# Patient Record
Sex: FEMALE | Race: WHITE | HISPANIC OR LATINO | Employment: FULL TIME | URBAN - METROPOLITAN AREA
[De-identification: names, ages, dates, MRNs, and addresses within clinical notes are randomized per-mention and may not be internally consistent; named-entity substitution may affect disease eponyms.]

---
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
## 2018-01-10 ENCOUNTER — OFFICE VISIT (OUTPATIENT)
Dept: FAMILY MEDICINE | Facility: CLINIC | Age: 18
End: 2018-01-10
Payer: COMMERCIAL

## 2018-01-10 VITALS
BODY MASS INDEX: 43.4 KG/M2 | DIASTOLIC BLOOD PRESSURE: 78 MMHG | HEIGHT: 69 IN | WEIGHT: 293 LBS | SYSTOLIC BLOOD PRESSURE: 128 MMHG | OXYGEN SATURATION: 96 % | HEART RATE: 99 BPM | TEMPERATURE: 98.2 F

## 2018-01-10 DIAGNOSIS — J06.9 UPPER RESPIRATORY TRACT INFECTION, UNSPECIFIED TYPE: Primary | ICD-10-CM

## 2018-01-10 LAB
DEPRECATED S PYO AG THROAT QL EIA: NORMAL
FLUAV+FLUBV AG SPEC QL: NEGATIVE
FLUAV+FLUBV AG SPEC QL: NEGATIVE
SPECIMEN SOURCE: NORMAL
SPECIMEN SOURCE: NORMAL

## 2018-01-10 PROCEDURE — 87880 STREP A ASSAY W/OPTIC: CPT | Performed by: NURSE PRACTITIONER

## 2018-01-10 PROCEDURE — 87804 INFLUENZA ASSAY W/OPTIC: CPT | Performed by: NURSE PRACTITIONER

## 2018-01-10 PROCEDURE — 87081 CULTURE SCREEN ONLY: CPT | Performed by: NURSE PRACTITIONER

## 2018-01-10 PROCEDURE — 99213 OFFICE O/P EST LOW 20 MIN: CPT | Performed by: NURSE PRACTITIONER

## 2018-01-10 NOTE — LETTER
January 10, 2018      Bev Araujo  [address]  [facility] [address]        To Whom It May Concern,        Patient was seen today in our clinic due to illness.  She has been diagnosed with a likely viral upper respiratory illness, negative for influenza or strep pharyngitis.   She is cleared to return to work when she is afebrile and symptom-free for 24 hours.           Sincerely,        JASEN Rao CNP

## 2018-01-10 NOTE — MR AVS SNAPSHOT
"              After Visit Summary   1/10/2018    Bev Araujo    MRN: [id number]           Patient Information     Date Of Birth          2000        Visit Information        Provider Department      1/10/2018 9:00 AM Erica Babb APRN CNP [facility]        Today's Diagnoses     Upper respiratory tract infection, unspecified type    -  1       Follow-ups after your visit        Who to contact     If you have questions or need follow up information about today's clinic visit or your schedule please contact [facility] directly at [phone].  Normal or non-critical lab and imaging results will be communicated to you by Game9zhart, letter or phone within 4 business days after the clinic has received the results. If you do not hear from us within 7 days, please contact the clinic through Game9zhart or phone. If you have a critical or abnormal lab result, we will notify you by phone as soon as possible.  Submit refill requests through [employer] or call your pharmacy and they will forward the refill request to us. Please allow 3 business days for your refill to be completed.          Additional Information About Your Visit        MyChart Information     [employer] lets you send messages to your doctor, view your test results, renew your prescriptions, schedule appointments and more. To sign up, go to [email or web address] . Click on \"Log in\" on the left side of the screen, which will take you to the Welcome page. Then click on \"Sign up Now\" on the right side of the page.     You will be asked to enter the access code listed below, as well as some personal information. Please follow the directions to create your username and password.     Your access code is: [email or web address]  Expires: 2018 10:21 AM     Your access code will  in 90 days. If you need help or a new code, please call your [facility] or [phone].        Care EveryWhere ID     This " "is your Care EveryWhere ID. This could be used by other organizations to access your [facility] medical records  [id number]        Your Vitals Were     Pulse Temperature Height Pulse Oximetry BMI (Body Mass Index)       99 98.2  F (36.8  C) (Oral) 5' 8.5\" (1.74 m) 96% 47.64 kg/m2        Blood Pressure from Last 3 Encounters:   01/10/18 128/78   11/06/17 (!) 158/93   11/06/17 129/85    Weight from Last 3 Encounters:   01/10/18 (!) 318 lb (144.2 kg) (>99 %)*   11/06/17 (!) 323 lb 3.2 oz (146.6 kg) (>99 %)*   11/06/17 (!) 323 lb 9.6 oz (146.8 kg) (>99 %)*     * Growth percentiles are based on [facility] 2-20 Years data.              We Performed the Following     Beta strep group A culture     Influenza A/B antigen     Rapid strep screen        Primary Care Provider Office Phone # Fax #    Linnea Kirby Madisyn, [phone]       [address]  [facility] [address]        Equal Access to Services     [facility] Hospital[facility] : Hadii aad ku hadasho Sojuanitaali, waaxda luqadaha, qaybta kaalmada adeegyada, costa pascal . So [facility] [phone].    ATENCIÓN: Si habla español, tiene a mcfarland disposición servicios gratuitos de asistencia lingüística. [facility] [phone].    We comply with applicable federal civil rights laws and [place] laws. We do not discriminate on the basis of race, color, national origin, age, disability, sex, sexual orientation, or gender identity.            Thank you!     Thank you for choosing [facility]  for your care. Our goal is always to provide you with excellent care. Hearing back from our patients is one way we can continue to improve our services. Please take a few minutes to complete the written survey that you may receive in the mail after your visit with us. Thank you!             Your Updated Medication List - Protect others around you: Learn how to safely use, store and throw away your medicines at www.disposemymeds.org.          This list is " accurate as of: 1/10/18  9:58 AM.  Always use your most recent med list.                   Brand Name Dispense Instructions for use Diagnosis    levonorgestrel-ethinyl estradiol 0.1-20 MG-MCG per tablet    AVIANE,ALESSE,LESSINA    84 tablet    Take 1 tablet by mouth daily    Irregular menstrual cycle       norethindrone 0.35 MG per tablet    MICRONOR    84 tablet    Take 1 tablet (0.35 mg) by mouth daily    General counseling for prescription of oral contraceptives

## 2018-01-10 NOTE — PROGRESS NOTES
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  Pt states she has had nausea and fatigue.    Unable to return to work unless cleared - requests strep and flu testing.      ROS  Negative for constitutional, eye, ear, nose, throat, skin, respiratory, cardiac, and gastrointestinal other than those outlined in the HPI.    PROBLEM LIST  Patient Active Problem List    Diagnosis Date Noted     Morbid obesity (H) 05/14/2016     Priority: Medium     Attention deficit disorder 06/30/2006     Priority: Medium     Overview:   Inattention dominates Concerta 54 mg strength  in 2008   (weekends -  varies as to if meds  given  or not )        MEDICATIONS  Current Outpatient Prescriptions   Medication Sig Dispense Refill     norethindrone (MICRONOR) 0.35 MG per tablet Take 1 tablet (0.35 mg) by mouth daily 84 tablet 3     levonorgestrel-ethinyl estradiol (AVIANE,ALESSE,LESSINA) 0.1-20 MG-MCG per tablet Take 1 tablet by mouth daily (Patient not taking: Reported on 1/10/2018) 84 tablet 3      ALLERGIES  No Known Allergies    Reviewed and updated as needed this visit by clinical staff  Tobacco  Allergies  Meds  Med Hx  Surg Hx  Fam Hx  Soc Hx        Reviewed and updated as needed this visit by Provider       OBJECTIVE:     /78  Pulse 99  Temp 98.2  F (36.8  C) (Oral)  Ht 5' 8.5\" (1.74 m)  Wt (!) 318 lb (144.2 kg)  SpO2 96%  BMI 47.64 kg/m2  95 %ile based on CDC 2-20 Years stature-for-age data using " vitals from 1/10/2018.  >99 %ile based on CDC 2-20 Years weight-for-age data using vitals from 1/10/2018.  >99 %ile based on CDC 2-20 Years BMI-for-age data using vitals from 1/10/2018.  Blood pressure percentiles are 93.5 % systolic and 88.2 % diastolic based on NHBPEP's 4th Report.   (This patient's height is above the 95th percentile. The blood pressure percentiles above assume this patient to be in the 95th percentile.)    GENERAL: Active, alert, in no acute distress.  SKIN: Clear. No significant rash, abnormal pigmentation or lesions  HEAD: Normocephalic.  EYES:  No discharge or erythema. Normal pupils and EOM.  EARS: Normal canals. Tympanic membranes are normal; gray and translucent.  NOSE: Normal without discharge.  MOUTH/THROAT: Clear. No oral lesions.+cobblestoning.  Mild post-nasal discharge.   NECK: Supple, no masses.  LYMPH NODES: No adenopathy  LUNGS: Clear. No rales, rhonchi, wheezing or retractions  HEART: Regular rhythm. Normal S1/S2. No murmurs.    DIAGNOSTICS: Rapid strep Ag:  negative  Influenza Ag:  A negative; B negative    ASSESSMENT/PLAN:   1. Upper respiratory tract infection, unspecified type  Increased fluid hydration  Acetaminophen/ibuprofen as needed for throat and headache pain  Nasal saline as needed for nasal congestion  Humidifier/vaporizer/moist steam suggested.        Recommend flonase and cetirizine once daily PRN for congestion.   Letter provided for work.     - Rapid strep screen  - Influenza A/B antigen  - Beta strep group A culture    FOLLOW UP: Return to clinic as needed for persistent/worsening symptoms, reviewed.       Erica Babb, APRN CNP

## 2018-01-11 LAB
BACTERIA SPEC CULT: NORMAL
SPECIMEN SOURCE: NORMAL

## 2018-02-23 ENCOUNTER — OFFICE VISIT (OUTPATIENT)
Dept: OPTOMETRY | Facility: CLINIC | Age: 18
End: 2018-02-23
Payer: COMMERCIAL

## 2018-02-23 ENCOUNTER — TELEPHONE (OUTPATIENT)
Dept: OPTOMETRY | Facility: CLINIC | Age: 18
End: 2018-02-23

## 2018-02-23 DIAGNOSIS — H52.223 REGULAR ASTIGMATISM OF BOTH EYES: ICD-10-CM

## 2018-02-23 DIAGNOSIS — H52.13 MYOPIA OF BOTH EYES: Primary | ICD-10-CM

## 2018-02-23 PROCEDURE — 92015 DETERMINE REFRACTIVE STATE: CPT | Performed by: OPTOMETRIST

## 2018-02-23 PROCEDURE — 92310 CONTACT LENS FITTING OU: CPT | Mod: GA | Performed by: OPTOMETRIST

## 2018-02-23 PROCEDURE — 92014 COMPRE OPH EXAM EST PT 1/>: CPT | Performed by: OPTOMETRIST

## 2018-02-23 ASSESSMENT — SLIT LAMP EXAM - LIDS
COMMENTS: NORMAL
COMMENTS: NORMAL

## 2018-02-23 ASSESSMENT — VISUAL ACUITY
METHOD: SNELLEN - LINEAR
OS_CC: 2/020
OD_CC: 20/20
CORRECTION_TYPE: CONTACTS
OS_CC+: -1
OD_CC: 20/20
OD_CC+: -1
OS_CC: 20/20

## 2018-02-23 ASSESSMENT — TONOMETRY
IOP_METHOD: TONOPEN
OS_IOP_MMHG: 19
OD_IOP_MMHG: 21

## 2018-02-23 ASSESSMENT — EXTERNAL EXAM - RIGHT EYE: OD_EXAM: NORMAL

## 2018-02-23 ASSESSMENT — CUP TO DISC RATIO
OS_RATIO: 0.3
OD_RATIO: 0.4

## 2018-02-23 ASSESSMENT — REFRACTION_CURRENTRX
OS_SPHERE: -3.75
OS_BASECURVE: 8.60
OD_SPHERE: -3.00
OS_DIAMETER: 14.2
OD_AXIS: 180
OD_BASECURVE: 8.70
OD_CYLINDER: -0.75
OD_BRAND: AIR OPTIX AQUA FOR ASTIGMATISM
OS_BRAND: AIR OPTIX AQUA
OD_DIAMETER: 14.5

## 2018-02-23 ASSESSMENT — EXTERNAL EXAM - LEFT EYE: OS_EXAM: NORMAL

## 2018-02-23 ASSESSMENT — REFRACTION_MANIFEST
OS_AXIS: 087
OS_CYLINDER: +0.75
OS_SPHERE: -4.50
OD_SPHERE: -4.25
OD_AXIS: 092
OD_CYLINDER: +1.25

## 2018-02-23 ASSESSMENT — REFRACTION_WEARINGRX
OD_SPHERE: -4.25
OS_SPHERE: -4.50
OS_AXIS: 089
OD_AXIS: 092
OS_CYLINDER: +0.75
OD_CYLINDER: +1.25

## 2018-02-23 ASSESSMENT — CONF VISUAL FIELD
OD_NORMAL: 1
OS_NORMAL: 1

## 2018-02-23 NOTE — TELEPHONE ENCOUNTER
Brand Base Curve Diameter Sphere Cylinder Axis   Right Air Optix Aqua for Astigmatism 8.70 14.5 -3.00 -1.25 180   Left Air Optix Aqua 8.60 14.2 -3.75 -0.75 180     Order trials of prescription 2 for  at [facility].  Trial contact lenses will be ordered for .  Ok to order preferred lens.   Kelin Levin ORDERED CONTACTS 2/23/2018.

## 2018-02-23 NOTE — MR AVS SNAPSHOT
After Visit Summary   2/23/2018    Bev Araujo    MRN: [id number]           Patient Information     Date Of Birth          2000        Visit Information        Provider Department      2/23/2018 8:40 AM Willie Chang OD [facility]        Today's Diagnoses     Myopia of both eyes    -  1    Regular astigmatism of both eyes          Care Instructions    Eyeglass prescription given.    Trial contact lenses will be ordered for .  Ok to order preferred lens.    Return in 1 year for a complete eye exam or sooner if needed.    Willie Chang OD      The affects of the dilating drops last for 4- 6 hours.  You will be more sensitive to light and vision will be blurry up close.  Mydriatic sunglasses were given if needed.      Optometry Providers       Clinic Locations                                 Telephone Number   Dr. Estephania Whiting   [facility] [phone]     [facility] Hours:                [facility] Hours:       [facility] Hours:   [address]   [address]     [address] [place] [address]   [place] [address]    [place] [address]  Phone: [phone]                    Phone: [phone]     Phone: [phone]                      Monday 8:00-7:00                          Monday 8:00-7:00                          Monday 8:00-7:00              Tuesday 8:00-6:00                          Tuesday 8:00-7:00                          Tuesday 8:00-7:00              Wednesday 8:00-6:00                  Wednesday 8:00-7:00                   Wednesday 8:00-7:00      Thursday 8:00-6:00                        Thursday 8:00-7:00                         Thursday 8:00-7:00            Friday 8:00-5:00                              Friday 8:00-5:00                              Friday  8:00-5:00    [facility] Hours:   [address] [place] [address]  [phone]    Monday 8:00-7:00  Tuesday 8:00-7:00  Wednesday 8:00-7:00  Thursday 8:00-7:00  Friday 8:00-5:00  Please log on to [email or web address] to order your contact lenses.  The link is found on the Eye Care and Vision Services page.  As always, Thank you for trusting us with your health care needs!              Follow-ups after your visit        Follow-up notes from your care team     Return in about 1 year (around 2019) for Annual Visit.      Who to contact     If you have questions or need follow up information about today's clinic visit or your schedule please contact [facility] directly at [phone].  Normal or non-critical lab and imaging results will be communicated to you by MyChart, letter or phone within 4 business days after the clinic has received the results. If you do not hear from us within 7 days, please contact the clinic through MYTRNDhart or phone. If you have a critical or abnormal lab result, we will notify you by phone as soon as possible.  Submit refill requests through [employer] or call your pharmacy and they will forward the refill request to us. Please allow 3 business days for your refill to be completed.          Additional Information About Your Visit        MyChart Information     Played is an electronic gateway that provides easy, online access to your medical records. With Played, you can request a clinic appointment, read your test results, renew a prescription or communicate with your care team.     To sign up for Played visit the website at [email or web address]   You will be asked to enter the access code listed below, as well as some personal information. Please follow the directions to create your username and password.     Your access code is: [email or web address]  Expires: 2018  9:03 AM     Your access code will  in 90 days. If you need help or a new code, please  contact your [facility] or call [phone] for assistance.        Care EveryWhere ID     This is your Care EveryWhere ID. This could be used by other organizations to access your [place] medical records  [id number]         Blood Pressure from Last 3 Encounters:   01/10/18 128/78   11/06/17 (!) 158/93   11/06/17 129/85    Weight from Last 3 Encounters:   01/10/18 (!) 144.2 kg (318 lb) (>99 %)*   11/06/17 (!) 146.6 kg (323 lb 3.2 oz) (>99 %)*   11/06/17 (!) 146.8 kg (323 lb 9.6 oz) (>99 %)*     * Growth percentiles are based on [facility] 2-20 Years data.              We Performed the Following     CONTACT LENS FITTING,BILAT     EYE EXAM (SIMPLE-NONBILLABLE)     REFRACTION          Today's Medication Changes          These changes are accurate as of 2/23/18  9:03 AM.  If you have any questions, ask your nurse or doctor.               Stop taking these medicines if you haven't already. Please contact your care team if you have questions.     levonorgestrel-ethinyl estradiol 0.1-20 MG-MCG per tablet   Commonly known as:  RASHMI FERRER LESSINA   Stopped by:  Willie Chang, STEPHANIA                    Primary Care Provider Office Phone # Fax #    Linnea Mars [phone]       [address]  [facility] [address]        Equal Access to Services     NAHOMY LÓPEZ : Hadii elidia ku hadasho Soomaali, waaxda luqadaha, qaybta kaalmada costa hood . So [facility] [place] [phone].    ATENCIÓN: Si habla español, tiene a mcfarland disposición servicios gratuitos de asistencia lingüística. Llame al [phone].    We comply with applicable federal civil rights laws and [place] laws. We do not discriminate on the basis of race, color, national origin, age, disability, sex, sexual orientation, or gender identity.            Thank you!     Thank you for choosing [facility]  for your care. Our goal is always to provide you with excellent care.  Hearing back from our patients is one way we can continue to improve our services. Please take a few minutes to complete the written survey that you may receive in the mail after your visit with us. Thank you!             Your Updated Medication List - Protect others around you: Learn how to safely use, store and throw away your medicines at www.disposemymeds.org.          This list is accurate as of 2/23/18  9:03 AM.  Always use your most recent med list.                   Brand Name Dispense Instructions for use Diagnosis    norethindrone 0.35 MG per tablet    MICRONOR    84 tablet    Take 1 tablet (0.35 mg) by mouth daily    General counseling for prescription of oral contraceptives

## 2018-02-23 NOTE — LETTER
2/23/2018         RE: Bev Araujo  [address]  [facility] [address]        Dear Colleague,    Thank you for referring your patient, Bev Araujo, to the [facility]. Please see a copy of my visit note below.    Chief Complaint   Patient presents with     COMPREHENSIVE EYE EXAM     more contacts fit fee ok        Previous contact lens wearer? Yes: air oprtix  Comfort of contact lenses :good  Satisfied with current lenses: Yes        Last Eye Exam: 2-  Dilated Previously: Yes    What are you currently using to see?  contacts    Distance Vision Acuity: Noticed gradual change in both eyes    Near Vision Acuity: Satisfied with vision while reading  contacts    Eye Comfort: good  Do you use eye drops? : Yes: blink for contacts  Occupation or Hobbies: nursing asst      Kelin Levin Optometric Assistant, A.B.O.C.     Medical, surgical and family histories reviewed and updated 2/23/2018.       OBJECTIVE: See Ophthalmology exam    ASSESSMENT:    ICD-10-CM    1. Myopia of both eyes H52.13 EYE EXAM (SIMPLE-NONBILLABLE)     CONTACT LENS FITTING,BILAT     REFRACTION   2. Regular astigmatism of both eyes H52.223 EYE EXAM (SIMPLE-NONBILLABLE)     CONTACT LENS FITTING,BILAT     REFRACTION      PLAN:     Patient Instructions   Eyeglass prescription given.    Trial contact lenses will be ordered for .  Ok to order preferred lens.    Return in 1 year for a complete eye exam or sooner if needed.    Willie Chang, STEPHANIA                           Again, thank you for allowing me to participate in the care of your patient.        Sincerely,        Willie Chang, OD

## 2018-02-23 NOTE — PROGRESS NOTES
Chief Complaint   Patient presents with     COMPREHENSIVE EYE EXAM     more contacts fit fee ok        Previous contact lens wearer? Yes: air oprtix  Comfort of contact lenses :good  Satisfied with current lenses: Yes        Last Eye Exam: 2-  Dilated Previously: Yes    What are you currently using to see?  contacts    Distance Vision Acuity: Noticed gradual change in both eyes    Near Vision Acuity: Satisfied with vision while reading  contacts    Eye Comfort: good  Do you use eye drops? : Yes: blink for contacts  Occupation or Hobbies: nursing asst      Kelin Levin Optometric Assistant, A.B.O.C.     Medical, surgical and family histories reviewed and updated 2/23/2018.       OBJECTIVE: See Ophthalmology exam    ASSESSMENT:    ICD-10-CM    1. Myopia of both eyes H52.13 EYE EXAM (SIMPLE-NONBILLABLE)     CONTACT LENS FITTING,BILAT     REFRACTION   2. Regular astigmatism of both eyes H52.223 EYE EXAM (SIMPLE-NONBILLABLE)     CONTACT LENS FITTING,BILAT     REFRACTION      PLAN:     Patient Instructions   Eyeglass prescription given.    Trial contact lenses will be ordered for .  Ok to order preferred lens.    Return in 1 year for a complete eye exam or sooner if needed.    Willie Chang, OD

## 2018-02-23 NOTE — PATIENT INSTRUCTIONS
Eyeglass prescription given.    Trial contact lenses will be ordered for .  Ok to order preferred lens.    Return in 1 year for a complete eye exam or sooner if needed.    Willie Chang, STEPHANIA      The affects of the dilating drops last for 4- 6 hours.  You will be more sensitive to light and vision will be blurry up close.  Mydriatic sunglasses were given if needed.      Optometry Providers       Clinic Locations                                 Telephone Number   Dr. Estephania Fuentes [facility]   [facility] [phone]     [facility] Hours:                [place] [facility] Hours:       [facility] Hours:   [address]   [address]      [address] [place] [address]   [place] [address]    [place] [address]  Phone: [phone]                    Phone: [phone]     Phone: [phone]                      Monday 8:00-7:00                          Monday 8:00-7:00                          Monday 8:00-7:00              Tuesday 8:00-6:00                          Tuesday 8:00-7:00                          Tuesday 8:00-7:00              Wednesday 8:00-6:00                  Wednesday 8:00-7:00                   Wednesday 8:00-7:00      Thursday 8:00-6:00                        Thursday 8:00-7:00                         Thursday 8:00-7:00            Friday 8:00-5:00                              Friday 8:00-5:00                              Friday 8:00-5:00    [facility] Hours:   [address] [place] [address]  [phone]    Monday 8:00-7:00  Tuesday 8:00-7:00  Wednesday 8:00-7:00  Thursday 8:00-7:00  Friday 8:00-5:00  Please log on to [email or web address] to order your contact lenses.  The link is found on the Eye Care and Vision Services page.  As always, Thank you for trusting us with your health care needs!

## 2018-04-23 ENCOUNTER — THERAPY VISIT (OUTPATIENT)
Dept: PHYSICAL THERAPY | Facility: CLINIC | Age: 18
End: 2018-04-23
Payer: OTHER MISCELLANEOUS

## 2018-04-23 ENCOUNTER — TELEPHONE (OUTPATIENT)
Dept: PEDIATRICS | Facility: CLINIC | Age: 18
End: 2018-04-23

## 2018-04-23 ENCOUNTER — OFFICE VISIT (OUTPATIENT)
Dept: PEDIATRICS | Facility: CLINIC | Age: 18
End: 2018-04-23
Payer: OTHER MISCELLANEOUS

## 2018-04-23 ENCOUNTER — RADIANT APPOINTMENT (OUTPATIENT)
Dept: GENERAL RADIOLOGY | Facility: CLINIC | Age: 18
End: 2018-04-23
Attending: FAMILY MEDICINE
Payer: OTHER MISCELLANEOUS

## 2018-04-23 VITALS
OXYGEN SATURATION: 99 % | BODY MASS INDEX: 48.45 KG/M2 | SYSTOLIC BLOOD PRESSURE: 120 MMHG | WEIGHT: 293 LBS | DIASTOLIC BLOOD PRESSURE: 78 MMHG | HEART RATE: 82 BPM | TEMPERATURE: 98.7 F

## 2018-04-23 DIAGNOSIS — M79.605 PAIN OF BACK AND LEFT LOWER EXTREMITY: ICD-10-CM

## 2018-04-23 DIAGNOSIS — M54.9 PAIN OF BACK AND LEFT LOWER EXTREMITY: ICD-10-CM

## 2018-04-23 DIAGNOSIS — M54.42 LEFT-SIDED LOW BACK PAIN WITH LEFT-SIDED SCIATICA, UNSPECIFIED CHRONICITY: Primary | ICD-10-CM

## 2018-04-23 DIAGNOSIS — M79.605 PAIN OF BACK AND LEFT LOWER EXTREMITY: Primary | ICD-10-CM

## 2018-04-23 DIAGNOSIS — M54.9 PAIN OF BACK AND LEFT LOWER EXTREMITY: Primary | ICD-10-CM

## 2018-04-23 LAB — RADIOLOGIST FLAGS: NORMAL

## 2018-04-23 PROCEDURE — 97530 THERAPEUTIC ACTIVITIES: CPT | Mod: GP | Performed by: PHYSICAL THERAPIST

## 2018-04-23 PROCEDURE — 99214 OFFICE O/P EST MOD 30 MIN: CPT | Performed by: FAMILY MEDICINE

## 2018-04-23 PROCEDURE — 97110 THERAPEUTIC EXERCISES: CPT | Mod: GP | Performed by: PHYSICAL THERAPIST

## 2018-04-23 PROCEDURE — 97161 PT EVAL LOW COMPLEX 20 MIN: CPT | Mod: GP | Performed by: PHYSICAL THERAPIST

## 2018-04-23 PROCEDURE — 72100 X-RAY EXAM L-S SPINE 2/3 VWS: CPT | Performed by: RADIOLOGY

## 2018-04-23 RX ORDER — CYCLOBENZAPRINE HCL 10 MG
10 TABLET ORAL
Qty: 14 TABLET | Refills: 1 | Status: SHIPPED | OUTPATIENT
Start: 2018-04-23 | End: 2018-10-03

## 2018-04-23 ASSESSMENT — PAIN SCALES - GENERAL: PAINLEVEL: MODERATE PAIN (4)

## 2018-04-23 NOTE — PROGRESS NOTES
"[facility] Initial Evaluation -- Lumbar    Date: April 23, 2018  Bev Araujo is a 18 year old female with a (L) LB with radiculopathy condition.   Referral: Dr. Rigoberto MD  Work mechanical stresses:  Standing, bending, lifting   Employment status:  Nursing assistance, student   Leisure mechanical stresses: hanging out with friends   Functional disability score (TRENT/STarT Back):  20%, LOW  VAS score (0-10): 5/10; 7/10 at worst   Patient goals/expectations:  \"dec low back pain and leg symptoms\"    HISTORY:    Present symptoms: L low back/glute pain that radiates down L LE  Pain quality (sharp/shooting/stabbing/aching/burning/cramping):  Cramping/stabbing  Paresthesia (yes/no):  Yes     Present since (onset date): 3/23/18 - fall on ice   Symptoms (improving/unchanging/worsening):  Worsening     Symptoms commenced as a result of: fall on ice   Condition occurred in the following environment:  Outside     Symptoms at onset (back/thigh/leg): back  Constant symptoms (back/thigh/leg): back  Intermittent symptoms (back/thigh/leg): knee,thigh,calf     Symptoms are made worse with the following: Sometimes Sitting when WB on left, Always Prolonged Standing, Time of day - Always as the day progresses and Always PM and Other - twisting motions    Symptoms are made better with the following: Sometimes Lying and having knees closer to chest     Disturbed sleep (yes/no):  no Sleeping postures (prone/sup/side R/L): R side     Previous episodes (0/1-5/6-10/11+): 0 Year of first episode: 2018    Previous history: none  Previous treatments: none      Specific Questions:  Cough/Sneeze/Strain (pos/neg): neg  Bowel/Bladder (normal/abnormal): neg  Gait (normal/abnormal): normal  Medications (nil/NSAIDS/analg/steroids/anticoag/other):  NSAIDS - not helpful  Medical allergies:  N/A  General health (excellent/good/fair/poor):  fair  Pertinent medical history:  Overweight  Imaging (None/Xray/MRI/Other):  " Xray just taken today  Recent or major surgery (yes/no):  no  Night pain (yes/no): no  Accidents (yes/no): no  Unexplained weight loss (yes/no): no  Barriers at home: none  Other red flags: none     EXAMINATION    Posture:   Sitting (good/fair/poor): fair, prefers sitting on L glut  Standing (good/fair/poor):fair, avoids WB on L LE   Lordosis (red/acc/normal): normal  Correction of posture (better/worse/no effect): worse for the back, no change L LE    Lateral Shift (right/left/nil): nil  Relevant (yes/no):  no  Other Observations: see above     Neurological:    Motor deficit:  4/5 L hip flexor, 4+/5 DF and great toe extension otherwise Strong and painfree MMT  Reflexes:  Brisk and symmetrical  Sensory deficit:  NT  Dural signs:  (-) seated SLR B    Movement Loss:   Corby Mod Min Nil Pain   Flexion    x Finger tips to toes NE   Extension  x x  Dev to the R with inc LBP   Side Gliding R  x   Mod loss with inc L LBP   Side Gliding L  x   Nil loss NE on symptoms     Test Movements:   During: produces, abolishes, increases, decreases, no effect, centralizing, peripheralizing   After: better, worse, no better, no worse, no effect, centralized, peripheralized      Pretest symptoms lyin/10 L LB, glute    Symptoms During Symptoms After ROM increased ROM decreased No Effect   JILLIAN        Rep JILLIAN        EIL No Effect    No Effect         Rep EIL No Effect    abolished    X - produced ERP w EIS         Static Tests:  Sitting slouched:  NT  Sitting erect:  Inc low back pain  Standing slouched NT  Standing erect:  NT  Lying prone in extension:  3' no effect - inc PDM with EIS, no deviation.  Full SGIS improved strength for hip flexion 4+/5 and DF 5/5.      Long sitting:  NT    Provisional Classification:  Derangement - Asymmetrical, unilateral, symptoms below knee    Principle of Management:  Education:  Centralization; lifting mechanics, neutral spine, assessing EIS symptoms  Equipment provided:  Lumbar roll; next time    Mechanical therapy (Y/N):  Yes     Extension principle:  Extension progression: RANDI x3 min as needed, Press ups, supported EIS (at work) 6-8x/day 10 reps / Lock blow Sag inc symptoms down L LE, instructed to not do at this time      ASSESSMENT/PLAN:    Patient is a 18 year old female with lumbar complaints.    Patient has the following significant findings with corresponding treatment plan.                Diagnosis 1:  L LB pain with radiculopathy    Pain -  manual therapy, self management, education, directional preference exercise and home program  Decreased ROM/flexibility - manual therapy, therapeutic exercise, therapeutic activity and home program  Decreased joint mobility - manual therapy, therapeutic exercise and therapeutic activity  Decreased strength - therapeutic exercise, therapeutic activities and home program  Decreased function - therapeutic activities and home program  Impaired posture - neuro re-education, therapeutic activities and home program    Therapy Evaluation Codes:   1) History comprised of:   Personal factors that impact the plan of care:      None.    Comorbidity factors that impact the plan of care are:      Overweight.     Medications impacting care: None.  2) Examination of Body Systems comprised of:   Body structures and functions that impact the plan of care:      Lumbar spine.   Activity limitations that impact the plan of care are:      Sitting, Standing and Working.  3) Clinical presentation characteristics are:   Stable/Uncomplicated.  4) Decision-Making    Low complexity using standardized patient assessment instrument and/or measureable assessment of functional outcome.  Cumulative Therapy Evaluation is: Low complexity.    Previous and current functional limitations:  (See Goal Flow Sheet for this information)    Short term and Long term goals: (See Goal Flow Sheet for this information)     Communication ability:  Patient appears to be able to clearly communicate and  understand verbal and written communication and follow directions correctly.  Treatment Explanation - The following has been discussed with the patient:   RX ordered/plan of care  Anticipated outcomes  Possible risks and side effects  This patient would benefit from PT intervention to resume normal activities.   Rehab potential is good.    Frequency:  1 X week, once daily  Duration:  for 8 weeks  Discharge Plan:  Achieve all LTG.  Independent in home treatment program.  Reach maximal therapeutic benefit.    Please refer to the daily flowsheet for treatment today, total treatment time and time spent performing 1:1 timed codes.

## 2018-04-23 NOTE — MR AVS SNAPSHOT
After Visit Summary   4/23/2018    Bev Araujo    MRN: [id number]           Patient Information     Date Of Birth          2000        Visit Information        Provider Department      4/23/2018 9:30 AM Jose Franklin MD [facility]        Today's Diagnoses     Pain of back and left lower extremity    -  1      Care Instructions      Back Sprain or Strain    Injury to the muscles (strain) or ligaments (sprain) around the spine can be troubling. Injury may occur after a sudden forceful twisting or bending force such as in a car accident, after a simple awkward movement, or after lifting something heavy with poor body positioning. In any case, muscle spasm is often present and adds to the pain.  Thankfully, most people feel better in 1 to 2 weeks, and most of the rest in 1 to 2 months. Most people can remain active. Unless you had a forceful or traumatic physical injury such as a car accident or fall, X-rays may not be ordered for the first evaluation of a back sprain or strain. If pain continues and does not respond to medical treatment, your healthcare provider may then order X-rays and other tests.  Home care  The following guidelines will help you care for your injury at home:    When in bed, try to find a comfortable position. A firm mattress is best. Try lying flat on your back with pillows under your knees. You can also try lying on your side with your knees bent up toward your chest and a pillow between your knees.    Don't sit for long periods. Try not to take long car rides or take other trips that have you sitting for a long time. This puts more stress on the lower back than standing or walking.    During the first 24 to 72 hours after an injury or flare-up, apply an ice pack to the painful area for 20 minutes. Then remove it for 20 minutes. Do this for 60 to 90 minutes, or several times a day. This will reduce swelling and pain. Be sure to wrap  the ice pack in a thin towel or plastic to protect your skin.    You can start with ice, then switch to heat. Heat from a hot shower, hot bath, or heating pad reduces pain and works well for muscle spasms. Put heat on the painful area for 20 minutes, then remove for 20 minutes. Do this for 60 to 90 minutes, or several times a day. Do not use a heating pad while sleeping. It can burn the skin.    You can alternate the ice and heat. Talk with your healthcare provider to find out the best treatment or therapy for your back pain.    Therapeutic massage will help relax the back muscles without stretching them.    Be aware of safe lifting methods. Do not lift anything over 15 pounds until all of the pain is gone.  Medicines  Talk to your healthcare provider before using medicines, especially if you have other health problems or are taking other medicines.    You may use acetaminophen or ibuprofen to control pain, unless another pain medicine was prescribed. If you have chronic conditions like diabetes, liver or kidney disease, stomach ulcers, or gastrointestinal bleeding, or are taking blood-thinner medicines, talk with your doctor before taking any medicines.    Be careful if you are given prescription medicines, narcotics, or medicine for muscle spasm. They can cause drowsiness, and affect your coordination, reflexes, and judgment. Do not drive or operate heavy machinery when taking these types of medicines. Only take pain medicine as prescribed by your healthcare provider.  Follow-up care  Follow up with your healthcare provider, or as advised. You may need physical therapy or more tests if your symptoms get worse.  If you had X-rays your healthcare provider may be checking for any broken bones, breaks, or fractures. Bruises and sprains can sometimes hurt as much as a fracture. These injuries can take time to heal completely. If your symptoms don t improve or they get worse, talk with your healthcare provider. You may  "need a repeat X-ray or other tests.  Call 911  Call 911 if any of the following occur:    Trouble breathing    Confused    Very drowsy or trouble awakening    Fainting or loss of consciousness    Rapid or very slow heart rate    Loss of bowel or bladder control  When to seek medical advice  Call your healthcare provider right away if any of the following occur:    Pain gets worse or spreads to your arms or legs    Weakness or numbness in one or both arms or legs    Numbness in the groin or genital area  Date Last Reviewed: 6/1/2016 2000-2017 [employer]. [address] [place] [address]. All rights reserved. This information is not intended as a substitute for professional medical care. Always follow your healthcare professional's instructions.                Follow-ups after your visit        Additional Services     PHYSICAL THERAPY REFERRAL       *This therapy referral will be filtered to a centralized scheduling office at [facility] and the patient will receive a call to schedule an appointment at a [facility] location most convenient for them. *     [facility] provides Physical Therapy evaluation and treatment and many specialty services across the [facility] system.  If requesting a specialty program, please choose from the list below.    If you have not heard from the scheduling office within 2 business days, please call [phone] for all locations, with the exception of [place], please call [phone] and [facility], please call [phone]  Treatment: Evaluation & Treatment    Please be aware that coverage of these services is subject to the terms and limitations of your health insurance plan.  Call member services at your health plan with any benefit or coverage questions.      **Note to Provider:  If you are referring outside of [facility] for the therapy appointment, please list the name of the location in the \"special instructions\" " above, print the referral and give to the patient to schedule the appointment.                  Future tests that were ordered for you today     Open Future Orders        Priority Expected Expires Ordered    XR Lumbar Spine 2/3 Views Routine 2018            Who to contact     If you have questions or need follow up information about today's clinic visit or your schedule please contact [facility] directly at [phone].  Normal or non-critical lab and imaging results will be communicated to you by [employer], letter or phone within 4 business days after the clinic has received the results. If you do not hear from us within 7 days, please contact the clinic through [employer] or phone. If you have a critical or abnormal lab result, we will notify you by phone as soon as possible.  Submit refill requests through [employer] or call your pharmacy and they will forward the refill request to us. Please allow 3 business days for your refill to be completed.          Additional Information About Your Visit        [employer]br Information     [employer] is an electronic gateway that provides easy, online access to your medical records. With [employer]br, you can request a clinic appointment, read your test results, renew a prescription or communicate with your care team.     To sign up for [employer] visit the website at [email or web address]   You will be asked to enter the access code listed below, as well as some personal information. Please follow the directions to create your username and password.     Your access code is: [email or web address]  Expires: 2018 10:03 AM     Your access code will  in 90 days. If you need help or a new code, please contact your [facility] or call [phone] for assistance.        Care EveryWhere ID     This is your Care EveryWhere ID. This could be used by other organizations to access your [facility] medical records  [id number]         Your Vitals Were     Pulse Temperature Pulse Oximetry BMI (Body Mass Index)          82 98.7  F (37.1  C) (Temporal) 99% 48.45 kg/m2         Blood Pressure from Last 3 Encounters:   04/23/18 120/78   01/10/18 128/78   11/06/17 (!) 158/93    Weight from Last 3 Encounters:   04/23/18 323 lb 6.4 oz (146.7 kg)   01/10/18 (!) 318 lb (144.2 kg) (>99 %)*   11/06/17 (!) 323 lb 3.2 oz (146.6 kg) (>99 %)*     * Growth percentiles are based on [facility] 2-20 Years data.              We Performed the Following     PHYSICAL THERAPY REFERRAL          Today's Medication Changes          These changes are accurate as of 4/23/18 10:18 AM.  If you have any questions, ask your nurse or doctor.               Start taking these medicines.        Dose/Directions    cyclobenzaprine 10 MG tablet   Commonly known as:  FLEXERIL   Used for:  Pain of back and left lower extremity   Started by:  Jose Franklin MD        Dose:  10 mg   Take 1 tablet (10 mg) by mouth nightly as needed for muscle spasms   Quantity:  14 tablet   Refills:  1            Where to get your medicines      These medications were sent to [facility] - [place], [place] - [address], [address]  [address], [address], [facility] [address]     Phone:  [phone]     cyclobenzaprine 10 MG tablet                Primary Care Provider Office Phone # Fax #    Linnea Kirby MadisynDO [phone]       [address]  [facility] [address]        Equal Access to Services     [facility]: Hadbebe calderon Sotung, waaxda luqadaha, qaybta kaalmacosta chawla. So [facility] [phone].    ATENCIÓN: Si habla español, tiene a mcfarland disposición servicios gratuitos de asistencia lingüística. Hawk al [phone].    We comply with applicable federal civil rights laws and [place] laws. We do not discriminate on the basis of race, color, national origin, age, disability, sex, sexual  orientation, or gender identity.            Thank you!     Thank you for choosing [facility]  for your care. Our goal is always to provide you with excellent care. Hearing back from our patients is one way we can continue to improve our services. Please take a few minutes to complete the written survey that you may receive in the mail after your visit with us. Thank you!             Your Updated Medication List - Protect others around you: Learn how to safely use, store and throw away your medicines at www.disposemymeds.org.          This list is accurate as of 4/23/18 10:18 AM.  Always use your most recent med list.                   Brand Name Dispense Instructions for use Diagnosis    cyclobenzaprine 10 MG tablet    FLEXERIL    14 tablet    Take 1 tablet (10 mg) by mouth nightly as needed for muscle spasms    Pain of back and left lower extremity       norethindrone 0.35 MG per tablet    MICRONOR    84 tablet    Take 1 tablet (0.35 mg) by mouth daily    General counseling for prescription of oral contraceptives

## 2018-04-23 NOTE — PROGRESS NOTES
SUBJECTIVE:   Bev Araujo is a 18 year old female who presents to clinic today for the following health issues:      Joint Pain    Onset: One month ago with onset when returning up from squatting, reinjury about 1 week laer with similar mechanism. ad then a fall in her back yard.    Description:   Location: left hip, radiating to the left leg  Character: Cramping, and sore    Intensity: moderate with episodes of severe pain intrermittently    Progression of Symptoms: worse    Accompanying Signs & Symptoms:  Other symptoms: radiation of pain from left hip to the left leg into her upper mid calf    History:   Previous similar pain: no       Precipitating factors:   Trauma or overuse: as noted above , but pt states doing a lot of walking at her job    Alleviating factors:  Improved by: ALEVE, ICY HOT, AND NOT STRETCHING IT TOO MUCH     Therapies Tried and outcome: ALEVE, ICY HOT, AND NOT STRETCHING IT TOO MUCH- minimal benefit            Problem list and histories reviewed & adjusted, as indicated.  Additional history: as documented    Patient Active Problem List   Diagnosis     Morbid obesity (H)     Attention deficit disorder     No past surgical history on file.    Social History   Substance Use Topics     Smoking status: Passive Smoke Exposure - Never Smoker     Smokeless tobacco: Never Used      Comment: Dad smokes outside     Alcohol use No     Family History   Problem Relation Age of Onset     Hypertension Mother      DIABETES Maternal Grandfather      DIABETES Paternal Grandmother      Hypertension Paternal Grandmother      DIABETES Brother      Other Cancer Maternal Grandmother      ovarian     CANCER Maternal Grandmother          Current Outpatient Prescriptions   Medication Sig Dispense Refill     norethindrone (MICRONOR) 0.35 MG per tablet Take 1 tablet (0.35 mg) by mouth daily 84 tablet 3     No Known Allergies  BP Readings from Last 3 Encounters:   04/23/18 120/78   01/10/18 128/78    11/06/17 (!) 158/93    Wt Readings from Last 3 Encounters:   04/23/18 323 lb 6.4 oz (146.7 kg)   01/10/18 (!) 318 lb (144.2 kg) (>99 %)*   11/06/17 (!) 323 lb 3.2 oz (146.6 kg) (>99 %)*     * Growth percentiles are based on [facility] 2-20 Years data.                    Reviewed and updated as needed this visit by clinical staff       Reviewed and updated as needed this visit by Provider         ROS:  Except for her presenting complalints and her problem list her ROS is neg for 10 systems     OBJECTIVE:     /78 (BP Location: Right arm, Patient Position: Sitting, Cuff Size: Adult Large)  Pulse 82  Temp 98.7  F (37.1  C) (Temporal)  Wt 323 lb 6.4 oz (146.7 kg)  SpO2 99%  BMI 48.45 kg/m2  Body mass index is 48.45 kg/(m^2).  Patient appears to be in mild to moderate pain, antalgic gait not noted. Lumbosacral spine area reveals  local tenderness over the Lt   SI joint and in her lower Paraspinous area on the leftor mass.  Painful and reduced LS ROM noted. Straight leg raise is negative at 90 degrees on bilaterally with pt seated DTR's, motor strength and sensation normalt.  Peripheral pulses are palpable. X-Ray: ordered, but results not yet available.    ASSESSMENT:   lumbar strain    PLAN:  For acute pain, rest, intermittent application of heat (do not sleep on heating pad), analgesics and muscle relaxants are recommended. Discussed longer term treatment plan of prn NSAID's and discussed a home back care exercise program with flexion exercise routine. Proper lifting with avoidance of heavy lifting discussed. Consider Physical Therapy and XRay studies if not improving. Call or return to clinic prn if these symptoms worsen or fail to improve as anticipated.    Diagnostic Test Results:  Xray - pending    ASSESSMENT/PLAN:         ICD-10-CM    1. Pain of back and left lower extremity M54.9 XR Lumbar Spine 2/3 Views    M79.605 PHYSICAL THERAPY REFERRAL     cyclobenzaprine (FLEXERIL) 10 MG tablet       See Patient  Instructions, letter to recommend work limitations, AVS given to the patient.     Jose Franklin MD  [facility]

## 2018-04-23 NOTE — PATIENT INSTRUCTIONS
Back Sprain or Strain    Injury to the muscles (strain) or ligaments (sprain) around the spine can be troubling. Injury may occur after a sudden forceful twisting or bending force such as in a car accident, after a simple awkward movement, or after lifting something heavy with poor body positioning. In any case, muscle spasm is often present and adds to the pain.  Thankfully, most people feel better in 1 to 2 weeks, and most of the rest in 1 to 2 months. Most people can remain active. Unless you had a forceful or traumatic physical injury such as a car accident or fall, X-rays may not be ordered for the first evaluation of a back sprain or strain. If pain continues and does not respond to medical treatment, your healthcare provider may then order X-rays and other tests.  Home care  The following guidelines will help you care for your injury at home:    When in bed, try to find a comfortable position. A firm mattress is best. Try lying flat on your back with pillows under your knees. You can also try lying on your side with your knees bent up toward your chest and a pillow between your knees.    Don't sit for long periods. Try not to take long car rides or take other trips that have you sitting for a long time. This puts more stress on the lower back than standing or walking.    During the first 24 to 72 hours after an injury or flare-up, apply an ice pack to the painful area for 20 minutes. Then remove it for 20 minutes. Do this for 60 to 90 minutes, or several times a day. This will reduce swelling and pain. Be sure to wrap the ice pack in a thin towel or plastic to protect your skin.    You can start with ice, then switch to heat. Heat from a hot shower, hot bath, or heating pad reduces pain and works well for muscle spasms. Put heat on the painful area for 20 minutes, then remove for 20 minutes. Do this for 60 to 90 minutes, or several times a day. Do not use a heating pad while sleeping. It can burn the  skin.    You can alternate the ice and heat. Talk with your healthcare provider to find out the best treatment or therapy for your back pain.    Therapeutic massage will help relax the back muscles without stretching them.    Be aware of safe lifting methods. Do not lift anything over 15 pounds until all of the pain is gone.  Medicines  Talk to your healthcare provider before using medicines, especially if you have other health problems or are taking other medicines.    You may use acetaminophen or ibuprofen to control pain, unless another pain medicine was prescribed. If you have chronic conditions like diabetes, liver or kidney disease, stomach ulcers, or gastrointestinal bleeding, or are taking blood-thinner medicines, talk with your doctor before taking any medicines.    Be careful if you are given prescription medicines, narcotics, or medicine for muscle spasm. They can cause drowsiness, and affect your coordination, reflexes, and judgment. Do not drive or operate heavy machinery when taking these types of medicines. Only take pain medicine as prescribed by your healthcare provider.  Follow-up care  Follow up with your healthcare provider, or as advised. You may need physical therapy or more tests if your symptoms get worse.  If you had X-rays your healthcare provider may be checking for any broken bones, breaks, or fractures. Bruises and sprains can sometimes hurt as much as a fracture. These injuries can take time to heal completely. If your symptoms don t improve or they get worse, talk with your healthcare provider. You may need a repeat X-ray or other tests.  Call 911  Call 911 if any of the following occur:    Trouble breathing    Confused    Very drowsy or trouble awakening    Fainting or loss of consciousness    Rapid or very slow heart rate    Loss of bowel or bladder control  When to seek medical advice  Call your healthcare provider right away if any of the following occur:    Pain gets worse or  spreads to your arms or legs    Weakness or numbness in one or both arms or legs    Numbness in the groin or genital area  Date Last Reviewed: 6/1/2016 2000-2017 [employer]. [address] [place] [address], [place] [address]. All rights reserved. This information is not intended as a substitute for professional medical care. Always follow your healthcare professional's instructions.

## 2018-04-23 NOTE — NURSING NOTE
"Chief Complaint   Patient presents with     Musculoskeletal Problem     Pt is here to discuss some back pain, hip left pain and left leg       Initial /78 (BP Location: Right arm, Patient Position: Sitting, Cuff Size: Adult Large)  Pulse 82  Temp 98.7  F (37.1  C) (Temporal)  Wt 323 lb 6.4 oz (146.7 kg)  SpO2 99%  BMI 48.45 kg/m2 Estimated body mass index is 48.45 kg/(m^2) as calculated from the following:    Height as of 1/10/18: 5' 8.5\" (1.74 m).    Weight as of this encounter: 323 lb 6.4 oz (146.7 kg).  Medication Reconciliation: complete   Brando Mcguire MA  "

## 2018-04-23 NOTE — TELEPHONE ENCOUNTER
Patient informed of results.    This is basically another term for spondylolisthesis. Anterolisthesis is a spine condition in which the upper vertebral body, the drum-shaped area in front of each vertebrae, slips forward onto the vertebra below. The amount of slippage is graded on a scale from 1 to 4. Grade 1 is mild (less than 25% slippage), while grade 4 is severe (greater than 75% slippage).The symptoms of anterolisthesis can vary greatly depending if and how much the slippage pinches the nerve roots and what area is affected    There are different degrees of slippage, may need further imaging follow up.  If pain continues despite treatment (rest, OTC pain med, muscle relaxer flexeril, physical therapy) may need to see sports medicine for further evaluation.      Patient verbalized understanding.  Nimisha Landeros RN,   [facility]

## 2018-04-23 NOTE — TELEPHONE ENCOUNTER
Notes Recorded by Jose Franklin MD on 4/23/2018 at 11:50 AM  Report to be sent to the patient and follow up as she responds to treatment prescribed.   IMPRESSION: Mild to moderate anterolisthesis of L5 in relation to S1  with moderate disc space narrowing, and a pars defect at this level.    [Consider Follow Up: Anterolisthesis of L5 in relation to S1, with  pars defect at this level]       Findings to be Reviewed           Finding Comment Entered On Entered By       Unexpected   04/23/2018 10:59 AM Lillian Good MD   [phone]           Details        Reading Physician Reading Date Result Priority       Lillian Good MD 4/23/2018            Narrative             Exam: 3 views of the lumbar spine dated 4/23/2018.    COMPARISON: None.    CLINICAL HISTORY: Back pain.    FINDINGS: AP and lateral views of the lumbar spine were obtained.  There are 5 lumbar type vertebral bodies for the purposes of this  dictation. Mild to moderate anterolisthesis of L5 in relation to S1  with disc space narrowing, and a pars defect at this level. Remaining  disc space are well preserved.             Impression             IMPRESSION: Mild to moderate anterolisthesis of L5 in relation to S1  with moderate disc space narrowing, and a pars defect at this level.    [Consider Follow Up: Anterolisthesis of L5 in relation to S1, with  pars defect at this level]    This report will be copied to the [facility] to ensure a  provider acknowledges the finding.     LILLIAN GOOD MD            10:51 AM       Radiologist flags Anterolisthesis of L5 in relation to S1, with     Resulting Agency Rad Rslts           Last Resulted: 04/23/18 10:59 AM                 PLAN:  For acute pain, rest, intermittent application of heat (do not sleep on heating pad), analgesics and muscle relaxants are recommended. Discussed longer term treatment plan of prn NSAID's and discussed a home back care exercise program with flexion  exercise routine. Proper lifting with avoidance of heavy lifting discussed. Consider Physical Therapy and XRay studies if not improving. Call or return to clinic prn if these symptoms worsen or fail to improve as anticipated.

## 2018-04-23 NOTE — LETTER
April 23, 2018      RE: Bev Araujo  [address]  [facility] [address]       To whom it may concern:    Bev Araujo was seen in our clinic today. She  may return to work with the following: Light duty-unable to repetitively lift more than 30 pounds, no single person pat lifts, limited squatting and bending as her pain dictates for the next 7-10 days or as physical therapy recommends.    Sincerely,      Jose Franklin MD

## 2018-04-26 ENCOUNTER — THERAPY VISIT (OUTPATIENT)
Dept: PHYSICAL THERAPY | Facility: CLINIC | Age: 18
End: 2018-04-26
Payer: OTHER MISCELLANEOUS

## 2018-04-26 DIAGNOSIS — M54.42 LEFT-SIDED LOW BACK PAIN WITH LEFT-SIDED SCIATICA, UNSPECIFIED CHRONICITY: ICD-10-CM

## 2018-04-26 PROCEDURE — 97110 THERAPEUTIC EXERCISES: CPT | Mod: GP | Performed by: PHYSICAL THERAPIST

## 2018-05-07 ENCOUNTER — THERAPY VISIT (OUTPATIENT)
Dept: PHYSICAL THERAPY | Facility: CLINIC | Age: 18
End: 2018-05-07
Payer: OTHER MISCELLANEOUS

## 2018-05-07 DIAGNOSIS — M54.42 LEFT-SIDED LOW BACK PAIN WITH LEFT-SIDED SCIATICA, UNSPECIFIED CHRONICITY: ICD-10-CM

## 2018-05-07 PROCEDURE — 97110 THERAPEUTIC EXERCISES: CPT | Mod: GP | Performed by: PHYSICAL THERAPIST

## 2018-05-14 ENCOUNTER — THERAPY VISIT (OUTPATIENT)
Dept: PHYSICAL THERAPY | Facility: CLINIC | Age: 18
End: 2018-05-14
Payer: OTHER MISCELLANEOUS

## 2018-05-14 DIAGNOSIS — M54.42 LEFT-SIDED LOW BACK PAIN WITH LEFT-SIDED SCIATICA, UNSPECIFIED CHRONICITY: ICD-10-CM

## 2018-05-14 PROCEDURE — 97110 THERAPEUTIC EXERCISES: CPT | Mod: GP | Performed by: PHYSICAL THERAPIST

## 2018-05-22 ENCOUNTER — THERAPY VISIT (OUTPATIENT)
Dept: PHYSICAL THERAPY | Facility: CLINIC | Age: 18
End: 2018-05-22
Payer: OTHER MISCELLANEOUS

## 2018-05-22 DIAGNOSIS — M54.42 LEFT-SIDED LOW BACK PAIN WITH LEFT-SIDED SCIATICA, UNSPECIFIED CHRONICITY: ICD-10-CM

## 2018-05-22 PROCEDURE — 97110 THERAPEUTIC EXERCISES: CPT | Mod: GP | Performed by: PHYSICAL THERAPY ASSISTANT

## 2018-06-01 ENCOUNTER — THERAPY VISIT (OUTPATIENT)
Dept: PHYSICAL THERAPY | Facility: CLINIC | Age: 18
End: 2018-06-01
Payer: OTHER MISCELLANEOUS

## 2018-06-01 DIAGNOSIS — M54.42 LEFT-SIDED LOW BACK PAIN WITH LEFT-SIDED SCIATICA, UNSPECIFIED CHRONICITY: ICD-10-CM

## 2018-06-01 PROCEDURE — 97110 THERAPEUTIC EXERCISES: CPT | Mod: GP | Performed by: PHYSICAL THERAPIST

## 2018-06-08 ENCOUNTER — THERAPY VISIT (OUTPATIENT)
Dept: PHYSICAL THERAPY | Facility: CLINIC | Age: 18
End: 2018-06-08
Payer: OTHER MISCELLANEOUS

## 2018-06-08 DIAGNOSIS — M54.42 LEFT-SIDED LOW BACK PAIN WITH LEFT-SIDED SCIATICA, UNSPECIFIED CHRONICITY: ICD-10-CM

## 2018-06-08 PROCEDURE — 97110 THERAPEUTIC EXERCISES: CPT | Mod: GP | Performed by: PHYSICAL THERAPIST

## 2018-06-08 NOTE — PROGRESS NOTES
"Subjective:  HPI  Oswestry Score: 20 %                 Objective:  System    Physical Exam    General     ROS    Assessment/Plan:    PROGRESS  REPORT    Progress reporting period is from 4/23/2018 to 6/8/2018.       SUBJECTIVE  Patient relates that her L knee has been more bothersome this past week as compared to the L LE and lower back.  Tightness around the L knee is her main complaint this morning.  Has had increased back pain when standing (longest time standing over the past week was about an hour).  Was a little better when walked around after the standing.  Today when walking into PT this morning she could feel the symptoms for the L leg (sore and uncomfortable for the entire LE).  Notes that if she feels a pop in the SI area, she may gain relief in the symptoms; does not try to pop her back but sometimes when she moves she will produce a pop.  Symptoms are seeming to be very inconsistent/variable and patient is questioning if she will ever get better.      Did note that she was better prior to catching a resident from falling on May 13th.  Since then, her symptoms have tended to be a little worse.  Sometimes her symptoms would improve with flexion rotation postioning, but this was not consistent.  (at times the positioning would make the L LE worse).   Currently on leave from work and does feel she may be a little better with the rest, but not much.  Current Pain level: 4/10.     Initial Pain level: 8/10.   Changes in function:  Yes (See Goal flowsheet attached for changes in current functional level)  Adverse reaction to treatment or activity: activity - a quick bend with weight caused her symptoms to return on May 13, 2018.  Response to PT has been variable.    OBJECTIVE  Changes noted in objective findings:  Yes, improved ROM, no change in TRENT score, decline in STarT Back score.  Objective:   LROM FIS FT 3\" above the floor, EIS nil loss with inc L knee pain, B SGIS nil loss without symptoms.    Inc LROM " without knee pain after prone lying in sustained lumbar extension with the L LE on the table, R off.  At evaluation patient had better flexion ROM than today, but EIS and B SGIS are better today (they were moderate losses)   TRENT score is at 20%; Erika STarT Back score was low at evaluation, High today as patient is feeling like she does not know whether her pain will ever get better and stay better.          ASSESSMENT/PLAN  Updated problem list and treatment plan: Diagnosis 1:  Low back pain with L LE symptoms    Pain -  manual therapy, self management, education, directional preference exercise and home program  Decreased ROM/flexibility - manual therapy, therapeutic exercise and home program  Decreased joint mobility - manual therapy, therapeutic exercise and home program  Decreased function - therapeutic activities and home program  Impaired posture - neuro re-education, therapeutic activities and home program  STG/LTGs have been met or progress has been made towards goals:  Yes (See Goal flow sheet completed today.)  Assessment of Progress: The patient's condition has potential to improve.  Overall her progress has been slower than what would be expected.  Questioning SI jt vs lumbar derangement.  Did well with unilateral extension today.  Self Management Plans:  Patient has been instructed in a home treatment program.  Patient  has been instructed in self management of symptoms.  I have re-evaluated this patient and find that the nature, scope, duration and intensity of the therapy is appropriate for the medical condition of the patient.  Bev continues to require the following intervention to meet STG and LTG's:  PT    Recommendations:  This patient would benefit from further evaluation.  Progress has been slow.      Please refer to the daily flowsheet for treatment today, total treatment time and time spent performing 1:1 timed codes.

## 2018-06-15 ENCOUNTER — THERAPY VISIT (OUTPATIENT)
Dept: PHYSICAL THERAPY | Facility: CLINIC | Age: 18
End: 2018-06-15
Payer: OTHER MISCELLANEOUS

## 2018-06-15 DIAGNOSIS — M54.42 LEFT-SIDED LOW BACK PAIN WITH LEFT-SIDED SCIATICA, UNSPECIFIED CHRONICITY: ICD-10-CM

## 2018-06-15 PROCEDURE — 97110 THERAPEUTIC EXERCISES: CPT | Mod: GP | Performed by: PHYSICAL THERAPIST

## 2018-06-15 PROCEDURE — 97530 THERAPEUTIC ACTIVITIES: CPT | Mod: GP | Performed by: PHYSICAL THERAPIST

## 2018-06-22 ENCOUNTER — THERAPY VISIT (OUTPATIENT)
Dept: PHYSICAL THERAPY | Facility: CLINIC | Age: 18
End: 2018-06-22
Payer: OTHER MISCELLANEOUS

## 2018-06-22 DIAGNOSIS — M54.42 LEFT-SIDED LOW BACK PAIN WITH LEFT-SIDED SCIATICA, UNSPECIFIED CHRONICITY: ICD-10-CM

## 2018-06-22 PROCEDURE — 97112 NEUROMUSCULAR REEDUCATION: CPT | Mod: GP | Performed by: PHYSICAL THERAPY ASSISTANT

## 2018-06-22 PROCEDURE — 97110 THERAPEUTIC EXERCISES: CPT | Mod: GP | Performed by: PHYSICAL THERAPY ASSISTANT

## 2018-06-22 NOTE — LETTER
"[facility]  [address]  [facility] [address]  [phone]    2018    Re: Bev Araujo   :   2000  MRN:  [id number]   REFERRING PHYSICIAN:   Jose Franklin    [facility]  Date of Initial Evaluation: 18  Visits:  Rxs Used: 9  Reason for Referral:  Left-sided low back pain with left-sided sciatica, unspecified chronicity    Oswestry Score: 26 %                 PROGRESS  REPORT  Progress reporting period is from 18 to 18.       SUBJECTIVE    Subjective changes noted by patient:  Pt reports having more of an ache vs sharp pain today and uncomfortable finding a position to sit in while eating breakfast this morning. Since returning to work last week stil lhaving pain but feels a little better then the last time seeing MD-does not feel it made her pain worse. Continuing to have pain down L leg starting at hip and going down to foot. Foot hasn't been as intense of a tingle and is now intermittent but still present. L knee feel like it needs to \"crack\" but doesn't and sharp pain at L and into thigh. HEP is helpful for about an hour and needs to repeat to get further results. After an hour of sitting will feel the ache in leg and worsens as time goes on.  Does work today and this weekend so will have a better idea of how back is doing in regards to work and work restrictions and will discuss with MD on 18.      Current Pain level:  (4-5/10 PL).      Initial Pain level: 8/10.   Changes in function:  Yes (See Goal flowsheet attached for changes in current functional level)  Adverse reaction to treatment or activity: None    OBJECTIVE    Changes noted in objective findings:  No change in ROM over past 3 weeks (continues to be nil/min loss). Able to decrease or abolish leg symptoms with prone lying L leg on plinth for L hip/thigh pain and sustained rotation/flex in L sidelying for L " shin/lower leg symptoms. Able to begin core and back strengthening today without increase in symptoms or loss of ROM.   Oswestry has decreased from 20% to 26%      Today'sObjective:   LROM FIS: min loss with pull at calves; EIS min loss mod pain down L leg; L SG min loss; R SG min los with mod pain down leg;     ASSESSMENT/PLAN  Updated problem list and treatment plan:     Diagnosis 1:  Low back pain with L LE symptoms    Pain -  self management, education, directional preference exercise and home program  Decreased ROM/flexibility - therapeutic exercise and home program  Decreased strength - therapeutic exercise and therapeutic activities  Decreased function - therapeutic activities and home program  Impaired posture - neuro re-education and home program    STG/LTGs have been met or progress has been made towards goals:  Yes (See Goal flow sheet completed today.)    Assessment of Progress: The patient's condition is slowly improving.    Patient is slowly meeting short term goals and is progressing towards long term goals.  Self Management Plans:  Patient has been instructed in a home treatment program.  Patient  has been instructed in self management of symptoms.    I have discussed this patient with primary PT and find that the nature, scope, duration and intensity of the therapy is appropriate for the medical condition of the patient.    Bev continues to require the following intervention to meet STG and LTG's:  PT    Recommendations:  This patient would benefit from further evaluation.    The progress note/discharge summary was written in collaboration with and reviewed by the physical therapist.    Thank you for your referral.    INQUIRIES  Therapist: Sowmya Galan PTA for Archana Valera, PT, Dip. MDT, FAAOMPT   [facility] PHYSICAL THERAP[facility]  [address]  [facility] [address]  Phone: [phone]  Fax: [phone]

## 2018-06-22 NOTE — PROGRESS NOTES
"Subjective:  HPI  Oswestry Score: 26 %                 Objective:  System    Physical Exam    General     ROS    Assessment/Plan:    PROGRESS  REPORT    Progress reporting period is from 6-8-18 to 6-22-18.       SUBJECTIVE  Subjective changes noted by patient:  Pt reports having more of an ache vs sharp pain today and uncomfortable finding a position to sit in while eating breakfast this morning. Since returning to work last week stil lhaving pain but feels a little better then the last time seeing MD-does not feel it made her pain worse. Continuing to have pain down L leg starting at hip and going down to foot. Foot hasn't been as intense of a tingle and is now intermittent but still present. L knee feel like it needs to \"crack\" but doesn't and sharp pain at L and into thigh. HEP is helpful for about an hour and needs to repeat to get further results. After an hour of sitting will feel the ache in leg and worsens as time goes on.  Does work today and this weekend so will have a better idea of how back is doing in regards to work and work restrictions and will discuss with MD on 6/26/18.    Current Pain level:  (4-5/10 PL).      Initial Pain level: 8/10.   Changes in function:  Yes (See Goal flowsheet attached for changes in current functional level)  Adverse reaction to treatment or activity: None    OBJECTIVE  Changes noted in objective findings:  No change in ROM over past 3 weeks (continues to be nil/min loss). Able to decrease or abolish leg symptoms with prone lying L leg on plinth for L hip/thigh pain and sustained rotation/flex in L sidelying for L shin/lower leg symptoms. Able to begin core and back strengthening today without increase in symptoms or loss of ROM.   Oswestry has decreased from 20% to 26%      Today'sObjective:   LROM FIS: min loss with pull at calves; EIS min loss mod pain down L leg; L SG min loss; R SG min los with mod pain down leg;       ASSESSMENT/PLAN  Updated problem list and " treatment plan: Diagnosis 1:  Low back pain with L LE symptoms    Pain -  self management, education, directional preference exercise and home program  Decreased ROM/flexibility - therapeutic exercise and home program  Decreased strength - therapeutic exercise and therapeutic activities  Decreased function - therapeutic activities and home program  Impaired posture - neuro re-education and home program  STG/LTGs have been met or progress has been made towards goals:  Yes (See Goal flow sheet completed today.)  Assessment of Progress: The patient's condition is slowly improving.  Patient is slowly meeting short term goals and is progressing towards long term goals.  Self Management Plans:  Patient has been instructed in a home treatment program.  Patient  has been instructed in self management of symptoms.  I have discussed this patient with primary PT and find that the nature, scope, duration and intensity of the therapy is appropriate for the medical condition of the patient.  Bev continues to require the following intervention to meet STG and LTG's:  PT    Recommendations:  This patient would benefit from further evaluation.  The progress note/discharge summary was written in collaboration with and reviewed by the physical therapist.    Please refer to the daily flowsheet for treatment today, total treatment time and time spent performing 1:1 timed codes.

## 2018-06-22 NOTE — MR AVS SNAPSHOT
After Visit Summary   6/22/2018    Bev Araujo    MRN: [id number]           Patient Information     Date Of Birth          2000        Visit Information        Provider Department      6/22/2018 9:00 AM Sowmya Galan PTA [facility] Physical [facility]        Today's Diagnoses     Left-sided low back pain with left-sided sciatica, unspecified chronicity           Follow-ups after your visit        Your next 10 appointments already scheduled     Jun 29, 2018  9:00 AM CDT   [facility] with Archana Valera, PT   [facility] ([facility]  )    [address]  [facility] [address] [phone]              Who to contact     If you have questions or need follow up information about today's clinic visit or your schedule please contact [facility] PHYSICAL THERAP[facility] directly at [phone].  Normal or non-critical lab and imaging results will be communicated to you by MyChart, letter or phone within 4 business days after the clinic has received the results. If you do not hear from us within 7 days, please contact the clinic through [employer] or phone. If you have a critical or abnormal lab result, we will notify you by phone as soon as possible.  Submit refill requests through [employer] or call your pharmacy and they will forward the refill request to us. Please allow 3 business days for your refill to be completed.          Additional Information About Your Visit        MyChart Information     [employer] gives you secure access to your electronic health record. If you see a primary care provider, you can also send messages to your care team and make appointments. If you have questions, please call your primary care clinic.  If you do not have a primary care provider, please call [phone] and they will assist you.        Care EveryWhere ID     This is your Care EveryWhere ID. This could be used by other  organizations to access your [place] medical records  [id number]         Blood Pressure from Last 3 Encounters:   04/23/18 120/78   01/10/18 128/78   11/06/17 (!) 158/93    Weight from Last 3 Encounters:   04/23/18 146.7 kg (323 lb 6.4 oz)   01/10/18 (!) 144.2 kg (318 lb) (>99 %)*   11/06/17 (!) 146.6 kg (323 lb 3.2 oz) (>99 %)*     * Growth percentiles are based on [facility] 2-20 Years data.              We Performed the Following     [facility] PROGRESS NOTES REPORT     NEUROMUSCULAR RE-EDUCATION     THERAPEUTIC EXERCISES        Primary Care Provider Office Phone # Fax #    Linnea Mars,  [phone] [address]  [facility] [address]        Equal Access to Services     ZONIA LÓPEZ : Hadii elidia robles hadasho Soomaali, waaxda luqadaha, qaybta kaalmada adeegyada, [place] hayamanda pascal . So [facility] [phone].    ATENCIÓN: Si habla español, tiene a mcfarland disposición servicios gratuitos de asistencia lingüística. Llame al [phone].    We comply with applicable federal civil rights laws and [place] laws. We do not discriminate on the basis of race, color, national origin, age, disability, sex, sexual orientation, or gender identity.            Thank you!     Thank you for choosing [facility] PHYSICAL THERAP[facility]  for your care. Our goal is always to provide you with excellent care. Hearing back from our patients is one way we can continue to improve our services. Please take a few minutes to complete the written survey that you may receive in the mail after your visit with us. Thank you!             Your Updated Medication List - Protect others around you: Learn how to safely use, store and throw away your medicines at www.disposemymeds.org.          This list is accurate as of 6/22/18 10:58 AM.  Always use your most recent med list.                   Brand Name Dispense Instructions for use Diagnosis    cyclobenzaprine 10 MG tablet    FLEXERIL    14 tablet     Take 1 tablet (10 mg) by mouth nightly as needed for muscle spasms    Pain of back and left lower extremity       norethindrone 0.35 MG per tablet    MICRONOR    84 tablet    Take 1 tablet (0.35 mg) by mouth daily    General counseling for prescription of oral contraceptives

## 2018-06-29 ENCOUNTER — THERAPY VISIT (OUTPATIENT)
Dept: PHYSICAL THERAPY | Facility: CLINIC | Age: 18
End: 2018-06-29
Payer: OTHER MISCELLANEOUS

## 2018-06-29 DIAGNOSIS — M54.42 LEFT-SIDED LOW BACK PAIN WITH LEFT-SIDED SCIATICA, UNSPECIFIED CHRONICITY: ICD-10-CM

## 2018-06-29 PROCEDURE — 97110 THERAPEUTIC EXERCISES: CPT | Mod: GP | Performed by: PHYSICAL THERAPIST

## 2018-06-29 PROCEDURE — 97112 NEUROMUSCULAR REEDUCATION: CPT | Mod: GP | Performed by: PHYSICAL THERAPIST

## 2018-07-02 ENCOUNTER — ALLIED HEALTH/NURSE VISIT (OUTPATIENT)
Dept: NURSING | Facility: CLINIC | Age: 18
End: 2018-07-02
Payer: COMMERCIAL

## 2018-07-02 DIAGNOSIS — Z23 NEED FOR HPV VACCINATION: Primary | ICD-10-CM

## 2018-07-02 PROCEDURE — 90471 IMMUNIZATION ADMIN: CPT

## 2018-07-02 PROCEDURE — 90651 9VHPV VACCINE 2/3 DOSE IM: CPT

## 2018-07-02 PROCEDURE — 99207 ZZC NO CHARGE NURSE ONLY: CPT

## 2018-07-02 NOTE — NURSING NOTE
Screening Questionnaire for Adult Immunization    Are you sick today?   No   Do you have allergies to medications, food, a vaccine component or latex?   No   Have you ever had a serious reaction after receiving a vaccination?   No   Do you have a long-term health problem with heart disease, lung disease, asthma, kidney disease, metabolic disease (e.g. diabetes), anemia, or other blood disorder?   No   Do you have cancer, leukemia, HIV/AIDS, or any other immune system problem?   No   In the past 3 months, have you taken medications that affect  your immune system, such as prednisone, other steroids, or anticancer drugs; drugs for the treatment of rheumatoid arthritis, Crohn s disease, or psoriasis; or have you had radiation treatments?   No   Have you had a seizure, or a brain or other nervous system problem?   No   During the past year, have you received a transfusion of blood or blood     products, or been given immune (gamma) globulin or antiviral drug?   No   For women: Are you pregnant or is there a chance you could become        pregnant during the next month?   No   Have you received any vaccinations in the past 4 weeks?   No     Immunization questionnaire answers were all negative.        Valencia Barrera, Medical Assistant   Patient instructed to remain in clinic for 15 minutes afterwards, and to report any adverse reaction to me immediately.       Screening performed by Valencia Barrera on 7/2/2018 at 9:15 AM.

## 2018-07-02 NOTE — MR AVS SNAPSHOT
After Visit Summary   7/2/2018    Bev Araujo    MRN: [id number]           Patient Information     Date Of Birth          2000        Visit Information        Provider Department      7/2/2018 9:00 AM [facility]        Today's Diagnoses     Need for HPV vaccination    -  1       Follow-ups after your visit        Your next 10 appointments already scheduled     Jul 16, 2018  9:00 AM CDT   [facility] with Archana Valera, PT   [facility] ([facility]  )    [address]  [facility] [address]   [phone]            Jul 23, 2018  9:00 AM CDT   [facility] with Archana Valera, PT   [facility] ([facility]  )    [address]  [facility] [address]   [phone]            Aug 06, 2018  9:00 AM CDT   [facility] with Archana Valera, PT   [facility] ([facility]  )    [address]  [facility] [address]   [phone]              Who to contact     If you have questions or need follow up information about today's clinic visit or your schedule please contact [facility] directly at [phone].  Normal or non-critical lab and imaging results will be communicated to you by Tueehart, letter or phone within 4 business days after the clinic has received the results. If you do not hear from us within 7 days, please contact the clinic through MyChart or phone. If you have a critical or abnormal lab result, we will notify you by phone as soon as possible.  Submit refill requests through QuickGifts or call your pharmacy and they will forward the refill request to us. Please allow 3 business days for your refill to be completed.          Additional Information About Your Visit        Tueehart Information     QuickGifts gives you secure access to your electronic health record. If you see a primary  care provider, you can also send messages to your care team and make appointments. If you have questions, please call your primary care clinic.  If you do not have a primary care provider, please call [phone] and they will assist you.        Care EveryWhere ID     This is your Care EveryWhere ID. This could be used by other organizations to access your [facility] medical records  [id number]         Blood Pressure from Last 3 Encounters:   04/23/18 120/78   01/10/18 128/78   11/06/17 (!) 158/93    Weight from Last 3 Encounters:   04/23/18 146.7 kg (323 lb 6.4 oz)   01/10/18 (!) 144.2 kg (318 lb) (>99 %)*   11/06/17 (!) 146.6 kg (323 lb 3.2 oz) (>99 %)*     * Growth percentiles are based on [facility] 2-20 Years data.              We Performed the Following     HC HPV VAC 9V 3 DOSE IM     VACCINE ADMINISTRATION, INITIAL        Primary Care Provider Office Phone # Fax #    Linnea Kirby MadisynDO [phone]       [address]  [facility] [address]        Equal Access to Services     [facility] : Hadii aad ku hadasho Soomaali, waaxda luqadaha, qaybta kaalmada adeegyada, [place] hayvladimirn kiya pascal . So [facility] [phone].    ATENCIÓN: Si habla español, tiene a mcfarland disposición servicios gratuitos de asistencia lingüística. Lorenzoame al [phone].    We comply with applicable federal civil rights laws and [place] laws. We do not discriminate on the basis of race, color, national origin, age, disability, sex, sexual orientation, or gender identity.            Thank you!     Thank you for choosing [facility]  for your care. Our goal is always to provide you with excellent care. Hearing back from our patients is one way we can continue to improve our services. Please take a few minutes to complete the written survey that you may receive in the mail after your visit with us. Thank you!             Your Updated Medication List - Protect others around you: Learn how to safely use,  store and throw away your medicines at www.disposemymeds.org.          This list is accurate as of 7/2/18  9:20 AM.  Always use your most recent med list.                   Brand Name Dispense Instructions for use Diagnosis    cyclobenzaprine 10 MG tablet    FLEXERIL    14 tablet    Take 1 tablet (10 mg) by mouth nightly as needed for muscle spasms    Pain of back and left lower extremity       norethindrone 0.35 MG per tablet    MICRONOR    84 tablet    Take 1 tablet (0.35 mg) by mouth daily    General counseling for prescription of oral contraceptives

## 2018-07-16 ENCOUNTER — THERAPY VISIT (OUTPATIENT)
Dept: PHYSICAL THERAPY | Facility: CLINIC | Age: 18
End: 2018-07-16
Payer: OTHER MISCELLANEOUS

## 2018-07-16 DIAGNOSIS — M54.42 LEFT-SIDED LOW BACK PAIN WITH LEFT-SIDED SCIATICA, UNSPECIFIED CHRONICITY: ICD-10-CM

## 2018-07-16 PROCEDURE — 97110 THERAPEUTIC EXERCISES: CPT | Mod: GP | Performed by: PHYSICAL THERAPIST

## 2018-07-23 ENCOUNTER — THERAPY VISIT (OUTPATIENT)
Dept: PHYSICAL THERAPY | Facility: CLINIC | Age: 18
End: 2018-07-23
Payer: COMMERCIAL

## 2018-07-23 DIAGNOSIS — M54.42 LEFT-SIDED LOW BACK PAIN WITH LEFT-SIDED SCIATICA, UNSPECIFIED CHRONICITY: ICD-10-CM

## 2018-07-23 PROCEDURE — 97112 NEUROMUSCULAR REEDUCATION: CPT | Mod: GP | Performed by: PHYSICAL THERAPIST

## 2018-07-23 PROCEDURE — 97110 THERAPEUTIC EXERCISES: CPT | Mod: GP | Performed by: PHYSICAL THERAPIST

## 2018-08-03 ENCOUNTER — THERAPY VISIT (OUTPATIENT)
Dept: PHYSICAL THERAPY | Facility: CLINIC | Age: 18
End: 2018-08-03
Payer: OTHER MISCELLANEOUS

## 2018-08-03 DIAGNOSIS — M54.42 LEFT-SIDED LOW BACK PAIN WITH LEFT-SIDED SCIATICA, UNSPECIFIED CHRONICITY: ICD-10-CM

## 2018-08-03 PROCEDURE — 97110 THERAPEUTIC EXERCISES: CPT | Mod: GP | Performed by: PHYSICAL THERAPY ASSISTANT

## 2018-08-03 PROCEDURE — 97112 NEUROMUSCULAR REEDUCATION: CPT | Mod: GP | Performed by: PHYSICAL THERAPY ASSISTANT

## 2018-08-06 ENCOUNTER — THERAPY VISIT (OUTPATIENT)
Dept: PHYSICAL THERAPY | Facility: CLINIC | Age: 18
End: 2018-08-06
Payer: OTHER MISCELLANEOUS

## 2018-08-06 DIAGNOSIS — M54.42 LEFT-SIDED LOW BACK PAIN WITH LEFT-SIDED SCIATICA, UNSPECIFIED CHRONICITY: ICD-10-CM

## 2018-08-06 PROCEDURE — 97112 NEUROMUSCULAR REEDUCATION: CPT | Mod: GP | Performed by: PHYSICAL THERAPIST

## 2018-08-06 PROCEDURE — 97110 THERAPEUTIC EXERCISES: CPT | Mod: GP | Performed by: PHYSICAL THERAPIST

## 2018-08-06 NOTE — PROGRESS NOTES
"Subjective:  HPI  Oswestry Score: 22 %                 Objective:  System    Physical Exam    General     ROS    Assessment/Plan:    PROGRESS  REPORT    Progress reporting period is from 6/8/2018 to 8/6/2018.       SUBJECTIVE  Overall, may be 25% improved with regards to her L lower back and L LE symptoms.   Progress has been slow and does continue to be on light duty at work.  Patient relates that she had a bad day yesterday.  She was doing equal amounts of standing and sitting while at work.  Does well sitting in a recliner with the L LE in part extension and did feel good in that position.  Painful with transitional movements - getting down onto the floor is more painful than getting up from the floor.  Patient relates that she will see PT next week for a MedX evaluation.  She will feel most of her pain in the L knee with sitting.  Strengthening is going ok - does need to work on the order of her exercises to be able to do them without pain (bridging is the most painful)  Patient does find that she is sleeping better since the onset of PT.  Tingling of the toes for the L foot has been better.  The tingling tends to be produced if she sits or lays in an awkward position.      Current Pain level:  4-5/10     Previous pain level was  4/10   Initial Pain level: 8/10.   Changes in function:  Yes, sleeping has improved, sitting continues to be comfortable, walking/standing tolerance has remained about 1.5-2 hours for the past few weeks.  Adverse reaction to treatment or activity: does not tend to feel worse after PT but there does tend to be an impact on her LE strength and nerve root tension with her positioning/strengthening.     OBJECTIVE  Changes noted in objective findings:  None - no sig change single last PN  Objective:   LROM  FIS finger tips about 2-3\" above floor, EIS min loss with inc symptoms down the L LE, R SGIS nil loss, NE, L SGIS nil/min loss with inc L LE symptoms.   MMT - Decreased strength left " Great toe extension and calf raises (L5-S1) otherwise strong for B LE.  Stronger pull on the L with seat SLR.    Reflexes were symmetrical but difficult to elicit.     Increased weakness of the great toe extension but dec tension with seated SLR and inc strength with calf raise after sustained rotation in flexion when laying on her R side; this position tends to decrease her symptoms but without objective improvement.    (great toe ext 3+/5  No worsening of objective measures after bridges  Did feel better and had improved strength, decreased tension after supine abdominal stabilization exercise     Erika STarT score remains high at 6.  (was low at evaluation).  TRENT - no significant change; 22% today, 20% at last PN.    ASSESSMENT/PLAN  Updated problem list and treatment plan: Diagnosis 1:  L LBP W L LE symptoms (spondylolisthesis) Pain -  self management and home program  Decreased ROM/flexibility - therapeutic exercise and home program  Decreased strength - therapeutic exercise, therapeutic activities and home program  Impaired muscle performance - neuro re-education and home program  Decreased function - therapeutic activities and home program  STG/LTGs have been met or progress has been made towards goals:  Yes, no change over the past week but generally her walking/standing tolerance has improved since the outset of PT.  Assessment of Progress: The patient's progress has slowed.  Self Management Plans:  Patient has been instructed in a home treatment program.  Patient  has been instructed in self management of symptoms.  I have re-evaluated this patient and find that the nature, scope, duration and intensity of the therapy is appropriate for the medical condition of the patient.  Bev continues to require the following intervention to meet STG and LTG's:  PT    Recommendations:  Patient will begin a MedX program on 8/13/2018, outside our system.  Will put PT on hold through [facility].  Patient will contact this  office with update on progress.  Will DC if recommended by MD, begin/continue a MedX/strengthening program.     Please refer to the daily flowsheet for treatment today, total treatment time and time spent performing 1:1 timed codes.

## 2018-10-03 ENCOUNTER — OFFICE VISIT (OUTPATIENT)
Dept: PEDIATRICS | Facility: CLINIC | Age: 18
End: 2018-10-03
Payer: COMMERCIAL

## 2018-10-03 ENCOUNTER — OFFICE VISIT (OUTPATIENT)
Dept: OBGYN | Facility: CLINIC | Age: 18
End: 2018-10-03
Payer: COMMERCIAL

## 2018-10-03 VITALS
BODY MASS INDEX: 44.41 KG/M2 | HEIGHT: 68 IN | SYSTOLIC BLOOD PRESSURE: 138 MMHG | TEMPERATURE: 98.8 F | DIASTOLIC BLOOD PRESSURE: 85 MMHG | HEART RATE: 82 BPM | WEIGHT: 293 LBS | OXYGEN SATURATION: 96 %

## 2018-10-03 VITALS
HEIGHT: 68 IN | WEIGHT: 293 LBS | HEART RATE: 77 BPM | SYSTOLIC BLOOD PRESSURE: 149 MMHG | OXYGEN SATURATION: 97 % | DIASTOLIC BLOOD PRESSURE: 103 MMHG | BODY MASS INDEX: 44.41 KG/M2

## 2018-10-03 DIAGNOSIS — Z01.419 WOMEN'S ANNUAL ROUTINE GYNECOLOGICAL EXAMINATION: Primary | ICD-10-CM

## 2018-10-03 DIAGNOSIS — R73.01 ELEVATED FASTING BLOOD SUGAR: ICD-10-CM

## 2018-10-03 DIAGNOSIS — R03.0 ELEVATED BP WITHOUT DIAGNOSIS OF HYPERTENSION: Primary | ICD-10-CM

## 2018-10-03 DIAGNOSIS — E66.01 MORBID OBESITY WITH BMI OF 45.0-49.9, ADULT (H): ICD-10-CM

## 2018-10-03 DIAGNOSIS — Z11.8 SPECIAL SCREENING EXAMINATION FOR CHLAMYDIAL DISEASE: ICD-10-CM

## 2018-10-03 DIAGNOSIS — Z30.09 GENERAL COUNSELING FOR PRESCRIPTION OF ORAL CONTRACEPTIVES: ICD-10-CM

## 2018-10-03 PROBLEM — E11.9 DIABETES MELLITUS, TYPE 2 (H): Status: RESOLVED | Noted: 2018-10-03 | Resolved: 2018-10-03

## 2018-10-03 LAB
ALBUMIN SERPL-MCNC: 3.3 G/DL (ref 3.4–5)
ALP SERPL-CCNC: 67 U/L (ref 40–150)
ALT SERPL W P-5'-P-CCNC: 80 U/L (ref 0–50)
ANION GAP SERPL CALCULATED.3IONS-SCNC: 7 MMOL/L (ref 3–14)
AST SERPL W P-5'-P-CCNC: 39 U/L (ref 0–45)
BASOPHILS # BLD AUTO: 0.1 10E9/L (ref 0–0.2)
BASOPHILS NFR BLD AUTO: 0.8 %
BILIRUB SERPL-MCNC: 0.2 MG/DL (ref 0.2–1.3)
BUN SERPL-MCNC: 13 MG/DL (ref 7–30)
CALCIUM SERPL-MCNC: 8.8 MG/DL (ref 8.5–10.1)
CHLORIDE SERPL-SCNC: 110 MMOL/L (ref 94–109)
CO2 SERPL-SCNC: 25 MMOL/L (ref 20–32)
CREAT SERPL-MCNC: 0.55 MG/DL (ref 0.52–1.04)
CREAT UR-MCNC: 59 MG/DL
DIFFERENTIAL METHOD BLD: NORMAL
EOSINOPHIL # BLD AUTO: 0.1 10E9/L (ref 0–0.7)
EOSINOPHIL NFR BLD AUTO: 1 %
ERYTHROCYTE [DISTWIDTH] IN BLOOD BY AUTOMATED COUNT: 12.4 % (ref 10–15)
GFR SERPL CREATININE-BSD FRML MDRD: >90 ML/MIN/1.7M2
GLUCOSE SERPL-MCNC: 132 MG/DL (ref 70–99)
HBA1C MFR BLD: 6.5 % (ref 0–5.6)
HCT VFR BLD AUTO: 39.9 % (ref 35–47)
HGB BLD-MCNC: 13.3 G/DL (ref 11.7–15.7)
IMM GRANULOCYTES # BLD: 0.1 10E9/L (ref 0–0.4)
IMM GRANULOCYTES NFR BLD: 0.7 %
LYMPHOCYTES # BLD AUTO: 4.1 10E9/L (ref 0.8–5.3)
LYMPHOCYTES NFR BLD AUTO: 37.8 %
MCH RBC QN AUTO: 29.4 PG (ref 26.5–33)
MCHC RBC AUTO-ENTMCNC: 33.3 G/DL (ref 31.5–36.5)
MCV RBC AUTO: 88 FL (ref 78–100)
MICROALBUMIN UR-MCNC: 32 MG/L
MICROALBUMIN/CREAT UR: 53.7 MG/G CR (ref 0–25)
MONOCYTES # BLD AUTO: 0.6 10E9/L (ref 0–1.3)
MONOCYTES NFR BLD AUTO: 5.9 %
NEUTROPHILS # BLD AUTO: 5.8 10E9/L (ref 1.6–8.3)
NEUTROPHILS NFR BLD AUTO: 53.8 %
PLATELET # BLD AUTO: 359 10E9/L (ref 150–450)
POTASSIUM SERPL-SCNC: 4.1 MMOL/L (ref 3.4–5.3)
PROT SERPL-MCNC: 7 G/DL (ref 6.8–8.8)
RBC # BLD AUTO: 4.52 10E12/L (ref 3.8–5.2)
SODIUM SERPL-SCNC: 142 MMOL/L (ref 133–144)
TSH SERPL DL<=0.005 MIU/L-ACNC: 1.32 MU/L (ref 0.4–4)
WBC # BLD AUTO: 10.8 10E9/L (ref 4–11)

## 2018-10-03 PROCEDURE — 85025 COMPLETE CBC W/AUTO DIFF WBC: CPT | Performed by: FAMILY MEDICINE

## 2018-10-03 PROCEDURE — 36415 COLL VENOUS BLD VENIPUNCTURE: CPT | Performed by: FAMILY MEDICINE

## 2018-10-03 PROCEDURE — 84443 ASSAY THYROID STIM HORMONE: CPT | Performed by: FAMILY MEDICINE

## 2018-10-03 PROCEDURE — 99214 OFFICE O/P EST MOD 30 MIN: CPT | Performed by: FAMILY MEDICINE

## 2018-10-03 PROCEDURE — 80053 COMPREHEN METABOLIC PANEL: CPT | Performed by: FAMILY MEDICINE

## 2018-10-03 PROCEDURE — 99395 PREV VISIT EST AGE 18-39: CPT | Performed by: OBSTETRICS & GYNECOLOGY

## 2018-10-03 PROCEDURE — 82043 UR ALBUMIN QUANTITATIVE: CPT | Performed by: FAMILY MEDICINE

## 2018-10-03 PROCEDURE — 83036 HEMOGLOBIN GLYCOSYLATED A1C: CPT | Performed by: FAMILY MEDICINE

## 2018-10-03 RX ORDER — NAPROXEN 500 MG/1
500 TABLET ORAL PRN
COMMUNITY
Start: 2018-07-30 | End: 2020-09-22

## 2018-10-03 RX ORDER — ACETAMINOPHEN AND CODEINE PHOSPHATE 120; 12 MG/5ML; MG/5ML
0.35 SOLUTION ORAL DAILY
Qty: 84 TABLET | Refills: 3 | Status: SHIPPED | OUTPATIENT
Start: 2018-10-03 | End: 2019-01-25

## 2018-10-03 ASSESSMENT — ANXIETY QUESTIONNAIRES
GAD7 TOTAL SCORE: 4
5. BEING SO RESTLESS THAT IT IS HARD TO SIT STILL: NOT AT ALL
7. FEELING AFRAID AS IF SOMETHING AWFUL MIGHT HAPPEN: NOT AT ALL
IF YOU CHECKED OFF ANY PROBLEMS ON THIS QUESTIONNAIRE, HOW DIFFICULT HAVE THESE PROBLEMS MADE IT FOR YOU TO DO YOUR WORK, TAKE CARE OF THINGS AT HOME, OR GET ALONG WITH OTHER PEOPLE: NOT DIFFICULT AT ALL
2. NOT BEING ABLE TO STOP OR CONTROL WORRYING: SEVERAL DAYS
6. BECOMING EASILY ANNOYED OR IRRITABLE: NOT AT ALL
1. FEELING NERVOUS, ANXIOUS, OR ON EDGE: SEVERAL DAYS
3. WORRYING TOO MUCH ABOUT DIFFERENT THINGS: SEVERAL DAYS

## 2018-10-03 ASSESSMENT — PAIN SCALES - GENERAL: PAINLEVEL: MILD PAIN (3)

## 2018-10-03 ASSESSMENT — PATIENT HEALTH QUESTIONNAIRE - PHQ9: 5. POOR APPETITE OR OVEREATING: SEVERAL DAYS

## 2018-10-03 NOTE — MR AVS SNAPSHOT
After Visit Summary   10/3/2018    Bev Araujo    MRN: [id number]           Patient Information     Date Of Birth          2000        Visit Information        Provider Department      10/3/2018 3:10 PM Dixie Fisher MD [facility]        Today's Diagnoses     Elevated BP without diagnosis of hypertension    -  1    Elevated fasting blood sugar          Care Instructions    Get the labs today  Start on daily BP checks at home  Schedule for BP recheck in 2-3 weeks, bring the log at the visit      Low-Salt Choices  Eating salt (sodium) can make your body retain too much water. Excess water makes your heart work harder. Canned, packaged, and frozen foods are easy to prepare. But they are often high in sodium. Here are some ideas for low-salt foods you can easily make yourself.    For breakfast    Fruit or 100% fruit juice    Whole-wheat bread or an English muffin. Look for sodium content on Nutrition Facts labels.    Low-fat milk or yogurt    Unsalted eggs    Shredded wheat    Corn tortillas    Unsalted steamed rice    Regular (not instant) hot cereal, made without salt  Stay away from:    Sausage, campos, and ham    Flour tortillas    Packaged muffins, pancakes, and biscuits    Instant hot cereals    Cottage cheese  For lunch and dinner    Fresh fish, chicken, turkey, or meat--baked, broiled, or roasted without salt    Dry beans, cooked without salt    Tofu, stir-fried without salt    Unsalted fresh fruit and vegetables, or frozen or canned fruit and vegetables with no added salt  Stay away from:    Lunch or deli meat that is cured or smoked    Cheese    Tomato juice and ketchup    Canned vegetables, soups, and fish not labeled as no-salt-added or reduced sodium    Packaged gravies and sauces    Olives, pickles, and relish    Bottled salad dressings  For snacks and desserts    Yogurt    Unsalted, air-popped popcorn    Unsalted nuts or seeds  Stay away  from:    Pies and cakes    Packaged dessert mixes    Pizza    Canned and packaged puddings    Pretzels, chips, crackers, and nuts--unless the label says unsalted  Date Last Reviewed: 6/1/2017 2000-2017 [employer]. [address] [place] [id number]. All rights reserved. This information is not intended as a substitute for professional medical care. Always follow your healthcare professional's instructions.        Low-Salt Diet  This diet removes foods that are high in salt. It also limits the amount of salt you use when cooking. It is most often used for people with high blood pressure, edema (fluid retention), and kidney, liver, or heart disease.  Table salt contains the mineral sodium. Your body needs sodium to work normally. But too much sodium can make your health problems worse. Your healthcare provider is recommending a low-salt (also called low-sodium) diet for you. Your total daily allowance of salt is 1,500 to 2,300 milligrams (mg). It is less than 1 teaspoon of table salt. This means you can have only about 500 to 700 mg of sodium at each meal. People with certain health problems should limit salt intake to the lower end of the recommended range.    When you cook, don t add much salt. If you can cook without using salt, even better. Don t add salt to your food at the table.  When shopping, read food labels. Salt is often called sodium on the label. Choose foods that are salt-free, low salt, or very low salt. Note that foods with reduced salt may not lower your salt intake enough.    Beans, potatoes, and pasta  Ok: Dry beans, split peas, lentils, potatoes, rice, macaroni, pasta, spaghetti without added salt  Avoid: Potato chips, tortilla chips, and similar products  Breads and cereals  Ok: Low-sodium breads, rolls, cereals, and cakes; low-salt crackers, matzo crackers  Avoid: Salted crackers, pretzels, popcorn, Ivorian toast, pancakes, muffins  Dairy  Ok: Milk, chocolate milk, hot  chocolate mix, low-salt cheeses, and yogurt  Avoid: Processed cheese and cheese spreads; Roquefort, Camembert, and cottage cheese; buttermilk, instant breakfast drink  Desserts  Ok: Ice cream, frozen yogurt, juice bars, gelatin, cookies and pies, sugar, honey, jelly, hard candy  Avoid: Most pies, cakes and cookies prepared or processed with salt; instant pudding  Drinks  Ok: Tea, coffee, fizzy (carbonated) drinks, juices  Avoid: Flavored coffees, electrolyte replacement drinks, sports drinks  Meats  Ok: All fresh meat, fish, poultry, low-salt tuna, eggs, egg substitute  Avoid: Smoked, pickled, brine-cured, or salted meats and fish. This includes campos, chipped beef, corned beef, hot dogs, deli meats, ham, kosher meats, salt pork, sausage, canned tuna, salted codfish, smoked salmon, herring, sardines, or anchovies.  Seasonings and spices  Ok: Most seasonings are okay. Good substitutes for salt include: fresh herb blends, hot sauce, lemon, garlic, wood, vinegar, dry mustard, parsley, cilantro, horseradish, tomato paste, regular margarine, mayonnaise, unsalted butter, cream cheese, vegetable oil, cream, low-salt salad dressing and gravy.  Avoid: Regular ketchup, relishes, pickles, soy sauce, teriyaki sauce, Worcestershire sauce, BBQ sauce, tartar sauce, meat tenderizer, chili sauce, regular gravy, regular salad dressing, salted butter  Soups  Ok: Low-salt soups and broths made with allowed foods  Avoid: Bouillon cubes, soups with smoked or salted meats, regular soup and broth  Vegetables  Ok: Most vegetables are okay; also low-salt tomato and vegetable juices  Avoid: Sauerkraut and other brine-soaked vegetables; pickles and other pickled vegetables; tomato juice, olives  Date Last Reviewed: 8/1/2016 2000-2017 [employer]. [address] [place] [address]. All rights reserved. This information is not intended as a substitute for professional medical care. Always follow your healthcare  professional's instructions.        Kidney Disease: Avoiding High-Sodium Foods  Sodium is a mineral that the body needs in small amounts. Sodium is found in table salt. Table salt is sodium chloride. Most people eat far more salt than they need. There are 2 main reasons for this. Salt is present in high amounts in most processed foods (pre-prepared foods like breakfast cereals, cookies, and pickles) and in all restaurant foods. In other words, if you are not cooking from fresh ingredients at home, you are very likely eating more salt than you need. When sodium intake is too high, it can increase thirst and cause the body to retain fluid. This can increase blood pressure and strain the kidneys. If you have chronic kidney disease, try not to eat the foods listed here, unless the label states that they are made without salt. People with chronic kidney disease should restrict their sodium intake to less than 1,500 mg of sodium (3,800 mg of table salt) each day.      Canned and processed foods, such as gravies, instant cereal, packaged noodles and potato mixes, olives, pickles, soups, vegetables    Cheeses, such as American, Blue, Parmesan, Roquefort    Cured meats, such as campos, beef jerky, bologna, corned beef, ham, hot dogs, sandwich meats, sausages    Fast foods, such as burritos, fish sandwiches, milk shakes, salted French fries, tacos    Frozen foods, such as meat pies, TV dinners, waffles    Salted snacks, such as chips, crackers, peanut popcorn, pretzels, and nuts    Other packaged items, such as antacids, baking soda, bouillon, catsup, lite salt, relish, salted butter and margarine, soy and teriyaki sauce, steak sauce, vegetable juices  Date Last Reviewed: 2/1/2017 2000-2017 [employer]. [address] [place] [address], [place] [address]. All rights reserved. This information is not intended as a substitute for professional medical care. Always follow your healthcare professional's  instructions.        Using Herbs and Spices    Herbs and spices add flavor to cooking without adding fat or sodium. That's why they're great for healthy cooking. Try these tips to help create tasty, healthy meals.  How much to use?  If a recipe calls for: 1 tablespoon of fresh herbs.you can use: 1 teaspoon of dried herbs, Or: 1/4 teaspoon of powdered herbs.   Tips for getting the most of herbs and spices    Use kitchen aron or a sharp knife to cut leaves of fresh herbs. Cutting the leaves finely will release the most flavor.    When using whole spices, don't grind them until you need them. Crushing the berries and seeds with a mortar and pestle or grating whole nutmeg or cinnamon sticks just before adding them to your recipe will guarantee the most flavor.    Dried herbs pack more flavor for the same quantity than fresh herbs, and powdered herbs are more potent than dried flakes. If you are using powdered herbs in a recipe that calls for fresh, you'll want to decrease the amount you add.    When adding fresh or dried spices and herbs to cold recipes, such as dips or salad dressings, allow the food to sit in the refrigerator for at least a couple of hours before serving so the flavors can blend.    Add fresh spices and herbs to hot dishes as close to serving time as possible for the most flavorful results. Dried herbs and spices should be added early in the cooking process to prevent a powdery taste.    The longer dried herbs and spices sit in your cupboard without being used, the more flavor they lose. Whole spices last longer than ground or powdered spices. Store dried herbs and spices in a cool, dry, dark place. Keep powdered herbs and spices for no longer than a year.  Date Last Reviewed: 6/1/2017 2000-2017 [employer]. [address] [place] [address], [place] [address]. All rights reserved. This information is not intended as a substitute for professional medical care. Always follow your healthcare  professional's instructions.        Metabolic Syndrome: Lowering Your Blood Pressure    Metabolic syndrome is a set of five health factors that can lead to serious health problems. The factors greatly increase your risk for diabetes, heart attack, or stroke. High blood pressure (hypertension) is one of the factors of metabolic syndrome.  What is high blood pressure?  High blood pressure is when the force of blood against the inside of your blood vessels is higher than it should be. This makes your heart work harder. And it may damage your blood vessels. High blood pressure means a level of 130/80 mm Hg or more.  Your healthcare provider will usually check your blood pressure each time you have an office visit. Having a high reading at one office visit does not mean that you have high blood pressure. High blood pressure means that your blood pressure is high over a period of time. That is why your healthcare provider checks it at each office visit. He or she can then look at the pattern of your blood pressure. Try to arrive early enough to your appointment so you can rest before your blood pressure is taken. Avoid caffeine and smoking before your pressure is measured.   Making lifestyle changes  Lifestyle changes can help lower your blood pressure. These changes can include:    Losing weight. Even a small amount of weight loss can lower your blood pressure. As you slowly lose weight, you may see your blood pressure gradually get lower.    Quitting smoking. The nicotine in tobacco raises blood pressure. Smoking also increases the risk for other heart and blood vessel diseases, many cancers, and most lung conditions. The first step is to set a quit date. Talk with your healthcare provider about ways to quit smoking. There are programs and medicines that can help.    Eating healthy. Eat plenty of fruits and vegetables. Eat fat-free or low-fat dairy foods, and drink less alcohol.    Eating less salt (sodium). Salt can  raise blood pressure. Try salt-free seasoning, or herbs and spices. Choose low-salt or no-salt snacks, deli meats, and canned foods.    Being more physically active. Physical activity can help lower blood pressure. Get at least 30 minutes of activity on most days. If you are not very active, talk with your healthcare provider before you get started. He or she can help you figure out what is best for you.    Managing stress. Stress can raise blood pressure. Talk with your healthcare provider about lowering your stress level. He or she may advise relaxation methods, a counselor, health , or class.  Taking medicine  Your healthcare provider may also prescribe medicine to help lower your blood pressure. The medicine will help lessen the strain on your heart and help to protect your blood vessels. If you lose weight, you may be able to take less medicine. Or you may no longer need to take it.  Date Last Reviewed: 7/1/2016 2000-2017 [employer]. [address] [place] [address]. All rights reserved. This information is not intended as a substitute for professional medical care. Always follow your healthcare professional's instructions.        Tips for Using Less Salt    Most people with heart problems need to eat less salt (sodium). Reducing the amount of salt you eat may help control your blood pressure. The higher your blood pressure, the greater your risk for heart disease, stroke, blindness, and kidney problems.  At the store    Make low-salt choices by reading labels carefully. Look for the total amount of sodium per serving.    Use more fresh food. Buy more fruits and vegetables. Select lean meats, fish, and poultry.    Use fewer frozen, canned, and packaged foods which often contain a lot of sodium.    Use plain frozen vegetables without sauces or toppings. These products are often low-sodium or no-sodium.    Choose reduced-sodium or no-salt-added versions of canned vegetables and  soups.  In the kitchen    Don't add salt to food when you're cooking. Season with flavorings such as onion, garlic, pepper, salt-free herbal blends, and lemon or lime juice.    Use a cookbook containing low-salt recipes. It can give you ideas for tasty meals that are healthy for your heart.    Sprinkle salt-free herbal blends on vegetables and meat.    Drain and rinse canned foods, such as canned beans and vegetables, before cooking or eating.  Eating out    Tell the  you're on a low-salt diet. Ask questions about the menu.    Order fish, chicken, and meat broiled, baked, poached, or grilled without salt, butter, or breading.    Use lemon, pepper, and salt-free herb mixes to add flavor.    Choose plain steamed rice, boiled noodles, and baked or boiled potatoes. Top potatoes with chives and a little sour cream.     Beware! Salt goes by many other names. Limit foods with these words listed as ingredients: salt, sodium, soy sauce, baking soda, baking powder, MSG, monosodium, Na (the chemical symbol for sodium). Some antacids are also high in salt.   Date Last Reviewed: 6/1/2017 2000-2017 [employer]. [address] [place] [address], [place] [address]. All rights reserved. This information is not intended as a substitute for professional medical care. Always follow your healthcare professional's instructions.                Follow-ups after your visit        Who to contact     If you have questions or need follow up information about today's clinic visit or your schedule please contact [facility] directly at [phone].  Normal or non-critical lab and imaging results will be communicated to you by MyChart, letter or phone within 4 business days after the clinic has received the results. If you do not hear from us within 7 days, please contact the clinic through MyChart or phone. If you have a critical or abnormal lab result, we will notify you by phone as soon as possible.  Submit  "refill requests through [facility] or call your pharmacy and they will forward the refill request to us. Please allow 3 business days for your refill to be completed.          Additional Information About Your Visit        [employer] Information     [employer] gives you secure access to your electronic health record. If you see a primary care provider, you can also send messages to your care team and make appointments. If you have questions, please call your primary care clinic.  If you do not have a primary care provider, please call [phone] and they will assist you.      [employer] is an electronic gateway that provides easy, online access to your medical records. With [employer], you can request a clinic appointment, read your test results, renew a prescription or communicate with your care team.     To access your existing account, please contact your [facility] or call [phone] for assistance.        Care EveryWhere ID     This is your Care EveryWhere ID. This could be used by other organizations to access your [place] medical records  [id number]        Your Vitals Were     Pulse Temperature Height Last Period Pulse Oximetry BMI (Body Mass Index)    82 98.8  F (37.1  C) (Oral) 5' 8\" (1.727 m) 09/24/2018 96% 49.42 kg/m2       Blood Pressure from Last 3 Encounters:   10/03/18 138/85   10/03/18 (!) 149/103   04/23/18 120/78    Weight from Last 3 Encounters:   10/03/18 325 lb (147.4 kg)   10/03/18 325 lb (147.4 kg)   04/23/18 323 lb 6.4 oz (146.7 kg)              We Performed the Following     Albumin Random Urine Quantitative with Creat Ratio     CBC with platelets and differential     Comprehensive metabolic panel     Hemoglobin A1c     TSH with free T4 reflex          Today's Medication Changes          These changes are accurate as of 10/3/18  3:24 PM.  If you have any questions, ask your nurse or doctor.               These medicines have changed or have updated prescriptions.     "    Dose/Directions    * norethindrone 0.35 MG per tablet   Commonly known as:  MICRONOR   This may have changed:  Another medication with the same name was added. Make sure you understand how and when to take each.   Used for:  General counseling for prescription of oral contraceptives   Changed by:  Linnea Mars DO        Dose:  1 tablet   Take 1 tablet (0.35 mg) by mouth daily   Quantity:  84 tablet   Refills:  3       * norethindrone 0.35 MG per tablet   Commonly known as:  MICRONOR   This may have changed:  You were already taking a medication with the same name, and this prescription was added. Make sure you understand how and when to take each.   Used for:  General counseling for prescription of oral contraceptives   Changed by:  Linnea Mars DO        Dose:  0.35 mg   Take 1 tablet (0.35 mg) by mouth daily   Quantity:  84 tablet   Refills:  3       * Notice:  This list has 2 medication(s) that are the same as other medications prescribed for you. Read the directions carefully, and ask your doctor or other care provider to review them with you.      Stop taking these medicines if you haven't already. Please contact your care team if you have questions.     cyclobenzaprine 10 MG tablet   Commonly known as:  FLEXERIL   Stopped by:  Linnea Mars DO                Where to get your medicines      These medications were sent to [facility] [place], [place] - [address], [address]  [address], [facility] [facility] [address]     Phone:  [phone]     norethindrone 0.35 MG per tablet                Primary Care Provider Office Phone # Fax #    Linnea Mars [phone]       [address]  [facility] [address]        Equal Access to Services     [facility] MARIBEL : Sanna calderon Sotung, waaxda luqadaha, qaybta kaalmada sana, costa fitzpatrick. So [facility] [phone].    ATENCIÓN: Bruno michelle  español, tiene a mcfarland disposición servicios gratuitos de asistencia lingüística. Hawk arreaga [phone].    We comply with applicable federal civil rights laws and [place] laws. We do not discriminate on the basis of race, color, national origin, age, disability, sex, sexual orientation, or gender identity.            Thank you!     Thank you for choosing [facility]  for your care. Our goal is always to provide you with excellent care. Hearing back from our patients is one way we can continue to improve our services. Please take a few minutes to complete the written survey that you may receive in the mail after your visit with us. Thank you!             Your Updated Medication List - Protect others around you: Learn how to safely use, store and throw away your medicines at www.disposemymeds.org.          This list is accurate as of 10/3/18  3:24 PM.  Always use your most recent med list.                   Brand Name Dispense Instructions for use Diagnosis    naproxen 500 MG Tbec      Take 500 mg by mouth as needed        * norethindrone 0.35 MG per tablet    MICRONOR    84 tablet    Take 1 tablet (0.35 mg) by mouth daily    General counseling for prescription of oral contraceptives       * norethindrone 0.35 MG per tablet    MICRONOR    84 tablet    Take 1 tablet (0.35 mg) by mouth daily    General counseling for prescription of oral contraceptives       tiZANidine 4 MG tablet    ZANAFLEX     Take 4 mg by mouth At Bedtime        * Notice:  This list has 2 medication(s) that are the same as other medications prescribed for you. Read the directions carefully, and ask your doctor or other care provider to review them with you.

## 2018-10-03 NOTE — PATIENT INSTRUCTIONS
Get the labs today  Start on daily BP checks at home  Schedule for BP recheck in 2-3 weeks, bring the log at the visit      Low-Salt Choices  Eating salt (sodium) can make your body retain too much water. Excess water makes your heart work harder. Canned, packaged, and frozen foods are easy to prepare. But they are often high in sodium. Here are some ideas for low-salt foods you can easily make yourself.    For breakfast    Fruit or 100% fruit juice    Whole-wheat bread or an English muffin. Look for sodium content on Nutrition Facts labels.    Low-fat milk or yogurt    Unsalted eggs    Shredded wheat    Corn tortillas    Unsalted steamed rice    Regular (not instant) hot cereal, made without salt  Stay away from:    Sausage, campos, and ham    Flour tortillas    Packaged muffins, pancakes, and biscuits    Instant hot cereals    Cottage cheese  For lunch and dinner    Fresh fish, chicken, turkey, or meat--baked, broiled, or roasted without salt    Dry beans, cooked without salt    Tofu, stir-fried without salt    Unsalted fresh fruit and vegetables, or frozen or canned fruit and vegetables with no added salt  Stay away from:    Lunch or deli meat that is cured or smoked    Cheese    Tomato juice and ketchup    Canned vegetables, soups, and fish not labeled as no-salt-added or reduced sodium    Packaged gravies and sauces    Olives, pickles, and relish    Bottled salad dressings  For snacks and desserts    Yogurt    Unsalted, air-popped popcorn    Unsalted nuts or seeds  Stay away from:    Pies and cakes    Packaged dessert mixes    Pizza    Canned and packaged puddings    Pretzels, chips, crackers, and nuts--unless the label says unsalted  Date Last Reviewed: 6/1/2017 2000-2017 [employer]. [address] [place] [address], [place] [address]. All rights reserved. This information is not intended as a substitute for professional medical care. Always follow your healthcare professional's  instructions.        Low-Salt Diet  This diet removes foods that are high in salt. It also limits the amount of salt you use when cooking. It is most often used for people with high blood pressure, edema (fluid retention), and kidney, liver, or heart disease.  Table salt contains the mineral sodium. Your body needs sodium to work normally. But too much sodium can make your health problems worse. Your healthcare provider is recommending a low-salt (also called low-sodium) diet for you. Your total daily allowance of salt is 1,500 to 2,300 milligrams (mg). It is less than 1 teaspoon of table salt. This means you can have only about 500 to 700 mg of sodium at each meal. People with certain health problems should limit salt intake to the lower end of the recommended range.    When you cook, don t add much salt. If you can cook without using salt, even better. Don t add salt to your food at the table.  When shopping, read food labels. Salt is often called sodium on the label. Choose foods that are salt-free, low salt, or very low salt. Note that foods with reduced salt may not lower your salt intake enough.    Beans, potatoes, and pasta  Ok: Dry beans, split peas, lentils, potatoes, rice, macaroni, pasta, spaghetti without added salt  Avoid: Potato chips, tortilla chips, and similar products  Breads and cereals  Ok: Low-sodium breads, rolls, cereals, and cakes; low-salt crackers, matzo crackers  Avoid: Salted crackers, pretzels, popcorn, Azeri toast, pancakes, muffins  Dairy  Ok: Milk, chocolate milk, hot chocolate mix, low-salt cheeses, and yogurt  Avoid: Processed cheese and cheese spreads; Roquefort, Camembert, and cottage cheese; buttermilk, instant breakfast drink  Desserts  Ok: Ice cream, frozen yogurt, juice bars, gelatin, cookies and pies, sugar, honey, jelly, hard candy  Avoid: Most pies, cakes and cookies prepared or processed with salt; instant pudding  Drinks  Ok: Tea, coffee, fizzy (carbonated) drinks,  juices  Avoid: Flavored coffees, electrolyte replacement drinks, sports drinks  Meats  Ok: All fresh meat, fish, poultry, low-salt tuna, eggs, egg substitute  Avoid: Smoked, pickled, brine-cured, or salted meats and fish. This includes campos, chipped beef, corned beef, hot dogs, deli meats, ham, kosher meats, salt pork, sausage, canned tuna, salted codfish, smoked salmon, herring, sardines, or anchovies.  Seasonings and spices  Ok: Most seasonings are okay. Good substitutes for salt include: fresh herb blends, hot sauce, lemon, garlic, wood, vinegar, dry mustard, parsley, cilantro, horseradish, tomato paste, regular margarine, mayonnaise, unsalted butter, cream cheese, vegetable oil, cream, low-salt salad dressing and gravy.  Avoid: Regular ketchup, relishes, pickles, soy sauce, teriyaki sauce, Worcestershire sauce, BBQ sauce, tartar sauce, meat tenderizer, chili sauce, regular gravy, regular salad dressing, salted butter  Soups  Ok: Low-salt soups and broths made with allowed foods  Avoid: Bouillon cubes, soups with smoked or salted meats, regular soup and broth  Vegetables  Ok: Most vegetables are okay; also low-salt tomato and vegetable juices  Avoid: Sauerkraut and other brine-soaked vegetables; pickles and other pickled vegetables; tomato juice, olives  Date Last Reviewed: 8/1/2016 2000-2017 [employer]. [address] [place] [address]. All rights reserved. This information is not intended as a substitute for professional medical care. Always follow your healthcare professional's instructions.        Kidney Disease: Avoiding High-Sodium Foods  Sodium is a mineral that the body needs in small amounts. Sodium is found in table salt. Table salt is sodium chloride. Most people eat far more salt than they need. There are 2 main reasons for this. Salt is present in high amounts in most processed foods (pre-prepared foods like breakfast cereals, cookies, and pickles) and in all restaurant  foods. In other words, if you are not cooking from fresh ingredients at home, you are very likely eating more salt than you need. When sodium intake is too high, it can increase thirst and cause the body to retain fluid. This can increase blood pressure and strain the kidneys. If you have chronic kidney disease, try not to eat the foods listed here, unless the label states that they are made without salt. People with chronic kidney disease should restrict their sodium intake to less than 1,500 mg of sodium (3,800 mg of table salt) each day.      Canned and processed foods, such as gravies, instant cereal, packaged noodles and potato mixes, olives, pickles, soups, vegetables    Cheeses, such as American, Blue, Parmesan, Roquefort    Cured meats, such as campos, beef jerky, bologna, corned beef, ham, hot dogs, sandwich meats, sausages    Fast foods, such as burritos, fish sandwiches, milk shakes, salted French fries, tacos    Frozen foods, such as meat pies, TV dinners, waffles    Salted snacks, such as chips, crackers, peanut popcorn, pretzels, and nuts    Other packaged items, such as antacids, baking soda, bouillon, catsup, lite salt, relish, salted butter and margarine, soy and teriyaki sauce, steak sauce, vegetable juices  Date Last Reviewed: 2/1/2017 2000-2017 [employer]. [address] [place] [address]. All rights reserved. This information is not intended as a substitute for professional medical care. Always follow your healthcare professional's instructions.        Using Herbs and Spices    Herbs and spices add flavor to cooking without adding fat or sodium. That's why they're great for healthy cooking. Try these tips to help create tasty, healthy meals.  How much to use?  If a recipe calls for: 1 tablespoon of fresh herbs.you can use: 1 teaspoon of dried herbs, Or: 1/4 teaspoon of powdered herbs.   Tips for getting the most of herbs and spices    Use kitchen aron or a sharp knife  to cut leaves of fresh herbs. Cutting the leaves finely will release the most flavor.    When using whole spices, don't grind them until you need them. Crushing the berries and seeds with a mortar and pestle or grating whole nutmeg or cinnamon sticks just before adding them to your recipe will guarantee the most flavor.    Dried herbs pack more flavor for the same quantity than fresh herbs, and powdered herbs are more potent than dried flakes. If you are using powdered herbs in a recipe that calls for fresh, you'll want to decrease the amount you add.    When adding fresh or dried spices and herbs to cold recipes, such as dips or salad dressings, allow the food to sit in the refrigerator for at least a couple of hours before serving so the flavors can blend.    Add fresh spices and herbs to hot dishes as close to serving time as possible for the most flavorful results. Dried herbs and spices should be added early in the cooking process to prevent a powdery taste.    The longer dried herbs and spices sit in your cupboard without being used, the more flavor they lose. Whole spices last longer than ground or powdered spices. Store dried herbs and spices in a cool, dry, dark place. Keep powdered herbs and spices for no longer than a year.  Date Last Reviewed: 6/1/2017 2000-2017 [employer]. [address] [place], [place] [id number]. All rights reserved. This information is not intended as a substitute for professional medical care. Always follow your healthcare professional's instructions.        Metabolic Syndrome: Lowering Your Blood Pressure    Metabolic syndrome is a set of five health factors that can lead to serious health problems. The factors greatly increase your risk for diabetes, heart attack, or stroke. High blood pressure (hypertension) is one of the factors of metabolic syndrome.  What is high blood pressure?  High blood pressure is when the force of blood against the inside of your blood  vessels is higher than it should be. This makes your heart work harder. And it may damage your blood vessels. High blood pressure means a level of 130/80 mm Hg or more.  Your healthcare provider will usually check your blood pressure each time you have an office visit. Having a high reading at one office visit does not mean that you have high blood pressure. High blood pressure means that your blood pressure is high over a period of time. That is why your healthcare provider checks it at each office visit. He or she can then look at the pattern of your blood pressure. Try to arrive early enough to your appointment so you can rest before your blood pressure is taken. Avoid caffeine and smoking before your pressure is measured.   Making lifestyle changes  Lifestyle changes can help lower your blood pressure. These changes can include:    Losing weight. Even a small amount of weight loss can lower your blood pressure. As you slowly lose weight, you may see your blood pressure gradually get lower.    Quitting smoking. The nicotine in tobacco raises blood pressure. Smoking also increases the risk for other heart and blood vessel diseases, many cancers, and most lung conditions. The first step is to set a quit date. Talk with your healthcare provider about ways to quit smoking. There are programs and medicines that can help.    Eating healthy. Eat plenty of fruits and vegetables. Eat fat-free or low-fat dairy foods, and drink less alcohol.    Eating less salt (sodium). Salt can raise blood pressure. Try salt-free seasoning, or herbs and spices. Choose low-salt or no-salt snacks, deli meats, and canned foods.    Being more physically active. Physical activity can help lower blood pressure. Get at least 30 minutes of activity on most days. If you are not very active, talk with your healthcare provider before you get started. He or she can help you figure out what is best for you.    Managing stress. Stress can raise blood  pressure. Talk with your healthcare provider about lowering your stress level. He or she may advise relaxation methods, a counselor, health , or class.  Taking medicine  Your healthcare provider may also prescribe medicine to help lower your blood pressure. The medicine will help lessen the strain on your heart and help to protect your blood vessels. If you lose weight, you may be able to take less medicine. Or you may no longer need to take it.  Date Last Reviewed: 7/1/2016 2000-2017 [employer]. [address] [place] [address]. All rights reserved. This information is not intended as a substitute for professional medical care. Always follow your healthcare professional's instructions.        Tips for Using Less Salt    Most people with heart problems need to eat less salt (sodium). Reducing the amount of salt you eat may help control your blood pressure. The higher your blood pressure, the greater your risk for heart disease, stroke, blindness, and kidney problems.  At the store    Make low-salt choices by reading labels carefully. Look for the total amount of sodium per serving.    Use more fresh food. Buy more fruits and vegetables. Select lean meats, fish, and poultry.    Use fewer frozen, canned, and packaged foods which often contain a lot of sodium.    Use plain frozen vegetables without sauces or toppings. These products are often low-sodium or no-sodium.    Choose reduced-sodium or no-salt-added versions of canned vegetables and soups.  In the kitchen    Don't add salt to food when you're cooking. Season with flavorings such as onion, garlic, pepper, salt-free herbal blends, and lemon or lime juice.    Use a cookbook containing low-salt recipes. It can give you ideas for tasty meals that are healthy for your heart.    Sprinkle salt-free herbal blends on vegetables and meat.    Drain and rinse canned foods, such as canned beans and vegetables, before cooking or  eating.  Eating out    Tell the  you're on a low-salt diet. Ask questions about the menu.    Order fish, chicken, and meat broiled, baked, poached, or grilled without salt, butter, or breading.    Use lemon, pepper, and salt-free herb mixes to add flavor.    Choose plain steamed rice, boiled noodles, and baked or boiled potatoes. Top potatoes with chives and a little sour cream.     Beware! Salt goes by many other names. Limit foods with these words listed as ingredients: salt, sodium, soy sauce, baking soda, baking powder, MSG, monosodium, Na (the chemical symbol for sodium). Some antacids are also high in salt.   Date Last Reviewed: 6/1/2017 2000-2017 [employer]. [address] [place] [address]. All rights reserved. This information is not intended as a substitute for professional medical care. Always follow your healthcare professional's instructions.

## 2018-10-03 NOTE — PROGRESS NOTES
SUBJECTIVE:   Bev Araujo is a 18 year old female who presents to clinic today for the following health issues    Elevated Blood Pressure    Patient was referred by  for follow-up on recent multiple elevated blood pressures.    Patient denies previous history of hypertension states that her blood pressure readings in this clinic are always high, while at other locations and outside, her blood pressure readings are within normal range.   Denies chest pain, SOB, edema legs, visual concerns, focal neurological symptoms, dizziness, syncope, palpitations.  Has family history of hypertension in mother obesity and multiple family members, type 2 diabetes in brother.  Patient has been morbidly obese for as long as she knows  She is not trying to lose weight.  Patient finds it harder at home with multiple family members in terms of cooking and eating healthy  She tries to walk 4-5  times a week  Patient denies history of smoking, does not drink alcohol.  Patient does not have a history of diabetes though she had elevated blood sugars in the past      Low Salt Diet: low salt    Amount of exercise or physical activity: 4-5 days/week for an average of 15-30 minutes    Diet: regular (no restrictions)            Problem list and histories reviewed & adjusted, as indicated.  Additional history: as documented    Patient Active Problem List   Diagnosis     Morbid obesity (H)     Attention deficit disorder     Left-sided low back pain with left-sided sciatica, unspecified chronicity     Elevated BP without diagnosis of hypertension     Elevated fasting blood sugar     Morbid obesity with BMI of 45.0-49.9, adult (H)     History reviewed. No pertinent surgical history.    Social History   Substance Use Topics     Smoking status: Passive Smoke Exposure - Never Smoker     Smokeless tobacco: Never Used      Comment: Dad smokes outside     Alcohol use Yes      Comment: rare     Family History   Problem  Relation Age of Onset     Hypertension Mother      Diabetes Maternal Grandfather      Diabetes Paternal Grandmother      Hypertension Paternal Grandmother      Diabetes Brother      Other Cancer Maternal Grandmother      ovarian     Cancer Maternal Grandmother          Current Outpatient Prescriptions   Medication Sig Dispense Refill     naproxen 500 MG TBEC Take 500 mg by mouth as needed        norethindrone (MICRONOR) 0.35 MG per tablet Take 1 tablet (0.35 mg) by mouth daily 84 tablet 3     tiZANidine (ZANAFLEX) 4 MG tablet Take 4 mg by mouth At Bedtime        norethindrone (MICRONOR) 0.35 MG per tablet Take 1 tablet (0.35 mg) by mouth daily (Patient not taking: Reported on 10/3/2018) 84 tablet 3     No Known Allergies  Recent Labs   Lab Test  10/03/18   1530  11/06/17   1104   A1C  6.5*   --    LDL   --   88   HDL   --   45*   TRIG   --   164*   ALT  80*   --    CR  0.55   --    GFRESTIMATED  >90   --    GFRESTBLACK  >90   --    POTASSIUM  4.1   --    TSH  1.32  1.27      BP Readings from Last 3 Encounters:   10/03/18 138/85   10/03/18 (!) 149/103   04/23/18 120/78    Wt Readings from Last 3 Encounters:   10/03/18 325 lb (147.4 kg)   10/03/18 325 lb (147.4 kg)   04/23/18 323 lb 6.4 oz (146.7 kg)                  Labs reviewed in EPIC    Reviewed and updated as needed this visit by clinical staff       Reviewed and updated as needed this visit by Provider         ROS:  CONSTITUTIONAL:weight gain  INTEGUMENTARY/SKIN: NEGATIVE for worrisome rashes, moles or lesions  EYES: NEGATIVE for vision changes or irritation  RESP: NEGATIVE for significant cough or SOB  CV: NEGATIVE for chest pain, palpitations or peripheral edema  CV: as above  GI: NEGATIVE for nausea, abdominal pain, heartburn, or change in bowel habits  MUSCULOSKELETAL: NEGATIVE for significant arthralgias or myalgia  NEURO: NEGATIVE for weakness, dizziness or paresthesias  ENDOCRINE: NEGATIVE for temperature intolerance, skin/hair  "changes  HEME/ALLERGY/IMMUNE: NEGATIVE for bleeding problems  PSYCHIATRIC: NEGATIVE for changes in mood or affect    OBJECTIVE:     /85 (BP Location: Right arm, Patient Position: Sitting, Cuff Size: Adult Large)  Pulse 82  Temp 98.8  F (37.1  C) (Oral)  Ht 5' 8\" (1.727 m)  Wt 325 lb (147.4 kg)  LMP 09/24/2018  SpO2 96%  BMI 49.42 kg/m2  Body mass index is 49.42 kg/(m^2).  GENERAL: healthy, alert and no distress  NECK: no adenopathy, no asymmetry, masses, or scars and thyroid normal to palpation  RESP: lungs clear to auscultation - no rales, rhonchi or wheezes  CV: regular rate and rhythm, normal S1 S2, no S3 or S4, no murmur, click or rub, no peripheral edema and peripheral pulses strong  ABDOMEN: soft, nontender, no hepatosplenomegaly, no masses and bowel sounds normal  MS: no gross musculoskeletal defects noted, no edema  SKIN: no suspicious lesions or rashes  NEURO: Normal strength and tone, mentation intact and speech normal  PSYCH: mentation appears normal, affect normal/bright    Diagnostic Test Results:  Results for orders placed or performed in visit on 10/03/18 (from the past 24 hour(s))   Hemoglobin A1c   Result Value Ref Range    Hemoglobin A1C 6.5 (H) 0 - 5.6 %   CBC with platelets and differential   Result Value Ref Range    WBC 10.8 4.0 - 11.0 10e9/L    RBC Count 4.52 3.8 - 5.2 10e12/L    Hemoglobin 13.3 11.7 - 15.7 g/dL    Hematocrit 39.9 35.0 - 47.0 %    MCV 88 78 - 100 fl    MCH 29.4 26.5 - 33.0 pg    MCHC 33.3 31.5 - 36.5 g/dL    RDW 12.4 10.0 - 15.0 %    Platelet Count 359 150 - 450 10e9/L    % Neutrophils 53.8 %    % Lymphocytes 37.8 %    % Monocytes 5.9 %    % Eosinophils 1.0 %    % Basophils 0.8 %    % Immature Granulocytes 0.7 %    Absolute Neutrophil 5.8 1.6 - 8.3 10e9/L    Absolute Lymphocytes 4.1 0.8 - 5.3 10e9/L    Absolute Monocytes 0.6 0.0 - 1.3 10e9/L    Absolute Eosinophils 0.1 0.0 - 0.7 10e9/L    Absolute Basophils 0.1 0.0 - 0.2 10e9/L    Abs Immature Granulocytes 0.1 0 - " 0.4 10e9/L    Diff Method Automated Method    Comprehensive metabolic panel   Result Value Ref Range    Sodium 142 133 - 144 mmol/L    Potassium 4.1 3.4 - 5.3 mmol/L    Chloride 110 (H) 94 - 109 mmol/L    Carbon Dioxide 25 20 - 32 mmol/L    Anion Gap 7 3 - 14 mmol/L    Glucose 132 (H) 70 - 99 mg/dL    Urea Nitrogen 13 7 - 30 mg/dL    Creatinine 0.55 0.52 - 1.04 mg/dL    GFR Estimate >90 >60 mL/min/1.7m2    GFR Estimate If Black >90 >60 mL/min/1.7m2    Calcium 8.8 8.5 - 10.1 mg/dL    Bilirubin Total 0.2 0.2 - 1.3 mg/dL    Albumin 3.3 (L) 3.4 - 5.0 g/dL    Protein Total 7.0 6.8 - 8.8 g/dL    Alkaline Phosphatase 67 40 - 150 U/L    ALT 80 (H) 0 - 50 U/L    AST 39 0 - 45 U/L   TSH with free T4 reflex   Result Value Ref Range    TSH 1.32 0.40 - 4.00 mU/L   Albumin Random Urine Quantitative with Creat Ratio   Result Value Ref Range    Creatinine Urine 59 mg/dL    Albumin Urine mg/L 32 mg/L    Albumin Urine mg/g Cr 53.70 (H) 0 - 25 mg/g Cr       ASSESSMENT/PLAN:             1. Elevated BP without diagnosis of hypertension  BP Readings from Last 6 Encounters:   10/03/18 138/85   10/03/18 (!) 149/103   04/23/18 120/78   01/10/18 128/78   11/06/17 (!) 158/93   11/06/17 129/85     Reviewed elevated blood pressures.    Recommended labs for initial evaluation including microalbumin which showed microalbuminuria likely from ongoing hypertension that was untreated  Recommended patient to start taking blood pressure 1-2 times daily, follow-up with provided along with the blood pressure log in 2 weeks  We will consider antihypertensive treatment based on the numbers at that time  If numbers are normal, consider ambulatory 24 hours blood pressure monitoring  Will follow low salt diet, weight loss and regular exercises.  Gave education handouts on low salt diet to patient  Emphasized on lifestyle changes and weight loss for nonmedical management of hypertension given her early age  Patient verbalised understanding and is agreeable to  the plan.    - Hemoglobin A1c  - CBC with platelets and differential  - Comprehensive metabolic panel  - Albumin Random Urine Quantitative with Creat Ratio  - TSH with free T4 reflex    2. Elevated fasting blood sugar  Glucose   Date Value Ref Range Status   10/03/2018 132 (H) 70 - 99 mg/dL Final     Comment:     Non Fasting   11/06/2017 120 (H) 70 - 99 mg/dL Final     Comment:     Fasting specimen     Lab Results   Component Value Date    A1C 6.5 10/03/2018     Consistent with prediabetes, consider dietitian consult, patient is reluctant due to having large previous bills unpaid and not knowing what is covered.  She will check with her insurance before the next visit in 2 weeks    - Hemoglobin A1c  - Comprehensive metabolic panel    3. Morbid obesity with BMI of 45.0-49.9, adult (H)  Wt Readings from Last 5 Encounters:   10/03/18 325 lb (147.4 kg)   10/03/18 325 lb (147.4 kg)   04/23/18 323 lb 6.4 oz (146.7 kg)   01/10/18 (!) 318 lb (144.2 kg) (>99 %)*   11/06/17 (!) 323 lb 3.2 oz (146.6 kg) (>99 %)*     * Growth percentiles are based on CDC 2-20 Years data.     Emphasized on weight loss, portion control, low calorie and low fat diet, healthy eating, regular exercises. Offered dietary consult, declined. Encouraged to enroll in a weight loss program for tracking on meal planning and weight.        Chart documentation done in part with Dragon Voice recognition Software. Although reviewed after completion, some word and grammatical error may remain.    See Patient Instructions    Dixie Fisher MD  [facility]

## 2018-10-03 NOTE — PROGRESS NOTES
"Bev is a 18 year old female, , who is here for her annual exam and refill of the BCP.  Due to her elevated BP readings here, she was switched last year to a progesterone-only BCP (Micronor).  She denies any current headache or visual changes.  She is sexually active with one partner so consents to the chlamydia screen but declines other tests.      ROS: Ten point review of systems was reviewed and negative except the above.    Health Maintenance   Topic Date Due     HIV SCREEN (SYSTEM ASSIGNED)  2016     INFLUENZA VACCINE (1) 2018     PHQ-2 Q1 YR  2018     EYE EXAM Q1 YEAR  2019     TETANUS IMMUNIZATION (SYSTEM ASSIGNED)  2019     PEDS DTAP/TDAP (7 - Td) 2019     HPV IMMUNIZATION  Completed      Last pap: too young  Last Mammogram: not applicable  Last Dexa: not applicable  Last Colonoscopy: not applicable  Lab Results   Component Value Date    CHOL 166 2017     Lab Results   Component Value Date    HDL 45 2017     Lab Results   Component Value Date    LDL 88 2017     Lab Results   Component Value Date    TRIG 164 2017     No results found for: CHOLHDLRATIO      OBHX:      PSH: History reviewed. No pertinent surgical history.      PMH: Her past medical, surgical, and obstetric histories were reviewed and are documented in their appropriate chart areas.    ALL/Meds: Her medication and allergy histories were reviewed and are documented in their appropriate chart areas.    SH/FMH: Her social and family history was reviewed and documented in its appropriate chart area.    PE: BP (!) 149/103  Pulse 77  Ht 5' 8\" (1.727 m)  Wt 325 lb (147.4 kg)  LMP 2018  SpO2 97%  Breastfeeding? No  BMI 49.42 kg/m2  Body mass index is 49.42 kg/(m^2).    General Appearance:  healthy, alert, active, no distress  Cardiovascular:  Regular rate and Rhythm without murmur  Neck: Supple, no adenopathy and thyroid normal  Lungs:  Clear, without wheeze, rale or " rhonchi  Breast: normal breast exam  Abdomen: Benign, Soft, flat, non-tender, No masses, organomegaly, No inguinal nodes and Bowel sounds normoactive.   Pelvic:       - Ext: Vulva and perineum are normal without lesion, mass or discharge        - Urethra: normal without discharge        - Urethral Meatus: normal appearance       - Bladder: no tenderness, no masses       - Vagina: Normal mucosa, no discharge        - Cervix: normal to palpation.  She could not tolerate a speculum exam so I was unable to visualize her cervix.  Therefore, will check a chlamydia test via the urine.       - Uterus:Normal shape, position and consistency        - Adnexa: Normal without masses or tenderness but exam is technically difficult due to body habitus       - Rectal: deferred    A/P:  Well Woman Exam, BCP refill, and elevated BP readings     -  I discussed the new pap recommendations regarding screening.  Explained the rationale for increased intervals between paps.  Questions asked and answered.  She does agree to this regiment.  Pap was obtained and submitted   -  BC: Micronor refilled with instructions discussed.  She is not a candidate for a combined BCP due to her elevated BP readings   -  No orders of the defined types were placed in this encounter.   -  Will submit a chlamydia urine test due to the patient's intolerance of a speculum   -  She was referred to FP to evaluate elevated BP readings.   -  Encouraged self-breast exam   -  Encouraged low fat diet, regular exercise, and adequate calcium intake.    Linnea Mars DO  FACOG, FACS

## 2018-10-03 NOTE — MR AVS SNAPSHOT
After Visit Summary   10/3/2018    Bev Araujo    MRN: [id number]           Patient Information     Date Of Birth          2000        Visit Information        Provider Department      10/3/2018 11:00 AM Linnea Mars DO [facility]        Care Instructions                                                         If you have any questions regarding your visit, Please contact your care team.    [facility] CLINIC HOURS TELEPHONE NUMBER   Linnea Mars DO.    LIBERTAD Heller -    DESTIN Alexander       Monday, Wednesday, [facility] and [facility]  8:30a.m-5:00 p.m   [facility]  [address] [place] [address]  [phone] ask for [facility]    Imaging Gkyuwfjkic-[phone]       Urgent Care locations:    [facility] Saturday and Sunday   9 am - 5 pm    Monday-Friday   12 pm - 8 pm  Saturday and Sunday   9 am - 5 pm   [phone]     [facility] Labor and Delivery:  [phone]    If you need a medication refill, please contact your pharmacy. Please allow 3 business days for your refill to be completed.  As always, Thank you for trusting us with your healthcare needs!                Follow-ups after your visit        Who to contact     If you have questions or need follow up information about today's clinic visit or your schedule please contact [facility] directly at [phone].  Normal or non-critical lab and imaging results will be communicated to you by MyChart, letter or phone within 4 business days after the clinic has received the results. If you do not hear from us within 7 days, please contact the clinic through MyChart or phone. If you have a critical or abnormal lab result, we will notify you by phone as soon as possible.  Submit refill requests through [employer] or call your pharmacy and they will forward the refill  "request to us. Please allow 3 business days for your refill to be completed.          Additional Information About Your Visit        [employer] Information     [employer] gives you secure access to your electronic health record. If you see a primary care provider, you can also send messages to your care team and make appointments. If you have questions, please call your primary care clinic.  If you do not have a primary care provider, please call [phone] and they will assist you.        Care EveryWhere ID     This is your Care EveryWhere ID. This could be used by other organizations to access your [facility] medical records  [id number]        Your Vitals Were     Pulse Height Last Period Pulse Oximetry Breastfeeding? BMI (Body Mass Index)    77 5' 8\" (1.727 m) 09/24/2018 97% No 49.42 kg/m2       Blood Pressure from Last 3 Encounters:   10/03/18 (!) 149/103   04/23/18 120/78   01/10/18 128/78    Weight from Last 3 Encounters:   10/03/18 325 lb (147.4 kg)   04/23/18 323 lb 6.4 oz (146.7 kg)   01/10/18 (!) 318 lb (144.2 kg) (>99 %)*     * Growth percentiles are based on CDC 2-20 Years data.              Today, you had the following     No orders found for display         Today's Medication Changes          These changes are accurate as of 10/3/18 11:22 AM.  If you have any questions, ask your nurse or doctor.               Stop taking these medicines if you haven't already. Please contact your care team if you have questions.     cyclobenzaprine 10 MG tablet   Commonly known as:  FLEXERIL   Stopped by:  Linnea Mars DO                    Primary Care Provider Office Phone # Fax #    Linnea Mars [phone]       [address]  [facility] [address]        Equal Access to Services     [facility] MARIBEL : Sanna rondono Sotung, waaxda luqadaha, qaybta kaalmada kiyayada, costa fitzpatrick. So [facility] [phone].    ATENCIÓN: Si habla español, tiene a mcfarland " disposición servicios gratuitos de asistencia lingüística. Hawk arreaga [phone].    We comply with applicable federal civil rights laws and [place] laws. We do not discriminate on the basis of race, color, national origin, age, disability, sex, sexual orientation, or gender identity.            Thank you!     Thank you for choosing [facility]  for your care. Our goal is always to provide you with excellent care. Hearing back from our patients is one way we can continue to improve our services. Please take a few minutes to complete the written survey that you may receive in the mail after your visit with us. Thank you!             Your Updated Medication List - Protect others around you: Learn how to safely use, store and throw away your medicines at www.disposemymeds.org.          This list is accurate as of 10/3/18 11:22 AM.  Always use your most recent med list.                   Brand Name Dispense Instructions for use Diagnosis    naproxen 500 MG Tbec      Take 500 mg by mouth        norethindrone 0.35 MG per tablet    MICRONOR    84 tablet    Take 1 tablet (0.35 mg) by mouth daily    General counseling for prescription of oral contraceptives       tiZANidine 4 MG tablet    ZANAFLEX     Take 4 mg by mouth

## 2018-10-04 ENCOUNTER — TELEPHONE (OUTPATIENT)
Dept: OBGYN | Facility: CLINIC | Age: 18
End: 2018-10-04

## 2018-10-04 DIAGNOSIS — Z11.3 SCREEN FOR STD (SEXUALLY TRANSMITTED DISEASE): Primary | ICD-10-CM

## 2018-10-04 DIAGNOSIS — Z11.3 SCREEN FOR STD (SEXUALLY TRANSMITTED DISEASE): ICD-10-CM

## 2018-10-04 PROCEDURE — 87491 CHLMYD TRACH DNA AMP PROBE: CPT | Performed by: OBSTETRICS & GYNECOLOGY

## 2018-10-04 ASSESSMENT — PATIENT HEALTH QUESTIONNAIRE - PHQ9: SUM OF ALL RESPONSES TO PHQ QUESTIONS 1-9: 2

## 2018-10-04 ASSESSMENT — ANXIETY QUESTIONNAIRES: GAD7 TOTAL SCORE: 4

## 2018-10-04 NOTE — TELEPHONE ENCOUNTER
Patient had a urine chlamydia during yesterdays office visit.  Lab is calling because the specimen leaked in transit and had to be cancelled.  Please advise.  Pina Asher CMA  October 4, 2018 7:58 AM

## 2018-10-04 NOTE — TELEPHONE ENCOUNTER
Please call patient and advise that she return to lab for a repeat chlamydia test via urine since yesterday's specimen leaked and and tossed per the laboratory. (Routing comment)

## 2018-10-05 LAB
C TRACH DNA SPEC QL NAA+PROBE: NEGATIVE
SPECIMEN SOURCE: NORMAL

## 2018-10-17 ENCOUNTER — OFFICE VISIT (OUTPATIENT)
Dept: PEDIATRICS | Facility: CLINIC | Age: 18
End: 2018-10-17
Payer: COMMERCIAL

## 2018-10-17 VITALS
OXYGEN SATURATION: 98 % | BODY MASS INDEX: 49.57 KG/M2 | HEART RATE: 70 BPM | WEIGHT: 293 LBS | TEMPERATURE: 98.5 F | DIASTOLIC BLOOD PRESSURE: 90 MMHG | SYSTOLIC BLOOD PRESSURE: 138 MMHG

## 2018-10-17 DIAGNOSIS — R73.03 PREDIABETES: ICD-10-CM

## 2018-10-17 DIAGNOSIS — R73.01 ELEVATED FASTING BLOOD SUGAR: ICD-10-CM

## 2018-10-17 DIAGNOSIS — E66.01 MORBID OBESITY WITH BMI OF 45.0-49.9, ADULT (H): ICD-10-CM

## 2018-10-17 DIAGNOSIS — R03.0 ELEVATED BP WITHOUT DIAGNOSIS OF HYPERTENSION: Primary | ICD-10-CM

## 2018-10-17 DIAGNOSIS — R80.9 MICROALBUMINURIA: ICD-10-CM

## 2018-10-17 DIAGNOSIS — R51.9 NONINTRACTABLE EPISODIC HEADACHE, UNSPECIFIED HEADACHE TYPE: ICD-10-CM

## 2018-10-17 PROCEDURE — 99214 OFFICE O/P EST MOD 30 MIN: CPT | Performed by: FAMILY MEDICINE

## 2018-10-17 ASSESSMENT — PAIN SCALES - GENERAL: PAINLEVEL: NO PAIN (0)

## 2018-10-17 NOTE — PROGRESS NOTES
SUBJECTIVE:   Bev Araujo is a 18 year old female who presents to clinic today for the following health issues:    Elevated Blood Pressure Follow-up    Patient who was seen by me 2 weeks ago for elevated blood pressure is here today for recheck.  Patient states she has been checking blood pressures at home which are all within normal range.  She is also here for follow-up on her significantly abnormal lab results from her last visit.  Patient also has a mild global headache today which she thinks is from her stress on her recent abnormal labs and new diagnosis.  Patient states after she saw her results through my chart, she has been crying and worried and feels helpless at home given multiple family members being morbidly obese but on healthy lifestyle including on healthy eating pattern.  Patient currently works and goes to school  She is currently on nonestrogen hormonal contraception   Denies chest pain, SOB, edema legs, visual concerns, focal neurological symptoms, dizziness, syncope, palpitations.   Deneis polyuria, polydipsia, and polyphagia.      Patient reports also having a headache today.      Outpatient blood pressures are being checked at home.  Results are 125/74.    Low Salt Diet: low salt      Amount of exercise or physical activity: 4-5 days/week for an average of 15-30 minutes    Diet: regular (no restrictions)             Problem list and histories reviewed & adjusted, as indicated.  Additional history: as documented    Patient Active Problem List   Diagnosis     Morbid obesity (H)     Attention deficit disorder     Left-sided low back pain with left-sided sciatica, unspecified chronicity     Elevated BP without diagnosis of hypertension     Elevated fasting blood sugar     Morbid obesity with BMI of 45.0-49.9, adult (H)     History reviewed. No pertinent surgical history.    Social History   Substance Use Topics     Smoking status: Passive Smoke Exposure - Never Smoker      Smokeless tobacco: Never Used      Comment: Dad smokes outside     Alcohol use Yes      Comment: rare     Family History   Problem Relation Age of Onset     Hypertension Mother      Diabetes Maternal Grandfather      Diabetes Paternal Grandmother      Hypertension Paternal Grandmother      Diabetes Brother      Other Cancer Maternal Grandmother      ovarian     Cancer Maternal Grandmother          Current Outpatient Prescriptions   Medication Sig Dispense Refill     naproxen 500 MG TBEC Take 500 mg by mouth as needed        norethindrone (MICRONOR) 0.35 MG per tablet Take 1 tablet (0.35 mg) by mouth daily 84 tablet 3     tiZANidine (ZANAFLEX) 4 MG tablet Take 4 mg by mouth At Bedtime        norethindrone (MICRONOR) 0.35 MG per tablet Take 1 tablet (0.35 mg) by mouth daily (Patient not taking: Reported on 10/3/2018) 84 tablet 3     No Known Allergies  Recent Labs   Lab Test  10/03/18   1530  11/06/17   1104   A1C  6.5*   --    LDL   --   88   HDL   --   45*   TRIG   --   164*   ALT  80*   --    CR  0.55   --    GFRESTIMATED  >90   --    GFRESTBLACK  >90   --    POTASSIUM  4.1   --    TSH  1.32  1.27      BP Readings from Last 3 Encounters:   10/17/18 138/90   10/03/18 138/85   10/03/18 (!) 149/103    Wt Readings from Last 3 Encounters:   10/17/18 326 lb (147.9 kg)   10/03/18 325 lb (147.4 kg)   10/03/18 325 lb (147.4 kg)                  Labs reviewed in EPIC    Reviewed and updated as needed this visit by clinical staff  Tobacco  Allergies  Meds  Med Hx  Surg Hx  Fam Hx  Soc Hx      Reviewed and updated as needed this visit by Provider         ROS:  CONSTITUTIONAL: Morbid obesity  EYES: NEGATIVE for vision changes or irritation  RESP: NEGATIVE for significant cough or SOB  CV: NEGATIVE for chest pain, palpitations or peripheral edema  CV: as above  GI: NEGATIVE for nausea, abdominal pain, heartburn, or change in bowel habits  MUSCULOSKELETAL: NEGATIVE for significant arthralgias or  myalgia  Neuro-headache  ENDOCRINE: NEGATIVE for temperature intolerance, skin/hair changes and new diagnosis of early type 2 diabetes  PSYCHIATRIC: NEGATIVE for changes in mood or affect    OBJECTIVE:     /90 (BP Location: Right arm, Patient Position: Sitting, Cuff Size: Adult Large)  Pulse 70  Temp 98.5  F (36.9  C) (Oral)  Wt 326 lb (147.9 kg)  LMP 09/24/2018  SpO2 98%  BMI 49.57 kg/m2  Body mass index is 49.57 kg/(m^2).  GENERAL: healthy, alert and no distress  RESP: lungs clear to auscultation - no rales, rhonchi or wheezes  CV: regular rate and rhythm, normal S1 S2, no S3 or S4, no murmur, click or rub, no peripheral edema and peripheral pulses strong  MS: no gross musculoskeletal defects noted, no edema  NEURO: Normal strength and tone, mentation intact and speech normal  PSYCH: mentation appears normal, tearful and anxious    Diagnostic Test Results:  none     ASSESSMENT/PLAN:         1. Elevated BP without diagnosis of hypertension  BP Readings from Last 6 Encounters:   10/17/18 138/90   10/03/18 138/85   10/03/18 (!) 149/103   04/23/18 120/78   01/10/18 128/78   11/06/17 (!) 158/93     Reviewed elevated blood pressure again, likely causing a headache.  Considered managing hypertension including medications versus lifestyle changes which includes low-salt diet, weight loss, regular exercises.  Patient is reluctant to take medications.   agreed with starting on lifestyle changes, will start consulting dietitian, follow portion control, start on regular exercises   follow-up for recheck in 3 months along with labs including rechecking microalbumin which is slightly elevated at this time due to possible ongoing early type 2 diabetes and hypertension  Patient verbalised understanding and is agreeable to the plan.    - NUTRITION REFERRAL    2. Nonintractable episodic headache, unspecified headache type  Likely from elevated blood pressure versus current stress secondary to her new diagnosis  Patient  will monitor her headache for now, if headache is persistent, we will follow-up in 1 week.  If headache is severe or constant with associated symptoms of dizziness or other focal neurological symptoms, nausea, vomiting, patient understands to go to the ER    3. Microalbuminuria  Lab Results   Component Value Date    MICROL 32 10/03/2018     No results found for: MICROALBUMIN  As above in problem #1    4. Prediabetes  Lab Results   Component Value Date    A1C 6.5 10/03/2018     Glucose   Date Value Ref Range Status   10/03/2018 132 (H) 70 - 99 mg/dL Final     Comment:     Non Fasting   11/06/2017 120 (H) 70 - 99 mg/dL Final     Comment:     Fasting specimen     Reviewed moderately elevated blood sugars and A1c of 6.5 consistent with early type 2 diabetes.  Patient was in tears throughout the visit today knowing her abnormal lab results.  Empathized with patient but also encouraged her to take this moment in her life to start doing major changes for better health including healthy eating, following a regular exercise program, consulting dietitian for dietary advice, portion control and weight loss.  Patient will follow-up with provider in 3 months for recheck along with fasting labs  Patient verbalised understanding and is agreeable to the plan.  We will consider checking for insulin resistance at the next visit.  - NUTRITION REFERRAL    5. Morbid obesity with BMI of 45.0-49.9, adult (H)  Wt Readings from Last 5 Encounters:   10/17/18 326 lb (147.9 kg)   10/03/18 325 lb (147.4 kg)   10/03/18 325 lb (147.4 kg)   04/23/18 323 lb 6.4 oz (146.7 kg)   01/10/18 (!) 318 lb (144.2 kg) (>99 %)*     * Growth percentiles are based on CDC 2-20 Years data.     Emphasized on weight loss, portion control, low calorie and low fat diet, healthy eating, regular exercises. Offered dietary consult,   . Encouraged to enroll in a weight loss program for tracking on meal planning and weight.    - NUTRITION REFERRAL    6. Elevated fasting  blood sugar  As above in problem #4  - NUTRITION REFERRAL    Work on weight loss  Regular exercise  Chart documentation done in part with Dragon Voice recognition Software. Although reviewed after completion, some word and grammatical error may remain.    See Patient Instructions    Dixie Fisher MD  [facility]

## 2018-10-17 NOTE — MR AVS SNAPSHOT
After Visit Summary   10/17/2018    Bev Araujo    MRN: [id number]           Patient Information     Date Of Birth          2000        Visit Information        Provider Department      10/17/2018 2:30 PM Dixie Fisher MD [facility]        Today's Diagnoses     Elevated BP without diagnosis of hypertension    -  1    Prediabetes        Morbid obesity with BMI of 45.0-49.9, adult (H)        Elevated fasting blood sugar          Care Instructions    Schedule for dietician consult  Schedule for fasting labs and recheck in 3 months          Follow-ups after your visit        Additional Services     NUTRITION REFERRAL       Your provider has referred you to: [facility]: [facility] [phone]   [email or web address]    Please be aware that coverage of these services is subject to the terms and limitations of your health insurance plan.  Call member services at your health plan with any benefit or coverage questions.      Please bring the following to your appointment:      >>   This referral request   >>   Any documents given to you for this referral  >>   Any specific questions you have about diet or food choices                  Who to contact     If you have questions or need follow up information about today's clinic visit or your schedule please contact [facility] directly at [phone].  Normal or non-critical lab and imaging results will be communicated to you by MyChart, letter or phone within 4 business days after the clinic has received the results. If you do not hear from us within 7 days, please contact the clinic through MyChart or phone. If you have a critical or abnormal lab result, we will notify you by phone as soon as possible.  Submit refill requests through [employer] or call your pharmacy and they will forward the refill request to us. Please allow 3 business days for  your refill to be completed.          Additional Information About Your Visit        [employer] Information     [employer] gives you secure access to your electronic health record. If you see a primary care provider, you can also send messages to your care team and make appointments. If you have questions, please call your primary care clinic.  If you do not have a primary care provider, please call [phone] and they will assist you.      [employer]n is an electronic gateway that provides easy, online access to your medical records. With [employer], you can request a clinic appointment, read your test results, renew a prescription or communicate with your care team.     To access your existing account, please contact your [facility] or call [phone] for assistance.        Care EveryWhere ID     This is your Care EveryWhere ID. This could be used by other organizations to access your [facility] medical records  [id number]        Your Vitals Were     Pulse Temperature Last Period Pulse Oximetry BMI (Body Mass Index)       70 98.5  F (36.9  C) (Oral) 09/24/2018 98% 49.57 kg/m2        Blood Pressure from Last 3 Encounters:   10/17/18 138/90   10/03/18 138/85   10/03/18 (!) 149/103    Weight from Last 3 Encounters:   10/17/18 326 lb (147.9 kg)   10/03/18 325 lb (147.4 kg)   10/03/18 325 lb (147.4 kg)              We Performed the Following     NUTRITION REFERRAL        Primary Care Provider Office Phone # Fax #    Linnea Mars,  [phone]       [address]  [facility] [address]        Equal Access to Services     [facility] Hospital[facility] : Hadii aad ku hadasho Soomaali, waaxda luqadaha, qaybta kaalmada adeegyada, costa villa hayamanda pascal . So [facility] [phone].    ATENCIÓN: Si habla español, tiene a mcfarland disposición servicios gratuitos de asistencia lingüística. Llame al [phone].    We comply with applicable federal civil rights laws and [place] laws. We  do not discriminate on the basis of race, color, national origin, age, disability, sex, sexual orientation, or gender identity.            Thank you!     Thank you for choosing [facility]  for your care. Our goal is always to provide you with excellent care. Hearing back from our patients is one way we can continue to improve our services. Please take a few minutes to complete the written survey that you may receive in the mail after your visit with us. Thank you!             Your Updated Medication List - Protect others around you: Learn how to safely use, store and throw away your medicines at www.disposemymeds.org.          This list is accurate as of 10/17/18  3:02 PM.  Always use your most recent med list.                   Brand Name Dispense Instructions for use Diagnosis    naproxen 500 MG Tbec      Take 500 mg by mouth as needed        * norethindrone 0.35 MG per tablet    MICRONOR    84 tablet    Take 1 tablet (0.35 mg) by mouth daily    General counseling for prescription of oral contraceptives       * norethindrone 0.35 MG per tablet    MICRONOR    84 tablet    Take 1 tablet (0.35 mg) by mouth daily    General counseling for prescription of oral contraceptives       tiZANidine 4 MG tablet    ZANAFLEX     Take 4 mg by mouth At Bedtime        * Notice:  This list has 2 medication(s) that are the same as other medications prescribed for you. Read the directions carefully, and ask your doctor or other care provider to review them with you.

## 2019-01-25 ENCOUNTER — OFFICE VISIT (OUTPATIENT)
Dept: PEDIATRICS | Facility: CLINIC | Age: 19
End: 2019-01-25
Payer: COMMERCIAL

## 2019-01-25 VITALS
BODY MASS INDEX: 48.32 KG/M2 | OXYGEN SATURATION: 95 % | WEIGHT: 293 LBS | HEART RATE: 106 BPM | SYSTOLIC BLOOD PRESSURE: 130 MMHG | DIASTOLIC BLOOD PRESSURE: 91 MMHG | TEMPERATURE: 99.8 F

## 2019-01-25 DIAGNOSIS — R03.0 ELEVATED BP WITHOUT DIAGNOSIS OF HYPERTENSION: ICD-10-CM

## 2019-01-25 DIAGNOSIS — J01.90 ACUTE SINUSITIS WITH SYMPTOMS > 10 DAYS: Primary | ICD-10-CM

## 2019-01-25 PROCEDURE — 99214 OFFICE O/P EST MOD 30 MIN: CPT | Performed by: FAMILY MEDICINE

## 2019-01-25 RX ORDER — ONDANSETRON 4 MG/1
4 TABLET, ORALLY DISINTEGRATING ORAL PRN
COMMUNITY
Start: 2019-01-22 | End: 2019-06-20

## 2019-01-25 RX ORDER — AZITHROMYCIN 250 MG/1
TABLET, FILM COATED ORAL
Qty: 6 TABLET | Refills: 0 | Status: SHIPPED | OUTPATIENT
Start: 2019-01-25 | End: 2019-06-20

## 2019-01-25 NOTE — LETTER
January 25, 2019      Bev Posadasra  [address]  [facility] [address]            To whom it may concern;    Bev Whitaker Van is seen today in the clinic. Please excuse her from work from 1/26/19-1/27/19            Sincerely,      Dixie Fisher MD

## 2019-01-25 NOTE — PROGRESS NOTES
SUBJECTIVE:   Bev Araujo is a 18 year old female who presents to clinic today for the following health issues:    ED/UC Followup:  Patient with past medical history significant for morbid obesity, elevated blood pressure with no previous history of hypertension, hyperglycemia is here for follow-up on her [facility] ER visits x2 in the past few days for URI symptoms.  Due to worsening cough, patient had a chest x-ray done at her last visit to the ER which was normal patient continues to have pressure and pain over the forehead and both cheeks, postnasal drainage, mild sore throat, ongoing moderately productive cough with a decreased appetite and low energy.    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  Patient has not received annual influenza vaccination yet.  Patient denies wheezing, chest pain, palpitations, dizziness  Does not smoke. Has no h/o asthnma or allergies.      Facility:  [facility]  Date of visit: 1/20/2019 and 1/22/2019  Reason for visit: sinusitis  Current Status: Patient still having headaches, bilateral head pressure, trouble sleeping, coughing, labored breathing, ear pain, low energy, and fevers not going down. Patient needing work note for this weekend. Patient works in nursing home.              Problem list and histories reviewed & adjusted, as indicated.  Additional history: as documented    Patient Active Problem List   Diagnosis     Morbid obesity (H)     Attention deficit disorder     Left-sided low back pain with left-sided sciatica, unspecified chronicity     Elevated BP without diagnosis of hypertension     Elevated fasting blood sugar     Morbid obesity with BMI of 45.0-49.9, adult (H)     History reviewed. No pertinent surgical history.    Social History     Tobacco Use     Smoking status: Passive Smoke Exposure - Never Smoker     Smokeless tobacco: Never Used     Tobacco comment:  Dad smokes outside   Substance Use Topics     Alcohol use: Yes     Comment: rare     Family History   Problem Relation Age of Onset     Hypertension Mother      Diabetes Maternal Grandfather      Diabetes Paternal Grandmother      Hypertension Paternal Grandmother      Diabetes Brother      Other Cancer Maternal Grandmother         ovarian     Cancer Maternal Grandmother          Current Outpatient Medications   Medication Sig Dispense Refill     amoxicillin-clavulanate (AUGMENTIN) 875-125 MG tablet Take 875 mg by mouth 2 times daily       azithromycin (ZITHROMAX) 250 MG tablet Two tablets first day, then one tablet daily for four days. 6 tablet 0     blood glucose (NO BRAND SPECIFIED) lancets standard Use to test blood sugar 2 times daily or as directed. 100 each 1     blood glucose monitoring (NO BRAND SPECIFIED) meter device kit Use to test blood sugar 2 times daily or as directed. 1 kit 0     blood glucose monitoring (NO BRAND SPECIFIED) test strip Use to test blood sugars 2 times daily or as directed 100 strip 1     naproxen 500 MG TBEC Take 500 mg by mouth as needed        norethindrone (MICRONOR) 0.35 MG per tablet Take 1 tablet (0.35 mg) by mouth daily 84 tablet 3     ondansetron (ZOFRAN ODT) 4 MG ODT tab Take 4 mg by mouth as needed       tiZANidine (ZANAFLEX) 4 MG tablet Take 4 mg by mouth At Bedtime        No Known Allergies  Recent Labs   Lab Test 10/03/18  1530 11/06/17  1104   A1C 6.5*  --    LDL  --  88   HDL  --  45*   TRIG  --  164*   ALT 80*  --    CR 0.55  --    GFRESTIMATED >90  --    GFRESTBLACK >90  --    POTASSIUM 4.1  --    TSH 1.32 1.27      BP Readings from Last 3 Encounters:   01/25/19 (!) 130/91   10/17/18 138/90   10/03/18 138/85    Wt Readings from Last 3 Encounters:   01/25/19 144.2 kg (317 lb 12.8 oz)   10/17/18 147.9 kg (326 lb)   10/03/18 147.4 kg (325 lb)                  Labs reviewed in EPIC    Reviewed and updated as needed this visit by clinical staff       Reviewed and updated  as needed this visit by Provider         ROS:  CONSTITUTIONAL:as above  INTEGUMENTARY/SKIN: NEGATIVE for worrisome rashes, moles or lesions  EYES: NEGATIVE for vision changes or irritation  ENT/MOUTH: as above  RESP: as above  CV: NEGATIVE for chest pain, palpitations or peripheral edema  GI: NEGATIVE for nausea, abdominal pain, heartburn, or change in bowel habits  MUSCULOSKELETAL: NEGATIVE for significant arthralgias or myalgia  NEURO: NEGATIVE for weakness, dizziness or paresthesias  ENDOCRINE: NEGATIVE for temperature intolerance, skin/hair changes, early type 2 diabetes  HEME/ALLERGY/IMMUNE: NEGATIVE for bleeding problems  PSYCHIATRIC: NEGATIVE for changes in mood or affect    OBJECTIVE:     BP (!) 130/91 (BP Location: Right arm, Patient Position: Sitting, Cuff Size: Adult Large)   Pulse 106   Temp 99.8  F (37.7  C) (Oral)   Wt 144.2 kg (317 lb 12.8 oz)   SpO2 95%   BMI 48.32 kg/m    Body mass index is 48.32 kg/m .  GENERAL: healthy, alert and no distress  EYES: Eyes grossly normal to inspection  HENT: normal cephalic/atraumatic, both ears: mucopurulent effusion, nose and mouth without ulcers or lesions, oropharynx clear, oral mucous membranes moist and sinuses: maxillary, frontal, ethmoid tenderness on both sides  NECK: no adenopathy, no asymmetry, masses, or scars and thyroid normal to palpation  RESP: lungs clear to auscultation - no rales, rhonchi or wheezes  CV: regular rate and rhythm, normal S1 S2, no S3 or S4, no murmur, click or rub, no peripheral edema and peripheral pulses strong  MS: no gross musculoskeletal defects noted, no edema  SKIN: no suspicious lesions or rashes  PSYCH: mentation appears normal, affect normal/bright    Diagnostic Test Results:  none     ASSESSMENT/PLAN:             1. Acute sinusitis with symptoms > 10 days  Reviewed documents and reports from care everywhere  Reviewed chest x-ray with no acute findings  Recommended to stop taking Augmentin since she did not see any  improvement in the last 5 days of taking it  Will start on azithromycin, recommended over-the-counter Mucinex cough syrup as needed for cough, take Tylenol as needed for pain and fever  Rest and push fluids  Work excuse letter given today to skip this weekend  Dosing and potential medication side effects discussed.  Recommended to push fluids, warm face compresses, rest, tylenol prn for pain, wet steam inhalation and RTC in 1week if no better by then or sooner prn.  Patient verbalised understanding and is agreeable to the plan.    - azithromycin (ZITHROMAX) 250 MG tablet; Two tablets first day, then one tablet daily for four days.  Dispense: 6 tablet; Refill: 0    2. Elevated BP without diagnosis of hypertension  BP Readings from Last 6 Encounters:   01/25/19 (!) 130/91   10/17/18 138/90   10/03/18 138/85   10/03/18 (!) 149/103   04/23/18 120/78   01/10/18 128/78 (91 %/ 90 %)*     *BP percentiles are based on the August 2017 AAP Clinical Practice Guideline for girls     Blood pressure was found to be elevated today which she had in the past.  Patient has not followed up with this writer as she was recommended at her last visit in October 2018  Once patient's acute symptoms resolve, she understands to follow-up in the office for recheck on her diabetes and high blood pressure  Will follow low salt diet, weight loss and regular exercises.        Chart documentation done in part with Dragon Voice recognition Software. Although reviewed after completion, some word and grammatical error may remain.    See Patient Instructions    Dixie Fisher MD  [facility]

## 2019-05-10 ENCOUNTER — OFFICE VISIT (OUTPATIENT)
Dept: OPTOMETRY | Facility: CLINIC | Age: 19
End: 2019-05-10
Payer: COMMERCIAL

## 2019-05-10 DIAGNOSIS — H52.13 MYOPIA OF BOTH EYES: ICD-10-CM

## 2019-05-10 DIAGNOSIS — Z01.00 EXAMINATION OF EYES AND VISION: Primary | ICD-10-CM

## 2019-05-10 DIAGNOSIS — H52.223 REGULAR ASTIGMATISM OF BOTH EYES: ICD-10-CM

## 2019-05-10 PROCEDURE — 92015 DETERMINE REFRACTIVE STATE: CPT | Performed by: OPTOMETRIST

## 2019-05-10 PROCEDURE — 92014 COMPRE OPH EXAM EST PT 1/>: CPT | Performed by: OPTOMETRIST

## 2019-05-10 ASSESSMENT — VISUAL ACUITY
OS_SC: 20/400-
OS_CC: 20/20
OD_CC: 20/20
OD_CC: 20/20
OD_SC: 20/400
METHOD: SNELLEN - LINEAR
OS_CC: 20/20

## 2019-05-10 ASSESSMENT — REFRACTION_WEARINGRX
OD_CYLINDER: +1.25
OS_CYLINDER: +0.75
OD_AXIS: 092
OS_SPHERE: -4.50
OD_SPHERE: -4.25
OS_AXIS: 087

## 2019-05-10 ASSESSMENT — CUP TO DISC RATIO
OD_RATIO: 0.4
OS_RATIO: 0.3

## 2019-05-10 ASSESSMENT — TONOMETRY
OD_IOP_MMHG: 13
IOP_METHOD: TONOPEN
OS_IOP_MMHG: 14

## 2019-05-10 ASSESSMENT — REFRACTION_MANIFEST
OD_AXIS: 092
OD_CYLINDER: +1.25
OS_AXIS: 087
OS_CYLINDER: +0.75
OD_SPHERE: -4.50
OS_SPHERE: -4.75

## 2019-05-10 ASSESSMENT — CONF VISUAL FIELD
OD_NORMAL: 1
METHOD: COUNTING FINGERS
OS_NORMAL: 1

## 2019-05-10 ASSESSMENT — EXTERNAL EXAM - LEFT EYE: OS_EXAM: NORMAL

## 2019-05-10 ASSESSMENT — EXTERNAL EXAM - RIGHT EYE: OD_EXAM: NORMAL

## 2019-05-10 ASSESSMENT — SLIT LAMP EXAM - LIDS
COMMENTS: NORMAL
COMMENTS: NORMAL

## 2019-05-10 NOTE — PATIENT INSTRUCTIONS
Eyeglass prescription given.    Return in 1 year for a complete eye exam or sooner if needed.    Willie Chang, STEPHANIA      The affects of the dilating drops last for 4- 6 hours.  You will be more sensitive to light and vision will be blurry up close.  Mydriatic sunglasses were given if needed.      Optometry Providers       Clinic Locations                                 Telephone Number   Dr. Kirti Cortez   [place] and [place]   [facility] [phone]     [facility] Hours:                [place] [place] [facility] Hours:       [facility] Hours:   [address]   [address] [address] [facility] [address]     [address]  [place] [address]   LEAH Caruso [address]    [place] Cortez [address]  Phone: [phone]                    Phone: [phone]     Phone: [phone]                      Monday 8:00-7:00                          Monday 8:00-7:00                          Monday 8:00-7:00              Tuesday 8:00-6:00                          Tuesday 8:00-7:00                          Tuesday 8:00-7:00              Wednesday 8:00-6:00                  Wednesday 8:00-7:00                   Wednesday 8:00-7:00      Thursday 8:00-6:00                        Thursday 8:00-7:00                         Thursday 8:00-7:00            Friday 8:00-5:00                              Friday 8:00-5:00                              Friday 8:00-5:00    [facility] Hours:   [address] [place] [address]  [phone]    Monday 8:00-7:00  Tuesday 8:00-7:00  Wednesday 8:00-7:00  Thursday 8:00-7:00  Friday 8:00-5:00  Please log on to [email or web address] to order your contact lenses.  The link is found on the [facility] page.  As always, Thank you for trusting us with your health care needs!

## 2019-05-10 NOTE — PROGRESS NOTES
Chief Complaint   Patient presents with     Annual Eye Exam         Last Eye Exam: 2018  Dilated Previously: Yes    What are you currently using to see?  Glasses/contacts-  Declines cl fit today       Distance Vision Acuity: Satisfied with vision    Near Vision Acuity: Satisfied with vision while reading  with glasses    Eye Comfort: good  Do you use eye drops? : No  Occupation or Hobbies: student- pre nursing- also works at [employer]       Medical, surgical and family histories reviewed and updated 5/10/2019.       OBJECTIVE: See Ophthalmology exam    ASSESSMENT:    ICD-10-CM    1. Examination of eyes and vision Z01.00 EYE EXAM (SIMPLE-NONBILLABLE)     REFRACTION   2. Myopia of both eyes H52.13 EYE EXAM (SIMPLE-NONBILLABLE)     REFRACTION   3. Regular astigmatism of both eyes H52.223 EYE EXAM (SIMPLE-NONBILLABLE)     REFRACTION      PLAN:     Patient Instructions   Eyeglass prescription given.    Return in 1 year for a complete eye exam or sooner if needed.    Willie Chang, OD

## 2019-05-22 ENCOUNTER — MYC MEDICAL ADVICE (OUTPATIENT)
Dept: PEDIATRICS | Facility: CLINIC | Age: 19
End: 2019-05-22

## 2019-06-19 ENCOUNTER — MYC MEDICAL ADVICE (OUTPATIENT)
Dept: PEDIATRICS | Facility: CLINIC | Age: 19
End: 2019-06-19

## 2019-06-20 ENCOUNTER — OFFICE VISIT (OUTPATIENT)
Dept: OBGYN | Facility: CLINIC | Age: 19
End: 2019-06-20
Payer: COMMERCIAL

## 2019-06-20 VITALS
BODY MASS INDEX: 44.41 KG/M2 | OXYGEN SATURATION: 97 % | WEIGHT: 293 LBS | HEART RATE: 69 BPM | HEIGHT: 68 IN | DIASTOLIC BLOOD PRESSURE: 94 MMHG | SYSTOLIC BLOOD PRESSURE: 139 MMHG

## 2019-06-20 DIAGNOSIS — Z13.1 SCREENING FOR DIABETES MELLITUS: ICD-10-CM

## 2019-06-20 DIAGNOSIS — Z00.00 ANNUAL PHYSICAL EXAM: Primary | ICD-10-CM

## 2019-06-20 DIAGNOSIS — Z30.09 GENERAL COUNSELING FOR PRESCRIPTION OF ORAL CONTRACEPTIVES: ICD-10-CM

## 2019-06-20 DIAGNOSIS — Z13.220 LIPID SCREENING: ICD-10-CM

## 2019-06-20 DIAGNOSIS — Z11.3 SCREEN FOR STD (SEXUALLY TRANSMITTED DISEASE): ICD-10-CM

## 2019-06-20 DIAGNOSIS — Z13.29 SCREENING FOR THYROID DISORDER: ICD-10-CM

## 2019-06-20 PROCEDURE — G0145 SCR C/V CYTO,THINLAYER,RESCR: HCPCS | Performed by: OBSTETRICS & GYNECOLOGY

## 2019-06-20 PROCEDURE — 87491 CHLMYD TRACH DNA AMP PROBE: CPT | Performed by: OBSTETRICS & GYNECOLOGY

## 2019-06-20 PROCEDURE — 99395 PREV VISIT EST AGE 18-39: CPT | Performed by: OBSTETRICS & GYNECOLOGY

## 2019-06-20 PROCEDURE — 87591 N.GONORRHOEAE DNA AMP PROB: CPT | Performed by: OBSTETRICS & GYNECOLOGY

## 2019-06-20 RX ORDER — HYDROXYZINE HYDROCHLORIDE 25 MG/1
25 TABLET, FILM COATED ORAL EVERY 4 HOURS PRN
COMMUNITY
Start: 2019-05-07

## 2019-06-20 RX ORDER — ACETAMINOPHEN AND CODEINE PHOSPHATE 120; 12 MG/5ML; MG/5ML
0.35 SOLUTION ORAL DAILY
Qty: 84 TABLET | Refills: 3 | Status: SHIPPED | OUTPATIENT
Start: 2019-06-20 | End: 2020-09-04

## 2019-06-20 ASSESSMENT — MIFFLIN-ST. JEOR: SCORE: 2310.26

## 2019-06-20 ASSESSMENT — PATIENT HEALTH QUESTIONNAIRE - PHQ9: SUM OF ALL RESPONSES TO PHQ QUESTIONS 1-9: 4

## 2019-06-20 NOTE — PATIENT INSTRUCTIONS
If you have any questions regarding your visit, Please contact your care team.    [facility]: [phone]      [facility] HOURS TELEPHONE NUMBER   Linnea DO Madisyn.    LIBERTAD Heller -    DESTIN Rodriguez, DESTIN Spencer RN     Monday, Wednesday, Thursday and Friday, [place]  8:30a.m-5:00 p.m   [facility]  [address] [place] [address]  [phone] ask for [facility]    Imaging [phone]       Urgent Care locations:    [facility] Saturday and Sunday   9 am - 5 pm    Monday-Friday   12 pm - 8 pm  Saturday and Sunday   9 am - 5 pm   [phone]     [facility] Labor and Delivery:  [phone]    If you need a medication refill, please contact your pharmacy. Please allow 3 business days for your refill to be completed.  As always, Thank you for trusting us with your healthcare needs!

## 2019-06-20 NOTE — PROGRESS NOTES
"Bev is a 19 year old female, , who is here for her annual exam.  She requests a refill of Micronor even though she is not sexually active and admits to forgetting to take them for days at a time.  She consents to chlamydia and GC screening.  Today is her first pap smear.  She is not in a fasting state so will have her return for labwork.  She will need to f/u with Dr. Fisher for elevated BP readings coupled with concerns regarding her weight (BMI 49.67).    ROS: Ten point review of systems was reviewed and negative except the above.    Health Maintenance   Topic Date Due     PAP  1998     HIV SCREENING  2013     PHQ-2  2019     INFLUENZA VACCINE (Season Ended) 2019     DTAP/TDAP/TD IMMUNIZATION (7 - Td) 2019     PREVENTIVE CARE VISIT  10/03/2019     CHLAMYDIA SCREENING  10/04/2019     EYE EXAM  05/10/2020     ZOSTER IMMUNIZATION (1 of 2) 2048     IPV IMMUNIZATION  Completed     HPV IMMUNIZATION  Completed     MENINGITIS IMMUNIZATION  Aged Out      Last pap: none  Last Mammogram: none  Last Dexa: none  Last Colonoscopy: none  Lab Results   Component Value Date    CHOL 166 2017     Lab Results   Component Value Date    HDL 45 2017     Lab Results   Component Value Date    LDL 88 2017     Lab Results   Component Value Date    TRIG 164 2017     No results found for: CHOLHDLRATIO      OBHX:      PSH: History reviewed. No pertinent surgical history.      PMH: Her past medical, surgical, and obstetric histories were reviewed and are documented in their appropriate chart areas.    ALL/Meds: Her medication and allergy histories were reviewed and are documented in their appropriate chart areas.    SH/FMH: Her social and family history was reviewed and documented in its appropriate chart area.    PE: BP (!) 139/94   Pulse 69   Ht 1.734 m (5' 8.25\")   Wt 149.3 kg (329 lb 1.6 oz)   LMP 2019 (Exact Date)   SpO2 97%   Breastfeeding? No   BMI 49.67 " kg/m    Body mass index is 49.67 kg/m .    General Appearance:  healthy, alert, active, no distress  Cardiovascular:  Regular rate and Rhythm without murmur  Neck: Supple, no adenopathy and thyroid normal  Lungs:  Clear, without wheeze, rale or rhonchi  Breast: normal breast exam  Abdomen: Benign, Soft, flat, non-tender, No masses, organomegaly, No inguinal nodes and Bowel sounds normoactive.   Pelvic:       - Ext: Vulva and perineum are normal without lesion, mass or discharge        - Urethra: normal without discharge        - Urethral Meatus: normal appearance       - Bladder: no tenderness, no masses       - Vagina: Normal mucosa, no discharge        - Cervix: normal and nulliparous       - Uterus:Normal shape, position and consistency        - Adnexa: Normal without masses or tenderness       - Rectal: deferred    A/P:  Well Woman Exam, Oral Contraception, Elevated BP Readings, Metabolic Syndrome     -  I discussed the new pap recommendations regarding screening.  Explained the rationale for increased intervals between paps.  Questions asked and answered.  She does agree to this regiment.  Pap was obtained and submitted   -  BC: Refill Micronor since she is not a candidate for estrogen use   -  F/u with Dr. Fisher for concerns regarding elevated BP readings and class III obesity (BMI 49.67)    Orders Placed This Encounter   Procedures     Pap imaged thin layer screen only - recommended age 21 - 24 years     Lipid Profile (Chol, Trig, HDL, LDL calc)     TSH with free T4 reflex     Glucose      -  Encouraged self-breast exam   -  Encouraged low fat diet, regular exercise, and adequate calcium intake.    Linnea Mars,   FACOG, FACS

## 2019-06-21 LAB
C TRACH DNA SPEC QL NAA+PROBE: NEGATIVE
N GONORRHOEA DNA SPEC QL NAA+PROBE: NEGATIVE
SPECIMEN SOURCE: NORMAL
SPECIMEN SOURCE: NORMAL

## 2019-06-25 LAB
COPATH REPORT: NORMAL
PAP: NORMAL

## 2019-07-17 DIAGNOSIS — Z13.220 LIPID SCREENING: ICD-10-CM

## 2019-07-17 DIAGNOSIS — Z13.29 SCREENING FOR THYROID DISORDER: ICD-10-CM

## 2019-07-17 DIAGNOSIS — Z13.1 SCREENING FOR DIABETES MELLITUS: ICD-10-CM

## 2019-07-17 LAB
CHOLEST SERPL-MCNC: 158 MG/DL
GLUCOSE SERPL-MCNC: 136 MG/DL (ref 70–99)
HDLC SERPL-MCNC: 36 MG/DL
LDLC SERPL CALC-MCNC: 82 MG/DL
NONHDLC SERPL-MCNC: 122 MG/DL
TRIGL SERPL-MCNC: 199 MG/DL
TSH SERPL DL<=0.005 MIU/L-ACNC: 1.28 MU/L (ref 0.4–4)

## 2019-07-17 PROCEDURE — 82947 ASSAY GLUCOSE BLOOD QUANT: CPT | Performed by: OBSTETRICS & GYNECOLOGY

## 2019-07-17 PROCEDURE — 84443 ASSAY THYROID STIM HORMONE: CPT | Performed by: OBSTETRICS & GYNECOLOGY

## 2019-07-17 PROCEDURE — 36415 COLL VENOUS BLD VENIPUNCTURE: CPT | Performed by: OBSTETRICS & GYNECOLOGY

## 2019-07-17 PROCEDURE — 80061 LIPID PANEL: CPT | Performed by: OBSTETRICS & GYNECOLOGY

## 2019-10-02 ENCOUNTER — HEALTH MAINTENANCE LETTER (OUTPATIENT)
Age: 19
End: 2019-10-02

## 2019-10-30 ENCOUNTER — HEALTH MAINTENANCE LETTER (OUTPATIENT)
Age: 19
End: 2019-10-30

## 2019-12-23 ENCOUNTER — NURSE TRIAGE (OUTPATIENT)
Dept: NURSING | Facility: CLINIC | Age: 19
End: 2019-12-23

## 2019-12-24 ENCOUNTER — OFFICE VISIT (OUTPATIENT)
Dept: URGENT CARE | Facility: URGENT CARE | Age: 19
End: 2019-12-24
Payer: COMMERCIAL

## 2019-12-24 VITALS
TEMPERATURE: 98.2 F | HEART RATE: 81 BPM | OXYGEN SATURATION: 98 % | SYSTOLIC BLOOD PRESSURE: 149 MMHG | WEIGHT: 293 LBS | BODY MASS INDEX: 50.26 KG/M2 | DIASTOLIC BLOOD PRESSURE: 90 MMHG | RESPIRATION RATE: 18 BRPM

## 2019-12-24 DIAGNOSIS — N92.6 IRREGULAR MENSES: ICD-10-CM

## 2019-12-24 DIAGNOSIS — Z32.00 PREGNANCY EXAMINATION OR TEST, PREGNANCY UNCONFIRMED: Primary | ICD-10-CM

## 2019-12-24 DIAGNOSIS — R10.2 PELVIC CRAMPING: ICD-10-CM

## 2019-12-24 LAB — HCG UR QL: NEGATIVE

## 2019-12-24 PROCEDURE — 99213 OFFICE O/P EST LOW 20 MIN: CPT | Performed by: PHYSICIAN ASSISTANT

## 2019-12-24 PROCEDURE — 81025 URINE PREGNANCY TEST: CPT | Performed by: PHYSICIAN ASSISTANT

## 2019-12-24 NOTE — TELEPHONE ENCOUNTER
Bev calls and says that her last 2 periods came close together and she wants to know why they came close together. Pt. Does not have her period now. Pt. Denies being under stress. Pt. Says that she is going to call her OB DrOsmin And speak to the Dr. In the AM. Declined triage.  Reason for Disposition    [1] Caller requesting NON-URGENT health information AND [2] PCP's office is the best resource    Additional Information    Negative: [1] Caller is not with the adult (patient) AND [2] reporting urgent symptoms    Negative: Lab result questions    Negative: Medication questions    Negative: Caller can't be reached by phone    Negative: Caller has already spoken to PCP or another triager    Negative: RN needs further essential information from caller in order to complete triage    Negative: Requesting regular office appointment    Protocols used: INFORMATION ONLY CALL-A-

## 2019-12-24 NOTE — PROGRESS NOTES
S: 19-year-old female is here with her mom for possible miscarriage.  Had a period December 2 which was very close to the one prior.  She has had some abdominal cramping.  No dysuria.  She was last sexually active December 9.  Normally her periods are every 35 to 40 days.  No fever.  Has possibly had some mild diarrhea.        No Known Allergies    Past Medical History:   Diagnosis Date     Diabetes (H)     prediabetic       blood glucose (NO BRAND SPECIFIED) lancets standard, Use to test blood sugar 2 times daily or as directed.  blood glucose monitoring (NO BRAND SPECIFIED) meter device kit, Use to test blood sugar 2 times daily or as directed.  blood glucose monitoring (NO BRAND SPECIFIED) test strip, Use to test blood sugars 2 times daily or as directed  hydrOXYzine (ATARAX) 25 MG tablet, Take 25 mg by mouth  norethindrone (MICRONOR) 0.35 MG tablet, Take 1 tablet (0.35 mg) by mouth daily  naproxen 500 MG TBEC, Take 500 mg by mouth as needed     No current facility-administered medications on file prior to visit.       Social History     Tobacco Use     Smoking status: Never Smoker     Smokeless tobacco: Never Used     Tobacco comment: Dad smokes outside   Substance Use Topics     Alcohol use: Yes     Comment: rare     Drug use: No       ROS:  General: negative for fever  ABD: Denies abd pain  : as above    OBJECTIVE:  BP (!) 149/90 (BP Location: Left arm, Patient Position: Chair, Cuff Size: Adult Large)   Pulse 81   Temp 98.2  F (36.8  C) (Oral)   Resp 18   Wt (!) 151 kg (333 lb)   LMP 12/02/2019   SpO2 98%   BMI 50.26 kg/m     General:   awake, alert, and cooperative.  NAD.   Head: Normocephalic, atraumatic.  Eyes: Conjunctiva clear, non icteric.   ABD: soft, no tenderness to palpation , no rigidity, guarding or rebound . No CVAT  Neuro: Alert and oriented - normal speech.   Results for orders placed or performed in visit on 12/24/19   HCG Qual, Urine ([id number])     Status: None   Result Value Ref Range     HCG Qual Urine Negative NEG^Negative       ASSESSMENT:      ICD-10-CM    1. Pregnancy examination or test, pregnancy unconfirmed Z32.00 HCG Qual, Urine ([id number])   2. Irregular menses N92.6    3. Pelvic cramping R10.2              PLAN: Exam today shows no tenderness abdominal exam.  Monitor pelvic cramping.  If worsens or localizes go to ER.  Otherwise follow-up with OB this week.  Of note she did have a normal thyroid test in July 2019.  Likely just had a blip in her hormones which caused 2 periods close together.  Recommend repeat pregnancy test in 1 week.  As per ordered above.  Drink plenty of fluids.  Prevention and treatment of UTI's discussed. Follow up with primary care physician if not improving.  Advised about symptoms which might herald more serious problems.      Hiral Page PA-C

## 2020-01-20 ENCOUNTER — TELEPHONE (OUTPATIENT)
Dept: OPTOMETRY | Facility: CLINIC | Age: 20
End: 2020-01-20

## 2020-01-20 NOTE — TELEPHONE ENCOUNTER
Faxed contact lens Rx to [facility] after receiving verification form from them.    Melanie Jeans, OA, 3:56 PM 01/20/2020

## 2020-03-16 ENCOUNTER — VIRTUAL VISIT (OUTPATIENT)
Dept: FAMILY MEDICINE | Facility: OTHER | Age: 20
End: 2020-03-16

## 2020-03-16 ENCOUNTER — NURSE TRIAGE (OUTPATIENT)
Dept: NURSING | Facility: CLINIC | Age: 20
End: 2020-03-16

## 2020-03-16 NOTE — TELEPHONE ENCOUNTER
Pt has not been feeling well the last few days.         Dry cough, low grade fever 100.0, and sore throat. Little bit of clear sinus drainage noted.   No shortness of breath or rash noted.    Feels really tired.    Has not traveled out of the country.    Does not know if they have been around anybody that has been sick or not.     No nausea or vomiting.  No diarrhea or constipation noted.      Would like to be seen.        Gave the Pt the [email or web address] Web Site for Virtual visit for Pt care.    Pt mentioned she would sign up for the Virtual Visit on line for Pt care.    Natalia Abdi RN  Central Triage Red Flags/Med Refills        Additional Information    Negative: Bluish (or gray) lips or face    Negative: Severe difficulty breathing (e.g., struggling for each breath, speaks in single words)    Negative: Rapid onset of cough and has hives    Negative: Coughing started suddenly after medicine, an allergic food or bee sting    Negative: Difficulty breathing after exposure to flames, smoke, or fumes    Negative: Sounds like a life-threatening emergency to the triager    Negative: Previous asthma attacks and this feels like asthma attack    Negative: Chest pain present when not coughing    Negative: Difficulty breathing    Negative: Passed out (i.e., fainted, collapsed and was not responding)    Negative: Patient sounds very sick or weak to the triager    Negative: Coughed up > 1 tablespoon (15 ml) blood (Exception: blood-tinged sputum)    Negative: Fever > 103 F (39.4 C)    Negative: Fever > 101 F (38.3 C) and over 60 years of age    Negative: Fever > 100.0 F (37.8 C) and has diabetes mellitus or a weak immune system (e.g., HIV positive, cancer chemotherapy, organ transplant, splenectomy, chronic steroids)    Negative: Fever > 100.0 F (37.8 C) and bedridden (e.g., nursing home patient, stroke, chronic illness, recovering from surgery)    Negative: Increasing ankle swelling    Negative: Wheezing is present    Negative:  SEVERE coughing spells (e.g., whooping sound after coughing, vomiting after coughing)    Negative: Coughing up michael-colored (reddish-brown) or blood-tinged sputum    Negative: Fever present > 3 days (72 hours)    Negative: Fever returns after gone for over 24 hours and symptoms worse or not improved    Negative: Using nasal washes and pain medicine > 24 hours and sinus pain persists    Negative: Known COPD or other severe lung disease (i.e., bronchiectasis, cystic fibrosis, lung surgery) and worsening symptoms (i.e., increased sputum purulence or amount, increased breathing difficulty)    Negative: Continuous (nonstop) coughing interferes with work or school and no improvement using cough treatment per Care Advice    Patient wants to be seen    Protocols used: COUGH-A-OH

## 2020-03-17 NOTE — PROGRESS NOTES
"Date: 2020 16:41:06  Clinician: Chelsea Garcia  Clinician NPI: [id number]  Patient: TAE BENEDICT  Patient : 2000  Patient Address: [address] [place] [id number]  Patient Phone: [phone]  Visit Protocol: URI  Patient Summary:  TAE is a 19 year old ( : 2000 ) female who initiated a Visit for COVID-19 (Coronavirus) evaluation and screening. When asked the question \"Please sign me up to receive news, health information and promotions from [employer].\", TAE responded \"No\".    TAE states her symptoms started 1-2 days ago.   Her symptoms consist of rhinitis, a cough, and nasal congestion. TAE also feels feverish but was unable to measure her temperature.   Symptom details     Nasal secretions: The color of her mucus is white.    Cough: TAE coughs a few times an hour and her cough is more bothersome at night. Phlegm does not come into her throat when she coughs. She believes her cough is caused by post-nasal drip.      TAE denies having ear pain, malaise, headache, facial pain or pressure, myalgias, wheezing, sore throat, teeth pain, and chills. She also denies having recent facial or sinus surgery in the past 60 days and taking antibiotic medication for the symptoms. She is not experiencing dyspnea.   Precipitating events  She has not recently been exposed to someone with influenza. TAE has been in close contact with the following high risk individuals: immunocompromised people, adults 65 or older, pregnant women, and people with asthma, heart disease or diabetes.   Pertinent COVID-19 (Coronavirus) information  TAE has not traveled internationally or to the areas where COVID-19 (Coronavirus) is widespread in the last 14 days before the start of her symptoms.   TAE has not had close contact with a suspected or laboratory-confirmed COVID-19 patient within 14 days of symptom onset.   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   Pertinent medical history  TAE does not get yeast infections when she takes antibiotics.   TAE needs a return to work/school note.   Weight: 320 lbs   TAE does not smoke or use smokeless tobacco.   She denies pregnancy and denies breastfeeding. She is currently menstruating.   Weight: 320 lbs    MEDICATIONS: Sharobel oral, ALLERGIES: NKDA  Clinician Response:  Dear TAE,   Based on the information you have provided, it is recommended that you go to one of our designated Coronavirus (Covid-19) testing centers to get a test done from your car.  We are offering testing from your car in [facility], [place], [facility] and [place].   To schedule a visit at [facility], [facility] or [place], please call [phone] to find out when the next available testing window is. Testing will be done in 1 hour blocks so that you can wait at home until we are available to more quickly perform your testing from the car.   To complete testing in [place] ONLY, follow the instructions below:    Go as soon as possible during the hours noted to [facility] ([facility]) [address], [place] [address]. Hours: M-F 7:30am-5pm    What to expect:   When you arrive please come park in the parking lot.   Be prepared to present photo ID   Call [phone] and let them know you have arrived.    They will ask for information to get you registered for a visit and will ask for the description of your car and where you are parked.    They will add you to the queue to get your test (you will stay in your car the entire time).   You will then be met by a provider who will perform a brief assessment in your car and collect samples to test for coronavirus and possibly influenza or RSV.    Isolate yourself while traveling.  Do Not allow any visitors within 6 feet.  Do Not go to work or school.  Do Not go to  [facility],  centers, shopping, or other public places.  Do Not shake hands.  Avoid close contact with others (hugging, kissing).Protect Others:     Cover Your Mouth and Nose with a mask, disposable tissue or wash cloth to avoid spreading germs to others.  Wash your hands and face frequently with soap and water   Fever Medicines:    For fever relief, take acetaminophen or ibuprofen.  Treat fevers above 101deg F (38.3deg C) to lower fevers and make you more comfortable.   Acetaminophen (e.g., Tylenol): Take 650 mg (two 325 mg pills) by mouth every 4-6 hours as needed of regular strength Tylenol or 1,000 mg (two 500 mg pills) every 8 hours as needed of Extra Strength Tylenol.   Ibuprofen (e.g., Motrin, Advil): Take 400 mg (two 200 mg pills) by mouth every 6 hours as needed.   Acetaminophen is thought to be safer than ibuprofen or naproxen for people over 65 years old. Acetaminophen is in many OTC and prescription medicines. It might be in more than one medicine that you are taking. You need to be careful and not take an overdose. Before taking any medicine, read all the instructions on the package.  Caution -NSAIDs (e.g., ibuprofen, naproxen): Do not take nonsteroidal anti-inflammatory drugs (NSAIDs) if you have stomach problems, kidney disease, heart failure, or other contraindications to using this type of medicine. Do not take NSAID medicines for over 7 days without consulting your PCP. Do not take NSAID medicines if you are pregnant. Do not take NSAID medicines if you are also taking blood thinners.    Call or submit a new visit if: Breathing difficulty develops or you become worse.  Thank you for limiting contact with others, wearing a simple mask to cover your cough, practice good hand hygiene habits and accessing our virtual services where possible to limit the spread of this virus.  For more information about COVID19 and options for caring for yourself at home, please visit the CDC website at  https://www.cdc.gov/coronavirus/2019-ncov/about/steps-when-sick.html  For more options for care at [facility], please visit our website at [email or web address]    Diagnosis: Acute nasopharyngitis [common cold]  Diagnosis ICD: J00

## 2020-03-25 PROBLEM — M54.42 LEFT-SIDED LOW BACK PAIN WITH LEFT-SIDED SCIATICA, UNSPECIFIED CHRONICITY: Status: RESOLVED | Noted: 2018-04-23 | Resolved: 2020-03-25

## 2020-03-25 NOTE — PROGRESS NOTES
Patient did not return for further treatment and no additional progress was noted.  Please refer to the progress note and goal flowsheet completed on 08/06/18 for discharge information.

## 2020-04-17 ENCOUNTER — MYC REFILL (OUTPATIENT)
Dept: PEDIATRICS | Facility: CLINIC | Age: 20
End: 2020-04-17

## 2020-04-17 DIAGNOSIS — R73.03 PRE-DIABETES: ICD-10-CM

## 2020-06-10 ENCOUNTER — E-VISIT (OUTPATIENT)
Dept: PEDIATRICS | Facility: CLINIC | Age: 20
End: 2020-06-10
Payer: COMMERCIAL

## 2020-06-10 DIAGNOSIS — Z53.9 ERRONEOUS ENCOUNTER--DISREGARD: Primary | ICD-10-CM

## 2020-06-10 PROCEDURE — 99207 ZZC NON-BILLABLE SERV PER CHARTING: CPT | Performed by: NURSE PRACTITIONER

## 2020-06-11 NOTE — TELEPHONE ENCOUNTER
CC: Followup for Primary open angle glaucoma (POAG)-adv right eye and mod left eye. Some tearing    HPI:   - Prostate cancer, currently undergoing treatment  - Feels that vision has been stable over the past year, no issues with drops.  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    Ophthalmic procedural/surgical history:  - trabeculectomy LE (1996) and revised (1997)  - SLT RE (10/14/04 & 2/17/05)  - cataract IOL RE (1985) LE (1996)  - punctal closure, plugs BE (2000)    Current ophthalmic medications-gets all medications from [facility]:  - brimonidine 1 drop both eyes BID  - Latanoprost 1 drop QHS both eyes   - dorzolamide 1 drop Both eyes BID  - artificial tears at bedtime both eyes     Results of studies/procedures today:  Octopus visual field [facility] both eyes  Right eye with superior and inferior nasal step and central involvement  Left eye inferior arcuate    Assessment and Plan:  Primary open angle glaucoma (POAG) adv right eye and mod left eye   May need further surgery but OCT stable  Changed to [facility] for reliability     RTC 6 months with OCT retinal nerve fiber layer and continue present management     Attending Physician Attestation:  Complete documentation of historical and exam elements from today's encounter can be found in the full encounter summary report (not reduplicated in this progress note). I personally obtained the chief complaint(s) and history of present illness. I confirmed and edited asnecessary the review of systems, past medical/surgical history, family history, social history, and examination findings as documented by others; and I examined the patient myself. I personally reviewed the relevant tests, images, and reports as documented above. I formulated and edited as necessary the assessment and plan and discussed the findings and management plan with the patient and family.  - Lissa Berry MD 2:49 PM  MC sent to pt to call and make an appointment for in clinic.Luz Maria MIRAMONTES,[facility]      Call center please help schedule.   9/11/2017

## 2020-08-13 ENCOUNTER — OFFICE VISIT (OUTPATIENT)
Dept: OBGYN | Facility: CLINIC | Age: 20
End: 2020-08-13
Payer: COMMERCIAL

## 2020-08-13 VITALS
BODY MASS INDEX: 43.4 KG/M2 | SYSTOLIC BLOOD PRESSURE: 147 MMHG | WEIGHT: 293 LBS | OXYGEN SATURATION: 99 % | HEART RATE: 75 BPM | DIASTOLIC BLOOD PRESSURE: 95 MMHG | HEIGHT: 69 IN

## 2020-08-13 DIAGNOSIS — Z13.220 LIPID SCREENING: ICD-10-CM

## 2020-08-13 DIAGNOSIS — Z13.29 SCREENING FOR THYROID DISORDER: ICD-10-CM

## 2020-08-13 DIAGNOSIS — L91.8 SKIN TAG: ICD-10-CM

## 2020-08-13 DIAGNOSIS — D22.9 BENIGN MOLE: ICD-10-CM

## 2020-08-13 DIAGNOSIS — Z11.3 SCREEN FOR STD (SEXUALLY TRANSMITTED DISEASE): ICD-10-CM

## 2020-08-13 DIAGNOSIS — Z13.1 SCREENING FOR DIABETES MELLITUS: ICD-10-CM

## 2020-08-13 DIAGNOSIS — Z30.09 GENERAL COUNSELING FOR PRESCRIPTION OF ORAL CONTRACEPTIVES: Primary | ICD-10-CM

## 2020-08-13 LAB
CHOLEST SERPL-MCNC: 196 MG/DL
GLUCOSE SERPL-MCNC: 138 MG/DL (ref 70–99)
HBA1C MFR BLD: 6.6 % (ref 0–5.6)
HDLC SERPL-MCNC: 40 MG/DL
LDLC SERPL CALC-MCNC: 106 MG/DL
NONHDLC SERPL-MCNC: 156 MG/DL
TRIGL SERPL-MCNC: 250 MG/DL
TSH SERPL DL<=0.005 MIU/L-ACNC: 1.09 MU/L (ref 0.4–4)

## 2020-08-13 PROCEDURE — 36415 COLL VENOUS BLD VENIPUNCTURE: CPT | Performed by: OBSTETRICS & GYNECOLOGY

## 2020-08-13 PROCEDURE — 87591 N.GONORRHOEAE DNA AMP PROB: CPT | Performed by: OBSTETRICS & GYNECOLOGY

## 2020-08-13 PROCEDURE — 87340 HEPATITIS B SURFACE AG IA: CPT | Performed by: OBSTETRICS & GYNECOLOGY

## 2020-08-13 PROCEDURE — 87389 HIV-1 AG W/HIV-1&-2 AB AG IA: CPT | Performed by: OBSTETRICS & GYNECOLOGY

## 2020-08-13 PROCEDURE — 87491 CHLMYD TRACH DNA AMP PROBE: CPT | Performed by: OBSTETRICS & GYNECOLOGY

## 2020-08-13 PROCEDURE — 99395 PREV VISIT EST AGE 18-39: CPT | Performed by: OBSTETRICS & GYNECOLOGY

## 2020-08-13 PROCEDURE — 80061 LIPID PANEL: CPT | Performed by: OBSTETRICS & GYNECOLOGY

## 2020-08-13 PROCEDURE — 84443 ASSAY THYROID STIM HORMONE: CPT | Performed by: OBSTETRICS & GYNECOLOGY

## 2020-08-13 PROCEDURE — 86780 TREPONEMA PALLIDUM: CPT | Performed by: OBSTETRICS & GYNECOLOGY

## 2020-08-13 PROCEDURE — 82947 ASSAY GLUCOSE BLOOD QUANT: CPT | Performed by: OBSTETRICS & GYNECOLOGY

## 2020-08-13 PROCEDURE — 83036 HEMOGLOBIN GLYCOSYLATED A1C: CPT | Performed by: OBSTETRICS & GYNECOLOGY

## 2020-08-13 RX ORDER — ACETAMINOPHEN AND CODEINE PHOSPHATE 120; 12 MG/5ML; MG/5ML
0.35 SOLUTION ORAL DAILY
Qty: 90 TABLET | Refills: 3 | Status: SHIPPED | OUTPATIENT
Start: 2020-08-13 | End: 2021-07-13

## 2020-08-13 ASSESSMENT — MIFFLIN-ST. JEOR: SCORE: 2296.3

## 2020-08-13 NOTE — PROGRESS NOTES
"Bev is a 20 year old female, , who is here for her annual exam and BCP refill.  She is in a fasting state so will check needed labwork today.  She would also like STD screening since she is in a new relationship but denies any known exposure.  She has not followed up with FP for elevated BP readings and morbid obesity so this was again advised today.  She would also like a referral to Derm due to a skin mole and tabs which bother her.      ROS: Ten point review of systems was reviewed and negative except the above.    Health Maintenance   Topic Date Due     HIV SCREENING  2013     DTAP/TDAP/TD IMMUNIZATION (7 - Td) 2019     PHQ-2  2020     EYE EXAM  05/10/2020     PREVENTIVE CARE VISIT  2020     CHLAMYDIA SCREENING  2020     INFLUENZA VACCINE (1) 2020     PAP  2022     IPV IMMUNIZATION  Completed     HPV IMMUNIZATION  Completed     HEPATITIS B IMMUNIZATION  Completed     MENINGITIS IMMUNIZATION  Aged Out      Last pap: 19 normal  Last Mammogram: not applicable  Last Dexa: not applicable  Last Colonoscopy: not applicable  Lab Results   Component Value Date    CHOL 158 2019     Lab Results   Component Value Date    HDL 36 2019     Lab Results   Component Value Date    LDL 82 2019     Lab Results   Component Value Date    TRIG 199 2019     No results found for: CHOLHDLRATIO      OBHX:      PSH: History reviewed. No pertinent surgical history.      PMH: Her past medical, surgical, and obstetric histories were reviewed and are documented in their appropriate chart areas.    ALL/Meds: Her medication and allergy histories were reviewed and are documented in their appropriate chart areas.    SH/FMH: Her social and family history was reviewed and documented in its appropriate chart area.    PE: BP (!) 147/95 (BP Location: Right arm, Cuff Size: Adult Large)   Pulse 75   Ht 1.742 m (5' 8.6\")   Wt 147.8 kg (325 lb 14.4 oz)   LMP 2020 " (Exact Date)   SpO2 99%   Breastfeeding No   BMI 48.69 kg/m    Body mass index is 48.69 kg/m .    General Appearance:  healthy, alert, active, no distress  Cardiovascular:  Regular rate and Rhythm without murmur  Neck: Supple, no adenopathy and thyroid normal  Lungs:  Clear, without wheeze, rale or rhonchi  Breast: normal breast exam  Abdomen: Benign, Soft, flat, non-tender, No masses, organomegaly, No inguinal nodes and Bowel sounds normoactive.   Pelvic:       - Ext: Vulva and perineum are normal without lesion, mass or discharge        - Urethra: normal without discharge        - Urethral Meatus: normal appearance       - Bladder: no tenderness, no masses       - Vagina: Normal mucosa, no discharge        - Cervix: normal and nulliparous       - Uterus:Normal shape, position and consistency        - Adnexa: Non palpable due to body habitus       - Rectal: deferred    A/P:  Well Woman Exam, BCP Refill (Micronor), Elevated BP Reading, STD Screen, Skin Mole and Tags     -  I discussed the new pap recommendations regarding screening.  Explained the rationale for increased intervals between paps.  Questions asked and answered.  She does agree to this regiment.  Pap was not obtained since not due until 6/2022   -  BC: Micronor.  She is not a candidate for estrogen-containing contraception due to her elevated BP reading.   -  Check labwork in a fasting state including STDs, glucose, hgbA1C, TSH/free T4, and lipid profile.  Safe sex measures were discussed.   -  Refer to Dermatology for skin mole and tags   -  Refer to FP due to elevated BP reading and morbid obesity issues  Orders Placed This Encounter   Procedures     Glucose     Hemoglobin A1c     TSH with free T4 reflex     Lipid Profile (Chol, Trig, HDL, LDL calc)     Treponema Abs w Reflex to RPR and Titer     HIV Antigen Antibody Combo     Hepatitis B surface antigen     DERMATOLOGY REFERRAL      -  Encouraged self-breast exam   -  Encouraged low fat diet,  regular exercise, and adequate calcium intake.     Linnea Mars,   FACOG, FACS

## 2020-08-14 LAB
C TRACH DNA SPEC QL NAA+PROBE: NEGATIVE
HBV SURFACE AG SERPL QL IA: NONREACTIVE
HIV 1+2 AB+HIV1 P24 AG SERPL QL IA: NONREACTIVE
N GONORRHOEA DNA SPEC QL NAA+PROBE: NEGATIVE
SPECIMEN SOURCE: NORMAL
SPECIMEN SOURCE: NORMAL
T PALLIDUM AB SER QL: NONREACTIVE

## 2020-09-04 ENCOUNTER — VIRTUAL VISIT (OUTPATIENT)
Dept: ENDOCRINOLOGY | Facility: CLINIC | Age: 20
End: 2020-09-04
Payer: COMMERCIAL

## 2020-09-04 DIAGNOSIS — E66.01 MORBID OBESITY (H): ICD-10-CM

## 2020-09-04 DIAGNOSIS — E11.9 TYPE 2 DIABETES MELLITUS WITHOUT COMPLICATION, WITH LONG-TERM CURRENT USE OF INSULIN (H): Primary | ICD-10-CM

## 2020-09-04 DIAGNOSIS — Z79.4 TYPE 2 DIABETES MELLITUS WITHOUT COMPLICATION, WITH LONG-TERM CURRENT USE OF INSULIN (H): Primary | ICD-10-CM

## 2020-09-04 PROCEDURE — 99204 OFFICE O/P NEW MOD 45 MIN: CPT | Mod: 95 | Performed by: INTERNAL MEDICINE

## 2020-09-04 RX ORDER — LIRAGLUTIDE 6 MG/ML
INJECTION SUBCUTANEOUS
Qty: 15 ML | Refills: 3 | Status: SHIPPED | OUTPATIENT
Start: 2020-09-04 | End: 2021-03-01

## 2020-09-04 NOTE — LETTER
"    9/4/2020         RE: Bev Araujo  [address]  [facility] [address]        Dear Colleague,    Thank you for referring your patient, Bev Araujo, to the [facility]. Please see a copy of my visit note below.    Blood sugars available in 9/3 My Chart Encounter (image).    Bev Araujo is a 20 year old female who is being evaluated via a billable video visit.      The patient has been notified of following:     \"This video visit will be conducted via a call between you and your physician/provider. We have found that certain health care needs can be provided without the need for an in-person physical exam.  This service lets us provide the care you need with a video conversation.  If a prescription is necessary we can send it directly to your pharmacy.  If lab work is needed we can place an order for that and you can then stop by our lab to have the test done at a later time.    Video visits are billed at different rates depending on your insurance coverage.  Please reach out to your insurance provider with any questions.    If during the course of the call the physician/provider feels a video visit is not appropriate, you will not be charged for this service.\"    Patient has given verbal consent for Video visit? Yes  How would you like to obtain your AVS? MyChart  If you are dropped from the video visit, the video invite should be resent to: Text to cell phone: [phone]  Will anyone else be joining your video visit? No        Video-Visit Details    Type of service:  Video Visit    Start: 09/04/2020 10:44 am   Stop: 09/04/2020 11:14 am     Originating Location (pt. Location): Home    Distant Location (provider location):  [facility]     Platform used for Video Visit: AmWell                                                                                 - Endocrinology Initial Consultation -    Reason for " visit/consult:  Data Unavailable    Primary care provider: Linnea Mars    HPI: A 21 yo female here for the evaluation of her diabetes.   Patient was diagnosed diabetes 2 years ago with elevated A1c 6.5.  She has been trying diet and exercise.   This year she cut down significant amount of soda in which she limit amount of sweets and chocolate.  She rarely eat fast food.  Follow-up A1c in 2020 was 6.6.  Never tried any medication or insulin.  She has family history of diabetes her older brother has diabetes diagnosed age 15, maternal grandfather has diabetes, paternal grandmother has diabetes, paternal uncle has diabetes.  Her body weight is 320 pounds and height 5 8, her body weight has been stable for the past few years.   She was also told elevated blood pressure but otherwise no medical history.  Her menarche was age 13-16 menstrual cycle regular taking birth control pill for the past 4 years.     Current Diabetic Regimens:      Life Style: evening shift  Wake up 10 am  Breakfast  Lunch   Dinner  Bedtime    Exercise:  At work lifting patients    DM complications:  Retinopathy:   Nephropathy:   Neuropathy:   Most recent LDL:   LDL Cholesterol Calculated   Date Value Ref Range Status   2020 106 (H) <100 mg/dL Final     Comment:     Above desirable:  100-129 mg/dl  Borderline High:  130-159 mg/dL  High:             160-189 mg/dL  Very high:       >189 mg/dl         Glucose Log: We downloaded glucometer and analyzed and summarize here.  Am glucose: 145, 126, 138, 114, 130    Evenin, 112, 163, 120    A1C trends from previous labs:   Hemoglobin A1C   Date Value Ref Range Status   2020 6.6 (H) 0 - 5.6 % Final     Comment:     Normal <5.7% Prediabetes 5.7-6.4%  Diabetes 6.5% or higher - adopted from ADA   consensus guidelines.     10/03/2018 6.5 (H) 0 - 5.6 % Final     Comment:     Normal <5.7% Prediabetes 5.7-6.4%  Diabetes 6.5% or higher - adopted from ADA   consensus guidelines.           Past Medical/Surgical History:  Past Medical History:   Diagnosis Date     Diabetes (H)     prediabetic     No past surgical history on file.    Allergies:  No Known Allergies    Current Medications   Current Outpatient Medications   Medication     blood glucose (NO BRAND SPECIFIED) lancets standard     blood glucose monitoring (NO BRAND SPECIFIED) meter device kit     blood glucose monitoring (NO BRAND SPECIFIED) test strip     hydrOXYzine (ATARAX) 25 MG tablet     naproxen 500 MG TBEC     norethindrone (MICRONOR) 0.35 MG tablet     No current facility-administered medications for this visit.        Family History:  Family History   Problem Relation Age of Onset     Hypertension Mother      Diabetes Maternal Grandfather      Diabetes Paternal Grandmother      Hypertension Paternal Grandmother      Diabetes Brother      Heart Disease Father      Other Cancer Maternal Grandmother         ovarian     Cancer Maternal Grandmother        Social History:  Social History     Tobacco Use     Smoking status: Never Smoker     Smokeless tobacco: Never Used     Tobacco comment: Dad smokes outside   Substance Use Topics     Alcohol use: Yes     Comment: rare   works full time assisted living.     ROS:  Full review of systems taken with the help of the intake sheet. Otherwise a complete 14 point review of systems was taken and is negative unless stated in the history above.      Physical Exam:   Vitals: LMP 08/05/2020 (Exact Date)   BMI= There is no height or weight on file to calculate BMI.   General: well appearing, no acute distress, pleasant and conversant,   Mental Status/neuro: alert and oriented  Face: symmetrical, normal facial color  Eyes: anicteric, no proptosis or lid lag  Resp: no acute ditress      Labs : I reviewed data from epic and extract and summarize the pertinent data here.   Lab Results   Component Value Date     10/03/2018      Lab Results   Component Value Date    POTASSIUM 4.1 10/03/2018     Lab  Results   Component Value Date    CHLORIDE 110 10/03/2018     Lab Results   Component Value Date    SEGUN 8.8 10/03/2018     Lab Results   Component Value Date    CO2 25 10/03/2018     Lab Results   Component Value Date    BUN 13 10/03/2018     Lab Results   Component Value Date    CR 0.55 10/03/2018     Lab Results   Component Value Date     08/13/2020     Lab Results   Component Value Date    TSH 1.09 08/13/2020     No results found for: T4  Lab Results   Component Value Date    A1C 6.6 08/13/2020       No results found for: IGF1  No results found for: LH  No results found for: FSH  No results found for: ESTROGEN  No results found for: PROLACTIN        Assessment and Plan  20 year old female with DM2, A1C 6.6, and overweight    Given mild DM and obesity, she may have most benefit from GLP1.     - Victoza 0.6 mg start, then titrate up every 2 week.     - if Victoza won't cover by her insurance, then next option will be Metfomin 500 mg BID then 1000 mg BID    RTC with me in 4-5 month.         Vy Rajan MD  Staff Physician  Endocrinology and Metabolism  License: [id number]        Again, thank you for allowing me to participate in the care of your patient.        Sincerely,        Vy Rajan MD

## 2020-09-04 NOTE — PROGRESS NOTES
"Blood sugars available in 9/3 My Chart Encounter (image).    Bev Araujo is a 20 year old female who is being evaluated via a billable video visit.      The patient has been notified of following:     \"This video visit will be conducted via a call between you and your physician/provider. We have found that certain health care needs can be provided without the need for an in-person physical exam.  This service lets us provide the care you need with a video conversation.  If a prescription is necessary we can send it directly to your pharmacy.  If lab work is needed we can place an order for that and you can then stop by our lab to have the test done at a later time.    Video visits are billed at different rates depending on your insurance coverage.  Please reach out to your insurance provider with any questions.    If during the course of the call the physician/provider feels a video visit is not appropriate, you will not be charged for this service.\"    Patient has given verbal consent for Video visit? Yes  How would you like to obtain your AVS? MyChart  If you are dropped from the video visit, the video invite should be resent to: Text to cell phone: [phone]  Will anyone else be joining your video visit? No        Video-Visit Details    Type of service:  Video Visit    Start: 09/04/2020 10:44 am   Stop: 09/04/2020 11:14 am     Originating Location (pt. Location): Home    Distant Location (provider location):  [facility]     Platform used for Video Visit: [facility]                                                                                 - Endocrinology Initial Consultation -    Reason for visit/consult:  Data Unavailable    Primary care provider: Linnea Mars    HPI: A 21 yo female here for the evaluation of her diabetes.   Patient was diagnosed diabetes 2 years ago with elevated A1c 6.5.  She has been trying diet and exercise.   This year she cut down " significant amount of soda in which she limit amount of sweets and chocolate.  She rarely eat fast food.  Follow-up A1c in 2020 was 6.6.  Never tried any medication or insulin.  She has family history of diabetes her older brother has diabetes diagnosed age 15, maternal grandfather has diabetes, paternal grandmother has diabetes, paternal uncle has diabetes.  Her body weight is 320 pounds and height 5 8, her body weight has been stable for the past few years.   She was also told elevated blood pressure but otherwise no medical history.  Her menarche was age 13-16 menstrual cycle regular taking birth control pill for the past 4 years.     Current Diabetic Regimens:      Life Style: evening shift  Wake up 10 am  Breakfast  Lunch   Dinner  Bedtime    Exercise:  At work lifting patients    DM complications:  Retinopathy:   Nephropathy:   Neuropathy:   Most recent LDL:   LDL Cholesterol Calculated   Date Value Ref Range Status   2020 106 (H) <100 mg/dL Final     Comment:     Above desirable:  100-129 mg/dl  Borderline High:  130-159 mg/dL  High:             160-189 mg/dL  Very high:       >189 mg/dl         Glucose Log: We downloaded glucometer and analyzed and summarize here.  Am glucose: 145, 126, 138, 114, 130    Evenin, 112, 163, 120    A1C trends from previous labs:   Hemoglobin A1C   Date Value Ref Range Status   2020 6.6 (H) 0 - 5.6 % Final     Comment:     Normal <5.7% Prediabetes 5.7-6.4%  Diabetes 6.5% or higher - adopted from ADA   consensus guidelines.     10/03/2018 6.5 (H) 0 - 5.6 % Final     Comment:     Normal <5.7% Prediabetes 5.7-6.4%  Diabetes 6.5% or higher - adopted from ADA   consensus guidelines.          Past Medical/Surgical History:  Past Medical History:   Diagnosis Date     Diabetes (H)     prediabetic     No past surgical history on file.    Allergies:  No Known Allergies    Current Medications   Current Outpatient Medications   Medication     blood glucose (NO BRAND  SPECIFIED) lancets standard     blood glucose monitoring (NO BRAND SPECIFIED) meter device kit     blood glucose monitoring (NO BRAND SPECIFIED) test strip     hydrOXYzine (ATARAX) 25 MG tablet     naproxen 500 MG TBEC     norethindrone (MICRONOR) 0.35 MG tablet     No current facility-administered medications for this visit.        Family History:  Family History   Problem Relation Age of Onset     Hypertension Mother      Diabetes Maternal Grandfather      Diabetes Paternal Grandmother      Hypertension Paternal Grandmother      Diabetes Brother      Heart Disease Father      Other Cancer Maternal Grandmother         ovarian     Cancer Maternal Grandmother        Social History:  Social History     Tobacco Use     Smoking status: Never Smoker     Smokeless tobacco: Never Used     Tobacco comment: Dad smokes outside   Substance Use Topics     Alcohol use: Yes     Comment: rare   works full time assisted living.     ROS:  Full review of systems taken with the help of the intake sheet. Otherwise a complete 14 point review of systems was taken and is negative unless stated in the history above.      Physical Exam:   Vitals: LMP 08/05/2020 (Exact Date)   BMI= There is no height or weight on file to calculate BMI.   General: well appearing, no acute distress, pleasant and conversant,   Mental Status/neuro: alert and oriented  Face: symmetrical, normal facial color  Eyes: anicteric, no proptosis or lid lag  Resp: no acute ditress      Labs : I reviewed data from epic and extract and summarize the pertinent data here.   Lab Results   Component Value Date     10/03/2018      Lab Results   Component Value Date    POTASSIUM 4.1 10/03/2018     Lab Results   Component Value Date    CHLORIDE 110 10/03/2018     Lab Results   Component Value Date    SEGUN 8.8 10/03/2018     Lab Results   Component Value Date    CO2 25 10/03/2018     Lab Results   Component Value Date    BUN 13 10/03/2018     Lab Results   Component Value Date     CR 0.55 10/03/2018     Lab Results   Component Value Date     08/13/2020     Lab Results   Component Value Date    TSH 1.09 08/13/2020     No results found for: T4  Lab Results   Component Value Date    A1C 6.6 08/13/2020       No results found for: IGF1  No results found for: LH  No results found for: FSH  No results found for: ESTROGEN  No results found for: PROLACTIN        Assessment and Plan  20 year old female with DM2, A1C 6.6, and overweight    Given mild DM and obesity, she may have most benefit from GLP1.     - Victoza 0.6 mg start, then titrate up every 2 week.     - if Victoza won't cover by her insurance, then next option will be Metfomin 500 mg BID then 1000 mg BID    RTC with me in 4-5 month.         Vy Rajan MD  Staff Physician  Endocrinology and Metabolism  License: [id number]

## 2020-09-22 ENCOUNTER — OFFICE VISIT (OUTPATIENT)
Dept: PEDIATRICS | Facility: CLINIC | Age: 20
End: 2020-09-22
Payer: COMMERCIAL

## 2020-09-22 ENCOUNTER — MYC REFILL (OUTPATIENT)
Dept: PEDIATRICS | Facility: CLINIC | Age: 20
End: 2020-09-22

## 2020-09-22 VITALS
TEMPERATURE: 98.2 F | SYSTOLIC BLOOD PRESSURE: 128 MMHG | HEART RATE: 77 BPM | OXYGEN SATURATION: 97 % | HEIGHT: 69 IN | BODY MASS INDEX: 43.4 KG/M2 | WEIGHT: 293 LBS | DIASTOLIC BLOOD PRESSURE: 89 MMHG

## 2020-09-22 DIAGNOSIS — E11.9 TYPE 2 DIABETES MELLITUS WITHOUT COMPLICATION, WITH LONG-TERM CURRENT USE OF INSULIN (H): Primary | ICD-10-CM

## 2020-09-22 DIAGNOSIS — Z23 NEED FOR PNEUMOCOCCAL VACCINATION: ICD-10-CM

## 2020-09-22 DIAGNOSIS — M54.50 RIGHT-SIDED LOW BACK PAIN WITHOUT SCIATICA, UNSPECIFIED CHRONICITY: ICD-10-CM

## 2020-09-22 DIAGNOSIS — E66.01 MORBID OBESITY (H): ICD-10-CM

## 2020-09-22 DIAGNOSIS — Z23 NEED FOR TDAP VACCINATION: ICD-10-CM

## 2020-09-22 DIAGNOSIS — R73.03 PRE-DIABETES: ICD-10-CM

## 2020-09-22 DIAGNOSIS — Z79.4 TYPE 2 DIABETES MELLITUS WITHOUT COMPLICATION, WITH LONG-TERM CURRENT USE OF INSULIN (H): Primary | ICD-10-CM

## 2020-09-22 PROCEDURE — 99214 OFFICE O/P EST MOD 30 MIN: CPT | Mod: 25 | Performed by: FAMILY MEDICINE

## 2020-09-22 PROCEDURE — 90472 IMMUNIZATION ADMIN EACH ADD: CPT | Performed by: FAMILY MEDICINE

## 2020-09-22 PROCEDURE — 90471 IMMUNIZATION ADMIN: CPT | Performed by: FAMILY MEDICINE

## 2020-09-22 PROCEDURE — 90715 TDAP VACCINE 7 YRS/> IM: CPT | Performed by: FAMILY MEDICINE

## 2020-09-22 PROCEDURE — 90732 PPSV23 VACC 2 YRS+ SUBQ/IM: CPT | Performed by: FAMILY MEDICINE

## 2020-09-22 RX ORDER — NAPROXEN 500 MG/1
500 TABLET ORAL 2 TIMES DAILY PRN
Qty: 60 TABLET | Refills: 0 | Status: SHIPPED | OUTPATIENT
Start: 2020-09-22

## 2020-09-22 ASSESSMENT — MIFFLIN-ST. JEOR: SCORE: 2279.97

## 2020-09-22 NOTE — PROGRESS NOTES
Subjective     Bev Araujo is a 20 year old female who presents to clinic today for the following health issues:    HPI     Patient had flu shot through her employer    Diabetes/Follow up from visit with Endocrinologist, Dr. 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   Deneis polyuria, polydipsia, and polyphagia.  Patient denies concerns for blurred or double vision, tingling, numbness or weakness of the extremities      How often are you checking your blood sugar? Two times daily  Blood sugar testing frequency justification:  Adjustment of medication(s)  What time of day are you checking your blood sugars (select all that apply)?  Before breakfast and bedtime  Have you had any blood sugars above 200?  No  Have you had any blood sugars below 70?  No    What symptoms do you notice when your blood sugar is low?  None    What concerns do you have today about your diabetes? None     Do you have any of these symptoms? (Select all that apply)  No numbness or tingling in feet.  No redness, sores or blisters on feet.  No complaints of excessive thirst.  No reports of blurry vision.  No significant changes to weight.    Have you had a diabetic eye exam in the last 12 months? No        BP Readings from Last 2 Encounters:   09/22/20 128/89   08/13/20 (!) 147/95  "    Hemoglobin A1C (%)   Date Value   08/13/2020 6.6 (H)   10/03/2018 6.5 (H)     LDL Cholesterol Calculated (mg/dL)   Date Value   08/13/2020 106 (H)   07/17/2019 82           How many servings of fruits and vegetables do you eat daily?  1-2 servings    On average, how many sweetened beverages do you drink each day (Examples: soda, juice, sweet tea, etc.  Do NOT count diet or artificially sweetened beverages)?   Once in a while    How many days per week do you exercise enough to make your heart beat faster? 0    How many minutes a day do you exercise enough to make your heart beat faster? 0    How many days per week do you miss taking your medication? 0        Review of Systems   CONSTITUTIONAL: NEGATIVE for fever, chills, change in weight  INTEGUMENTARY/SKIN: NEGATIVE for worrisome rashes, moles or lesions  EYES: NEGATIVE for vision changes or irritation  RESP: NEGATIVE for significant cough or SOB  CV: NEGATIVE for chest pain, palpitations or peripheral edema  GI: NEGATIVE for nausea, abdominal pain, heartburn, or change in bowel habits  MUSCULOSKELETAL: NEGATIVE for significant arthralgias or myalgia  NEURO: NEGATIVE for weakness, dizziness or paresthesias  ENDOCRINE: NEGATIVE for temperature intolerance, skin/hair changes and Hx diabetes  HEME/ALLERGY/IMMUNE: NEGATIVE for bleeding problems  PSYCHIATRIC: NEGATIVE for changes in mood or affect      Objective    /89 (BP Location: Right arm, Patient Position: Sitting, Cuff Size: Adult Large)   Pulse 77   Temp 98.2  F (36.8  C) (Temporal)   Ht 1.742 m (5' 8.6\")   Wt 146.2 kg (322 lb 4.8 oz)   LMP 08/30/2020   SpO2 97%   BMI 48.15 kg/m    Body mass index is 48.15 kg/m .  Physical Exam   GENERAL: healthy, alert, no distress and morbidly obese  RESP: lungs clear to auscultation - no rales, rhonchi or wheezes  CV: regular rate and rhythm, normal S1 S2, no S3 or S4, no murmur, click or rub, no peripheral edema and peripheral pulses strong  MS: no gross " "musculoskeletal defects noted, no edema  NEURO: Normal strength and tone, mentation intact and speech normal  PSYCH: mentation appears normal, affect normal/bright    No results found for this or any previous visit (from the past 24 hour(s)).        Assessment & Plan     Type 2 diabetes mellitus without complication, with long-term current use of insulin (H)  Lab Results   Component Value Date    A1C 6.6 08/13/2020    A1C 6.5 10/03/2018     A1c is 6.6 before the start of Victoza  Recommended patient to recheck the labs in 2 months  Appreciated patient's efforts on healthy eating, regular exercises, weight loss  Will follow-up with provider in 1 year since she sees Dr. Rajan in endocrinology for her diabetes care and Dr. Mars for her GYN care  Patient verbalised understanding and is agreeable to the plan.    - **A1C FUTURE 3mo; Future  - **Basic metabolic panel FUTURE anytime; Future    Need for pneumococcal vaccination    - PPSV23, IM/SUBQ (2+ YRS) - [id number]    Need for Tdap vaccination  TDAP    Morbid obesity (H)  Wt Readings from Last 5 Encounters:   09/22/20 146.2 kg (322 lb 4.8 oz)   08/13/20 147.8 kg (325 lb 14.4 oz)   12/24/19 (!) 151 kg (333 lb)   06/20/19 149.3 kg (329 lb 1.6 oz)   01/25/19 144.2 kg (317 lb 12.8 oz)     Emphasized on weight loss, portion control, low calorie and low fat diet, healthy eating, regular exercises.  Just started on Victoza 2 weeks ago, monitor    Right-sided low back pain without sciatica, unspecified chronicity  Prescription refills given on naproxen for intermittent right lower back pain  Expect improvement with losing weight and continue with regular walking exercises, reviewed lumbar stretches  - naproxen (NAPROSYN DR) 500 MG EC tablet; Take 500 mg by mouth 2 times daily as needed (pain)     BMI:   Estimated body mass index is 48.15 kg/m  as calculated from the following:    Height as of this encounter: 1.742 m (5' 8.6\").    Weight as of this encounter: 146.2 kg (322 " lb 4.8 oz).   Weight management plan: Patient referred to endocrine and/or weight management specialty        Work on weight loss  Regular exercise  Chart documentation done in part with Dragon Voice recognition Software. Although reviewed after completion, some word and grammatical error may remain.    See Patient Instructions    No follow-ups on file.    Dixie Fisher MD  [facility], [place]

## 2020-09-23 ENCOUNTER — MYC REFILL (OUTPATIENT)
Dept: PEDIATRICS | Facility: CLINIC | Age: 20
End: 2020-09-23

## 2020-09-23 DIAGNOSIS — R73.03 PRE-DIABETES: ICD-10-CM

## 2020-09-24 ENCOUNTER — MYC MEDICAL ADVICE (OUTPATIENT)
Dept: PEDIATRICS | Facility: CLINIC | Age: 20
End: 2020-09-24

## 2020-09-25 NOTE — TELEPHONE ENCOUNTER
Patient was seen for Diabetes follow up on 9/22/2020.    Refilled Rx per RN protocol. Informed patient in a separate encounter.    Reva Neri RN, [facility]

## 2020-09-25 NOTE — TELEPHONE ENCOUNTER
Duplicate request.    Rx was sent in another encounter.    Reva Neri RN, [facility]

## 2020-11-10 ENCOUNTER — OFFICE VISIT (OUTPATIENT)
Dept: OBGYN | Facility: CLINIC | Age: 20
End: 2020-11-10
Payer: COMMERCIAL

## 2020-11-10 VITALS
HEART RATE: 101 BPM | OXYGEN SATURATION: 96 % | DIASTOLIC BLOOD PRESSURE: 90 MMHG | SYSTOLIC BLOOD PRESSURE: 140 MMHG | BODY MASS INDEX: 46.72 KG/M2 | WEIGHT: 293 LBS

## 2020-11-10 DIAGNOSIS — Z32.00 PREGNANCY EXAMINATION OR TEST, PREGNANCY UNCONFIRMED: Primary | ICD-10-CM

## 2020-11-10 LAB — HCG SERPL QL: NEGATIVE

## 2020-11-10 PROCEDURE — 84703 CHORIONIC GONADOTROPIN ASSAY: CPT | Performed by: OBSTETRICS & GYNECOLOGY

## 2020-11-10 PROCEDURE — 36415 COLL VENOUS BLD VENIPUNCTURE: CPT | Performed by: OBSTETRICS & GYNECOLOGY

## 2020-11-10 PROCEDURE — 99213 OFFICE O/P EST LOW 20 MIN: CPT | Performed by: OBSTETRICS & GYNECOLOGY

## 2020-11-10 NOTE — PROGRESS NOTES
"  Bev is a 20 year old  referred here by self for consultation regarding request for \"blood\" pregnancy test. She has had 4 negative home pregnancy tests. The last one was three days ago.  She is om micronor progesterone pills for for contraception. She missed one pill ast month and has continued to take all her pills. LMP was September and has had regular monthly menses x 2.5 years.. She had one day of slight spotting last month.She started her present pack of pills on 10/20/20.    ROS: Ten point review of systems was reviewed and negative except the above.    Gyne: - abn pap (last pap ), - STD's    Past Medical History:   Diagnosis Date     Diabetes (H)     prediabetic     History reviewed. No pertinent surgical history.  Patient Active Problem List   Diagnosis     Morbid obesity (H)     Attention deficit disorder     Right-sided low back pain without sciatica, unspecified chronicity     Elevated BP without diagnosis of hypertension     Elevated fasting blood sugar     Type 2 diabetes mellitus without complication, with long-term current use of insulin (H)     Morbid obesity with BMI of 45.0-49.9, adult (H)       ALL/Meds: Her medication and allergy histories were reviewed and are documented in their appropriate chart areas.    SH: - tob, - EtOH,     FH: Her family history was reviewed and documented in its appropriate chart area.    PE: BP (!) 140/90 (BP Location: Right arm, Cuff Size: Adult Regular)   Pulse 101   Wt 141.8 kg (312 lb 11.2 oz)   LMP 2020   SpO2 96%   BMI 46.72 kg/m    Body mass index is 46.72 kg/m .    General Appearance:  healthy, alert, active, no distress  HEENT: NCAT    Results for orders placed or performed in visit on 11/10/20   HCG qualitative, Blood ([id number])     Status: None   Result Value Ref Range    HCG Qualitative Serum Negative NEG^Negative     A/P    ICD-10-CM    1. Pregnancy examination or test, pregnancy unconfirmed  Z32.00 HCG qualitative, Blood ([id number]) "     Patient is reassured of negative pregnancy test.  She will continue POP as ordered.    15 minutes was spent face to face with the patient today discussing her history, diagnosis, and follow-up plan as noted above.  Over 50% of the visit was spent in counseling and coordination of care.    Total Visit Time: 20 minutes.    CEPHAS AGBEH, MD.

## 2020-11-10 NOTE — PATIENT INSTRUCTIONS
If you have any questions regarding your visit, Please contact your care team.  [facility]: [phone]  [facility] CLINIC HOURS TELEPHONE NUMBER   Cephas Agbeh, M.D.      Ryan Fisher-  [place]-         Monday-[place]    8:00a.m-4:45 p.m    Tuesday--[place]     8:00a.m-4:45 p.m.    Thursday-[place]    8:00a.m-4:45 p.m.    Friday-[place]    8:00a.m-4:45 p.m    [facility]   [address] [place] [address]   [phone]-Ask for [facility]   Fax [phone]   Fxlqvar-[phone]     [facility]   [address] [place] [address] [address] [address]   [phone]    [facility]  [address] [facility] [address]  [phone]  Nwaqaqv-[phone]   Urgent Care locations:    [facility] Monday-Friday  5 pm - 9 pm  Saturday and Sunday   9 am - 5 pm   Monday-Friday   5 pm - 9 pm  Saturday and Sunday  9 am - 5 pm    [phone]   If you need a medication refill, please contact your pharmacy. Please allow 3 business days for your refill to be completed.  As always, Thank you for trusting us with your healthcare needs!

## 2020-11-16 DIAGNOSIS — E11.9 TYPE 2 DIABETES MELLITUS WITHOUT COMPLICATION, WITH LONG-TERM CURRENT USE OF INSULIN (H): ICD-10-CM

## 2020-11-16 DIAGNOSIS — Z79.4 TYPE 2 DIABETES MELLITUS WITHOUT COMPLICATION, WITH LONG-TERM CURRENT USE OF INSULIN (H): ICD-10-CM

## 2020-11-16 LAB
ANION GAP SERPL CALCULATED.3IONS-SCNC: 4 MMOL/L (ref 3–14)
BUN SERPL-MCNC: 11 MG/DL (ref 7–30)
CALCIUM SERPL-MCNC: 8.3 MG/DL (ref 8.5–10.1)
CHLORIDE SERPL-SCNC: 110 MMOL/L (ref 94–109)
CO2 SERPL-SCNC: 24 MMOL/L (ref 20–32)
CREAT SERPL-MCNC: 0.58 MG/DL (ref 0.52–1.04)
GFR SERPL CREATININE-BSD FRML MDRD: >90 ML/MIN/{1.73_M2}
GLUCOSE SERPL-MCNC: 113 MG/DL (ref 70–99)
HBA1C MFR BLD: 5.6 % (ref 0–5.6)
POTASSIUM SERPL-SCNC: 4.1 MMOL/L (ref 3.4–5.3)
SODIUM SERPL-SCNC: 138 MMOL/L (ref 133–144)

## 2020-11-16 PROCEDURE — 80048 BASIC METABOLIC PNL TOTAL CA: CPT | Performed by: FAMILY MEDICINE

## 2020-11-16 PROCEDURE — 36415 COLL VENOUS BLD VENIPUNCTURE: CPT | Performed by: FAMILY MEDICINE

## 2020-11-16 PROCEDURE — 83036 HEMOGLOBIN GLYCOSYLATED A1C: CPT | Performed by: FAMILY MEDICINE

## 2020-11-16 NOTE — RESULT ENCOUNTER NOTE
Dear Bev,  Your labs indicated normal A1c-diabetes test, improved blood sugars and normal kidney functions.  This is reassuring.   Let me know if you have any questions. Take care.  Dixie Fisher MD

## 2020-12-25 DIAGNOSIS — R73.03 PRE-DIABETES: ICD-10-CM

## 2020-12-27 RX ORDER — BLOOD SUGAR DIAGNOSTIC
STRIP MISCELLANEOUS
Qty: 100 STRIP | Refills: 11 | Status: SHIPPED | OUTPATIENT
Start: 2020-12-27

## 2021-01-27 ENCOUNTER — TELEPHONE (OUTPATIENT)
Dept: OPTOMETRY | Facility: CLINIC | Age: 21
End: 2021-01-27

## 2021-01-27 ENCOUNTER — OFFICE VISIT (OUTPATIENT)
Dept: OPTOMETRY | Facility: CLINIC | Age: 21
End: 2021-01-27
Payer: COMMERCIAL

## 2021-01-27 DIAGNOSIS — H52.223 REGULAR ASTIGMATISM OF BOTH EYES: ICD-10-CM

## 2021-01-27 DIAGNOSIS — E11.9 TYPE 2 DIABETES MELLITUS WITHOUT COMPLICATION, WITH LONG-TERM CURRENT USE OF INSULIN (H): ICD-10-CM

## 2021-01-27 DIAGNOSIS — Z01.00 EXAMINATION OF EYES AND VISION: Primary | ICD-10-CM

## 2021-01-27 DIAGNOSIS — Z79.4 TYPE 2 DIABETES MELLITUS WITHOUT COMPLICATION, WITH LONG-TERM CURRENT USE OF INSULIN (H): ICD-10-CM

## 2021-01-27 DIAGNOSIS — H52.13 MYOPIA OF BOTH EYES: ICD-10-CM

## 2021-01-27 PROCEDURE — 92014 COMPRE OPH EXAM EST PT 1/>: CPT | Performed by: OPTOMETRIST

## 2021-01-27 PROCEDURE — 92310 CONTACT LENS FITTING OU: CPT | Mod: GA | Performed by: OPTOMETRIST

## 2021-01-27 PROCEDURE — 92015 DETERMINE REFRACTIVE STATE: CPT | Performed by: OPTOMETRIST

## 2021-01-27 ASSESSMENT — TONOMETRY
OD_IOP_MMHG: 16
OS_IOP_MMHG: 18
IOP_METHOD: TONOPEN

## 2021-01-27 ASSESSMENT — REFRACTION_CURRENTRX
OD_BRAND: AIR OPTIX AQUA FOR ASTIGMATISM
OS_BASECURVE: 8.60
OS_DIAMETER: 14.5
OS_CYLINDER: -0.75
OD_DIAMETER: 14.5
OS_BASECURVE: 8.70
OD_AXIS: 180
OD_BASECURVE: 8.70
OS_SPHERE: -3.75
OD_DIAMETER: 14.5
OS_BRAND: AIR OPTIX AQUA
OS_BRAND: AIR OPTIX AQUA FOR ASTIGMATISM
OS_DIAMETER: 14.2
OS_SPHERE: -3.75
OD_CYLINDER: -1.25
OD_AXIS: 180
OD_SPHERE: -3.00
OD_CYLINDER: -0.75
OD_BASECURVE: 8.70
OD_SPHERE: -3.00
OD_BRAND: AIR OPTIX AQUA FOR ASTIGMATISM
OS_AXIS: 180

## 2021-01-27 ASSESSMENT — CUP TO DISC RATIO
OD_RATIO: 0.4
OS_RATIO: 0.3

## 2021-01-27 ASSESSMENT — REFRACTION_MANIFEST
OS_AXIS: 091
OD_AXIS: 092
OS_SPHERE: -5.25
OS_CYLINDER: +0.75
OD_CYLINDER: +1.50
OD_SPHERE: -5.00

## 2021-01-27 ASSESSMENT — CONF VISUAL FIELD
OS_NORMAL: 1
OD_NORMAL: 1

## 2021-01-27 ASSESSMENT — REFRACTION_WEARINGRX
OS_CYLINDER: +0.75
OD_SPHERE: -4.50
SPECS_TYPE: SVL
OS_AXIS: 087
OS_SPHERE: -4.75
OD_AXIS: 092
OD_CYLINDER: +1.25

## 2021-01-27 ASSESSMENT — VISUAL ACUITY
OD_CC: 20/30
OD_SC: 20/400-
OS_CC: 20/20
OS_CC: 20/30
CORRECTION_TYPE: GLASSES
OD_CC+: -1
OS_CC+: -2
OS_SC: 20/400-
OD_CC: 20/20
METHOD: SNELLEN - LINEAR

## 2021-01-27 ASSESSMENT — EXTERNAL EXAM - RIGHT EYE: OD_EXAM: NORMAL

## 2021-01-27 ASSESSMENT — EXTERNAL EXAM - LEFT EYE: OS_EXAM: NORMAL

## 2021-01-27 ASSESSMENT — SLIT LAMP EXAM - LIDS
COMMENTS: NORMAL
COMMENTS: NORMAL

## 2021-01-27 NOTE — LETTER
1/27/2021         RE: Bev Araujo  [address]  [place] [address]        Dear Colleague,    Thank you for referring your patient, Bev Araujo, to the [facility]. Please see a copy of my visit note below.    Chief Complaint   Patient presents with     Diabetic Eye Exam     Accompanied by self  Hemoglobin A1C   Date Value Ref Range Status   11/16/2020 5.6 0 - 5.6 % Final     Comment:     Normal <5.7% Prediabetes 5.7-6.4%  Diabetes 6.5% or higher - adopted from ADA   consensus guidelines.     08/13/2020 6.6 (H) 0 - 5.6 % Final     Comment:     Normal <5.7% Prediabetes 5.7-6.4%  Diabetes 6.5% or higher - adopted from ADA   consensus guidelines.     10/03/2018 6.5 (H) 0 - 5.6 % Final     Comment:     Normal <5.7% Prediabetes 5.7-6.4%  Diabetes 6.5% or higher - adopted from ADA   consensus guidelines.       Wears air optix contacts, good comfort     Last Eye Exam: 5-  Dilated Previously: Yes    What are you currently using to see?  glasses and contacts    Distance Vision Acuity: Noticed gradual change in both eyes    Near Vision Acuity: Satisfied with vision while reading  unaided    Eye Comfort: good  Do you use eye drops? 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     Medical, surgical and family histories reviewed and updated 1/27/2021.       OBJECTIVE: See Ophthalmology exam    ASSESSMENT:    ICD-10-CM    1. Examination of eyes and vision  Z01.00 EYE EXAM (SIMPLE-NONBILLABLE)   2. Myopia of both eyes  H52.13 REFRACTION     CONTACT LENS FITTING,BILAT w/ signed waiver   3. Regular astigmatism of both eyes  H52.223 REFRACTION     CONTACT LENS FITTING,BILAT w/ signed waiver   4. Type 2 diabetes mellitus without complication, with long-term current use of insulin (H)  E11.9 EYE EXAM (SIMPLE-NONBILLABLE)    Z79.4       PLAN:    Bev Araujo aware   eye exam results will be sent to Linnea Mars.  Patient Instructions   Eyeglass prescription given.    Contact lens prescription given and form signed.  Trial lenses will be ordered for  to try with new prescription.  Ok to order if satisfied.    There are not any signs of the diabetes affecting the eyes today.  It is important that you get your eyes dilated once yearly and keep good control of your diabetes.    Return in 1 year for a complete eye exam or sooner if needed.    Willie Chang, OD               Again, thank you for allowing me to participate in the care of your patient.        Sincerely,        Willie Chang, OD

## 2021-01-27 NOTE — PROGRESS NOTES
Chief Complaint   Patient presents with     Diabetic Eye Exam     Accompanied by self  Hemoglobin A1C   Date Value Ref Range Status   11/16/2020 5.6 0 - 5.6 % Final     Comment:     Normal <5.7% Prediabetes 5.7-6.4%  Diabetes 6.5% or higher - adopted from ADA   consensus guidelines.     08/13/2020 6.6 (H) 0 - 5.6 % Final     Comment:     Normal <5.7% Prediabetes 5.7-6.4%  Diabetes 6.5% or higher - adopted from ADA   consensus guidelines.     10/03/2018 6.5 (H) 0 - 5.6 % Final     Comment:     Normal <5.7% Prediabetes 5.7-6.4%  Diabetes 6.5% or higher - adopted from ADA   consensus guidelines.       Wears air optix contacts, good comfort     Last Eye Exam: 5-  Dilated Previously: Yes    What are you currently using to see?  glasses and contacts    Distance Vision Acuity: Noticed gradual change in both eyes    Near Vision Acuity: Satisfied with vision while reading  unaided    Eye Comfort: good  Do you use eye drops? 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     Medical, surgical and family histories reviewed and updated 1/27/2021.       OBJECTIVE: See Ophthalmology exam    ASSESSMENT:    ICD-10-CM    1. Examination of eyes and vision  Z01.00 EYE EXAM (SIMPLE-NONBILLABLE)   2. Myopia of both eyes  H52.13 REFRACTION     CONTACT LENS FITTING,BILAT w/ signed waiver   3. Regular astigmatism of both eyes  H52.223 REFRACTION     CONTACT LENS FITTING,BILAT w/ signed waiver   4. Type 2 diabetes mellitus without complication, with long-term current use of insulin (H)  E11.9 EYE EXAM (SIMPLE-NONBILLABLE)    Z79.4       PLAN:    Bev Araujo aware  eye exam results will be sent to Linnea Mars.  Patient Instructions   Eyeglass prescription given.    Contact lens prescription given and form signed.  Trial lenses will be ordered for  to try with new prescription.  Ok to order if satisfied.    There are not any signs of  the diabetes affecting the eyes today.  It is important that you get your eyes dilated once yearly and keep good control of your diabetes.    Return in 1 year for a complete eye exam or sooner if needed.    Willie Chang, STEPHANIA

## 2021-01-27 NOTE — TELEPHONE ENCOUNTER
Right Air Optix Aqua for Astigmatism with Hydraclear 8.70 14.5 -3.50 -1.25 180   Left Air Optix Aqua for Astigmatism with Hydraclear 8.70 14.5 -4.25 -0.75 180       Order trials of final prescription for - ok to order if satisfied.    Kelin Levin ORDERED CONTACTS 1/27/2021.

## 2021-01-27 NOTE — PATIENT INSTRUCTIONS
Eyeglass prescription given.    Contact lens prescription given and form signed.  Trial lenses will be ordered for  to try with new prescription.  Ok to order if satisfied.    There are not any signs of the diabetes affecting the eyes today.  It is important that you get your eyes dilated once yearly and keep good control of your diabetes.    Return in 1 year for a complete eye exam or sooner if needed.    Willie Chang, STEPHANIA    The affects of the dilating drops last for 4- 6 hours.  You will be more sensitive to light and vision will be blurry up close.  Do not drive if you do not feel comfortable.  Mydriatic sunglasses were given if needed.    Patient Education   Diabetes weakens the blood vessels all over the body, including the eyes. Damage to the blood vessels in the eyes can cause swelling or bleeding into part of the eye (called the retina). This is called diabetic retinopathy (LEAH-tin--pu-thee). If not treated, this disease can cause vision loss or blindness.   Symptoms may include blurred or distorted vision, but many people have no symptoms. It's important to see your eye doctor regularly to check for problems.   Early treatment and good control can help protect your vision. Here are the things you can do to help prevent vision loss:      1. Keep your blood sugar levels under tight control.      2. Bring high blood pressure under control.      3. No smoking.      4. Have yearly dilated eye exams.       Optometry Providers       Clinic Locations                                 Telephone Number   Dr. Kirti Whiting  [facility] [phone]     [facility] Hours:                [facility] Hours:       [facility] Hours:   [address]   [address]     [address] [place] [address]   [place] [address]    [place] [address]  Phone: [phone]                    Phone:  [phone]     Phone: [phone]                      Monday 8:00-7:00                          Monday 8:00-7:00                          Monday 8:00-7:00              Tuesday 8:00-6:00                          Tuesday 8:00-7:00                          Tuesday 8:00-7:00              Wednesday 8:00-6:00                  Wednesday 8:00-7:00                   Wednesday 8:00-7:00      Thursday 8:00-6:00                        Thursday 8:00-7:00                         Thursday 8:00-7:00            Friday 8:00-5:00                              Friday 8:00-5:00                              Friday 8:00-5:00    [facility] Hours:   [address] [place] [address]  [phone]    Monday 8:00-7:00  Tuesday 8:00-7:00  Wednesday 8:00-7:00  Thursday 8:00-7:00  Friday 8:00-5:00  Please log on to [email or web address] to order your contact lenses.  The link is found on the [facility] page.  As always, Thank you for trusting us with your health care needs!

## 2021-02-05 ENCOUNTER — VIRTUAL VISIT (OUTPATIENT)
Dept: ENDOCRINOLOGY | Facility: CLINIC | Age: 21
End: 2021-02-05
Payer: COMMERCIAL

## 2021-02-05 DIAGNOSIS — E66.01 MORBID OBESITY (H): ICD-10-CM

## 2021-02-05 DIAGNOSIS — E11.9 TYPE 2 DIABETES MELLITUS WITHOUT COMPLICATION, WITHOUT LONG-TERM CURRENT USE OF INSULIN (H): Primary | ICD-10-CM

## 2021-02-05 PROCEDURE — 99214 OFFICE O/P EST MOD 30 MIN: CPT | Mod: 95 | Performed by: INTERNAL MEDICINE

## 2021-02-05 NOTE — PROGRESS NOTES
See 1/25/21 MyC encounter for blood sugar readings.    .Bev is a 20 year old who is being evaluated via a billable video visit.      How would you like to obtain your AVS? MyChart  If the video visit is dropped, the invitation should be resent by: Send to e-mail at: [email or web address]  Will anyone else be joining your video visit? No    Yoly Packer CMA  Adult Endocrinology  [facility]      Video-Visit Details    Type of service:  Video Visit    Start: 2/5/2021 8:30 am   Stop: 2/5/2021 9:00 am     Originating Location (pt. Location): Home    Distant Location (provider location):  [facility]     Platform used for Video Visit: [facility]                                                                                 - Endocrinology Follow up -    Reason for visit/consult:  DM    Primary care provider: Linnea Mars      Assessment and Plan  20 year old female with DM2, A1C 6.6, and overweight    Given mild DM and obesity, she may have most benefit from GLP1.     - Continue Victoza 1.8 mg daily    - A1C, CMP, lipid, urine microalbumin upon next visit      RTC with me in 6 month.         Vy Rajan MD  Staff Physician  Endocrinology and Metabolism  License: [id number]      Interval History as of 2/5/2021 : Patient has been doing well. Last seen 6 moth ago. Medication compliance: tolerating to victoza and titrated up   . New event includes lost 13 lb (now 310) .  HPI: A 21 yo female here for the evaluation of her diabetes.   Patient was diagnosed diabetes 2 years ago with elevated A1c 6.5.  She has been trying diet and exercise.   This year she cut down significant amount of soda in which she limit amount of sweets and chocolate.  She rarely eat fast food.  Follow-up A1c in August 2020 was 6.6.  Never tried any medication or insulin.  She has family history of diabetes her older brother has diabetes diagnosed age 15, maternal grandfather has diabetes,  paternal grandmother has diabetes, paternal uncle has diabetes.  Her body weight is 320 pounds and height 5 8, her body weight has been stable for the past few years.   She was also told elevated blood pressure but otherwise no medical history.  Her menarche was age [age] menstrual cycle regular taking birth control pill for the past 4 years.     Current Diabetic Regimens:  Victoza 1.8 mg     Life Style: evening shift  Wake up 10 am  Breakfast  Lunch   Dinner  Bedtime    Exercise:  At work lifting patients    DM complications:  Retinopathy:   Nephropathy:   Neuropathy:   Most recent LDL:   LDL Cholesterol Calculated   Date Value Ref Range Status   08/13/2020 106 (H) <100 mg/dL Final     Comment:     Above desirable:  100-129 mg/dl  Borderline High:  130-159 mg/dL  High:             160-189 mg/dL  Very high:       >189 mg/dl         Glucose Log: We downloaded glucometer and analyzed and summarize here.  Average glucose 110, all 100% in target    A1C trends from previous labs:   Hemoglobin A1C   Date Value Ref Range Status   11/16/2020 5.6 0 - 5.6 % Final     Comment:     Normal <5.7% Prediabetes 5.7-6.4%  Diabetes 6.5% or higher - adopted from ADA   consensus guidelines.     08/13/2020 6.6 (H) 0 - 5.6 % Final     Comment:     Normal <5.7% Prediabetes 5.7-6.4%  Diabetes 6.5% or higher - adopted from ADA   consensus guidelines.     10/03/2018 6.5 (H) 0 - 5.6 % Final     Comment:     Normal <5.7% Prediabetes 5.7-6.4%  Diabetes 6.5% or higher - adopted from ADA   consensus guidelines.          Past Medical/Surgical History:  Past Medical History:   Diagnosis Date     Diabetes (H)     prediabetic     No past surgical history on file.    Allergies:  No Known Allergies    Current Medications   Current Outpatient Medications   Medication     blood glucose (NO BRAND SPECIFIED) lancets standard     blood glucose monitoring (NO BRAND SPECIFIED) meter device kit     hydrOXYzine (ATARAX) 25 MG tablet     liraglutide (VICTOZA PEN)  18 MG/3ML solution     naproxen (NAPROSYN DR) 500 MG EC tablet     norethindrone (MICRONOR) 0.35 MG tablet     ONETOUCH ULTRA test strip     No current facility-administered medications for this visit.        Family History:  Family History   Problem Relation Age of Onset     Hypertension Mother      Glaucoma Mother      Diabetes Maternal Grandfather      Diabetes Paternal Grandmother      Hypertension Paternal Grandmother      Diabetes Brother      Glaucoma Brother      Heart Disease Father      Other Cancer Maternal Grandmother         ovarian     Cancer Maternal Grandmother      Glaucoma Maternal Grandmother      Macular Degeneration Maternal Grandmother        Social History:  Social History     Tobacco Use     Smoking status: Never Smoker     Smokeless tobacco: Never Used     Tobacco comment: Dad smokes outside   Substance Use Topics     Alcohol use: Yes     Comment: rare   works full time assisted living.     ROS:  Full review of systems taken with the help of the intake sheet. Otherwise a complete 14 point review of systems was taken and is negative unless stated in the history above.      Physical Exam:   Vitals: There were no vitals taken for this visit.  BMI= There is no height or weight on file to calculate BMI.   General: well appearing, no acute distress, pleasant and conversant,   Mental Status/neuro: alert and oriented  Face: symmetrical, normal facial color  Eyes: anicteric, no proptosis or lid lag  Resp: no acute ditress      Labs : I reviewed data from epic and extract and summarize the pertinent data here.   Lab Results   Component Value Date     10/03/2018      Lab Results   Component Value Date    POTASSIUM 4.1 10/03/2018     Lab Results   Component Value Date    CHLORIDE 110 10/03/2018     Lab Results   Component Value Date    SEGUN 8.8 10/03/2018     Lab Results   Component Value Date    CO2 25 10/03/2018     Lab Results   Component Value Date    BUN 13 10/03/2018     Lab Results    Component Value Date    CR 0.55 10/03/2018     Lab Results   Component Value Date     08/13/2020     Lab Results   Component Value Date    TSH 1.09 08/13/2020     No results found for: T4  Lab Results   Component Value Date    A1C 6.6 08/13/2020       No results found for: IGF1  No results found for: LH  No results found for: FSH  No results found for: ESTROGEN  No results found for: PROLACTIN

## 2021-02-05 NOTE — LETTER
2/5/2021         RE: Bev Araujo  [address]  [facility] [address]        Dear Colleague,    Thank you for referring your patient, Bev Araujo, to the [facility]. Please see a copy of my visit note below.    See 1/25/21 MyC encounter for blood sugar readings.    .Bev is a 20 year old who is being evaluated via a billable video visit.      How would you like to obtain your AVS? MyChart  If the video visit is dropped, the invitation should be resent by: Send to e-mail at: [email or web address]  Will anyone else be joining your video visit? No    Yoly Packer CMA  Adult Endocrinology  [facility]      Video-Visit Details    Type of service:  Video Visit    Start: 2/5/2021 8:30 am   Stop: 2/5/2021 9:00 am     Originating Location (pt. Location): Home    Distant Location (provider location):  [facility]     Platform used for Video Visit: Well                                                                                 - Endocrinology Follow up -    Reason for visit/consult:  DM    Primary care provider: Linnea Mars      Assessment and Plan  20 year old female with DM2, A1C 6.6, and overweight    Given mild DM and obesity, she may have most benefit from GLP1.     - Continue Victoza 1.8 mg daily    - A1C, CMP, lipid, urine microalbumin upon next visit      RTC with me in 6 month.         Vy Rajan MD  Staff Physician  Endocrinology and Metabolism  License: [id number]      Interval History as of 2/5/2021 : Patient has been doing well. Last seen 6 moth ago. Medication compliance: tolerating to victoza and titrated up   . New event includes lost 13 lb (now 310) .  HPI: A 21 yo female here for the evaluation of her diabetes.   Patient was diagnosed diabetes 2 years ago with elevated A1c 6.5.  She has been trying diet and exercise.   This year she cut down significant amount  of soda in which she limit amount of sweets and chocolate.  She rarely eat fast food.  Follow-up A1c in August 2020 was 6.6.  Never tried any medication or insulin.  She has family history of diabetes her older brother has diabetes diagnosed age 15, maternal grandfather has diabetes, paternal grandmother has diabetes, paternal uncle has diabetes.  Her body weight is 320 pounds and height 5 8, her body weight has been stable for the past few years.   She was also told elevated blood pressure but otherwise no medical history.  Her menarche was age [age] menstrual cycle regular taking birth control pill for the past 4 years.     Current Diabetic Regimens:  Victoza 1.8 mg     Life Style: evening shift  Wake up 10 am  Breakfast  Lunch   Dinner  Bedtime    Exercise:  At work lifting patients    DM complications:  Retinopathy:   Nephropathy:   Neuropathy:   Most recent LDL:   LDL Cholesterol Calculated   Date Value Ref Range Status   08/13/2020 106 (H) <100 mg/dL Final     Comment:     Above desirable:  100-129 mg/dl  Borderline High:  130-159 mg/dL  High:             160-189 mg/dL  Very high:       >189 mg/dl         Glucose Log: We downloaded glucometer and analyzed and summarize here.  Average glucose 110, all 100% in target    A1C trends from previous labs:   Hemoglobin A1C   Date Value Ref Range Status   11/16/2020 5.6 0 - 5.6 % Final     Comment:     Normal <5.7% Prediabetes 5.7-6.4%  Diabetes 6.5% or higher - adopted from ADA   consensus guidelines.     08/13/2020 6.6 (H) 0 - 5.6 % Final     Comment:     Normal <5.7% Prediabetes 5.7-6.4%  Diabetes 6.5% or higher - adopted from ADA   consensus guidelines.     10/03/2018 6.5 (H) 0 - 5.6 % Final     Comment:     Normal <5.7% Prediabetes 5.7-6.4%  Diabetes 6.5% or higher - adopted from ADA   consensus guidelines.          Past Medical/Surgical History:  Past Medical History:   Diagnosis Date     Diabetes (H)     prediabetic     No past surgical history on  file.    Allergies:  No Known Allergies    Current Medications   Current Outpatient Medications   Medication     blood glucose (NO BRAND SPECIFIED) lancets standard     blood glucose monitoring (NO BRAND SPECIFIED) meter device kit     hydrOXYzine (ATARAX) 25 MG tablet     liraglutide (VICTOZA PEN) 18 MG/3ML solution     naproxen (NAPROSYN DR) 500 MG EC tablet     norethindrone (MICRONOR) 0.35 MG tablet     ONETOUCH ULTRA test strip     No current facility-administered medications for this visit.        Family History:  Family History   Problem Relation Age of Onset     Hypertension Mother      Glaucoma Mother      Diabetes Maternal Grandfather      Diabetes Paternal Grandmother      Hypertension Paternal Grandmother      Diabetes Brother      Glaucoma Brother      Heart Disease Father      Other Cancer Maternal Grandmother         ovarian     Cancer Maternal Grandmother      Glaucoma Maternal Grandmother      Macular Degeneration Maternal Grandmother        Social History:  Social History     Tobacco Use     Smoking status: Never Smoker     Smokeless tobacco: Never Used     Tobacco comment: Dad smokes outside   Substance Use Topics     Alcohol use: Yes     Comment: rare   works full time assisted living.     ROS:  Full review of systems taken with the help of the intake sheet. Otherwise a complete 14 point review of systems was taken and is negative unless stated in the history above.      Physical Exam:   Vitals: There were no vitals taken for this visit.  BMI= There is no height or weight on file to calculate BMI.   General: well appearing, no acute distress, pleasant and conversant,   Mental Status/neuro: alert and oriented  Face: symmetrical, normal facial color  Eyes: anicteric, no proptosis or lid lag  Resp: no acute ditress      Labs : I reviewed data from epic and extract and summarize the pertinent data here.   Lab Results   Component Value Date     10/03/2018      Lab Results   Component Value Date     POTASSIUM 4.1 10/03/2018     Lab Results   Component Value Date    CHLORIDE 110 10/03/2018     Lab Results   Component Value Date    SEGUN 8.8 10/03/2018     Lab Results   Component Value Date    CO2 25 10/03/2018     Lab Results   Component Value Date    BUN 13 10/03/2018     Lab Results   Component Value Date    CR 0.55 10/03/2018     Lab Results   Component Value Date     08/13/2020     Lab Results   Component Value Date    TSH 1.09 08/13/2020     No results found for: T4  Lab Results   Component Value Date    A1C 6.6 08/13/2020       No results found for: IGF1  No results found for: LH  No results found for: FSH  No results found for: ESTROGEN  No results found for: PROLACTIN            Again, thank you for allowing me to participate in the care of your patient.        Sincerely,        Vy Rajan MD

## 2021-03-01 DIAGNOSIS — Z79.4 TYPE 2 DIABETES MELLITUS WITHOUT COMPLICATION, WITH LONG-TERM CURRENT USE OF INSULIN (H): ICD-10-CM

## 2021-03-01 DIAGNOSIS — E11.9 TYPE 2 DIABETES MELLITUS WITHOUT COMPLICATION, WITH LONG-TERM CURRENT USE OF INSULIN (H): ICD-10-CM

## 2021-03-01 DIAGNOSIS — E66.01 MORBID OBESITY (H): ICD-10-CM

## 2021-03-01 RX ORDER — LIRAGLUTIDE 6 MG/ML
INJECTION SUBCUTANEOUS
Qty: 9 ML | Refills: 3 | Status: SHIPPED | OUTPATIENT
Start: 2021-03-01 | End: 2021-06-30

## 2021-03-21 ENCOUNTER — HEALTH MAINTENANCE LETTER (OUTPATIENT)
Age: 21
End: 2021-03-21

## 2021-03-31 DIAGNOSIS — E11.9 TYPE 2 DIABETES MELLITUS WITHOUT COMPLICATION, WITHOUT LONG-TERM CURRENT USE OF INSULIN (H): ICD-10-CM

## 2021-03-31 LAB — HBA1C MFR BLD: 5.7 % (ref 0–5.6)

## 2021-03-31 PROCEDURE — 36415 COLL VENOUS BLD VENIPUNCTURE: CPT | Performed by: INTERNAL MEDICINE

## 2021-03-31 PROCEDURE — 83036 HEMOGLOBIN GLYCOSYLATED A1C: CPT | Performed by: INTERNAL MEDICINE

## 2021-04-16 ENCOUNTER — MYC MEDICAL ADVICE (OUTPATIENT)
Dept: FAMILY MEDICINE | Facility: CLINIC | Age: 21
End: 2021-04-16

## 2021-05-17 ENCOUNTER — E-VISIT (OUTPATIENT)
Dept: URGENT CARE | Facility: URGENT CARE | Age: 21
End: 2021-05-17
Payer: COMMERCIAL

## 2021-05-17 ENCOUNTER — OFFICE VISIT (OUTPATIENT)
Dept: URGENT CARE | Facility: URGENT CARE | Age: 21
End: 2021-05-17
Payer: COMMERCIAL

## 2021-05-17 VITALS
HEART RATE: 80 BPM | RESPIRATION RATE: 14 BRPM | SYSTOLIC BLOOD PRESSURE: 122 MMHG | DIASTOLIC BLOOD PRESSURE: 84 MMHG | OXYGEN SATURATION: 99 % | TEMPERATURE: 99 F

## 2021-05-17 DIAGNOSIS — E11.9 TYPE 2 DIABETES MELLITUS WITHOUT COMPLICATION, WITH LONG-TERM CURRENT USE OF INSULIN (H): ICD-10-CM

## 2021-05-17 DIAGNOSIS — N92.1 BREAKTHROUGH BLEEDING ON BIRTH CONTROL PILLS: Primary | ICD-10-CM

## 2021-05-17 DIAGNOSIS — Z79.4 TYPE 2 DIABETES MELLITUS WITHOUT COMPLICATION, WITH LONG-TERM CURRENT USE OF INSULIN (H): ICD-10-CM

## 2021-05-17 DIAGNOSIS — N89.8 VAGINAL DISCHARGE: Primary | ICD-10-CM

## 2021-05-17 LAB
ERYTHROCYTE [DISTWIDTH] IN BLOOD BY AUTOMATED COUNT: 12.5 % (ref 10–15)
HCG UR QL: NEGATIVE
HCT VFR BLD AUTO: 39.1 % (ref 35–47)
HGB BLD-MCNC: 13.3 G/DL (ref 11.7–15.7)
MCH RBC QN AUTO: 29.9 PG (ref 26.5–33)
MCHC RBC AUTO-ENTMCNC: 34 G/DL (ref 31.5–36.5)
MCV RBC AUTO: 88 FL (ref 78–100)
PLATELET # BLD AUTO: 326 10E9/L (ref 150–450)
RBC # BLD AUTO: 4.45 10E12/L (ref 3.8–5.2)
TSH SERPL DL<=0.005 MIU/L-ACNC: 1.24 MU/L (ref 0.4–4)
WBC # BLD AUTO: 8.3 10E9/L (ref 4–11)

## 2021-05-17 PROCEDURE — 84443 ASSAY THYROID STIM HORMONE: CPT | Performed by: FAMILY MEDICINE

## 2021-05-17 PROCEDURE — 99214 OFFICE O/P EST MOD 30 MIN: CPT | Performed by: FAMILY MEDICINE

## 2021-05-17 PROCEDURE — 81025 URINE PREGNANCY TEST: CPT | Performed by: FAMILY MEDICINE

## 2021-05-17 PROCEDURE — 99207 PR NON-BILLABLE SERV PER CHARTING: CPT | Performed by: PHYSICIAN ASSISTANT

## 2021-05-17 PROCEDURE — 36415 COLL VENOUS BLD VENIPUNCTURE: CPT | Performed by: FAMILY MEDICINE

## 2021-05-17 PROCEDURE — 85027 COMPLETE CBC AUTOMATED: CPT | Performed by: FAMILY MEDICINE

## 2021-05-17 NOTE — PROGRESS NOTES
Assessment & Plan     Bev was seen today for abnormal bleeding problem.    Diagnoses and all orders for this visit:    Breakthrough bleeding on birth control pills, likely due to missed pills last month.  Urine Pregnancy Test is negative.  CBC is within normal limits.  TSH is pending.  -     CBC with platelets  -     TSH with free T4 reflex  -     HCG qualitative urine    Type 2 diabetes mellitus without complication, with long-term current use of insulin (H), with last Hemoglobin A1c 5.7.      Discussed risks and benefits of treatment strategies.    Discussed OCP use/directions and backup contraception.    Patient Instructions     We will notify you of your final lab result, as discussed.    Use backup contraception x1 month, as discussed.    Follow-up with primary care if further concerns regarding breakthrough bleeding.    Follow-up immediately if emergent symptoms develop.      Return if symptoms worsen or fail to improve.    Josseline Dallas MD  [facility]        Tyler Pablo is a 21 year old female, who presents to clinic today for the following health issues:    Chief Complaint   Patient presents with     Abnormal Bleeding Problem     Period started 5/1- over 5/5- patient started bleeding again saturday 5/15- started spotting and it has continued going- not normal for patient to be abnormal        HPI    The patient has a known history of type 2 diabetes, on insulin.  She is concerned about irregular menstrual bleeding the past 2-3 weeks.  Patient had her usual menses 5/1/2021 through 5/5/2021.  Vaginal bleeding resumed 2 days ago, 5/15/2021.  Patient is using menstrual cups, without menorrhagia reported.  She states that she missed 3 doses of her Micronor OCP last month.  Patient is engaged to be , not concerned about STDs currently.    Blood sugars have been  mg/dL recently.    Review of Systems   No weight changes or  lightheadedness.  No abdominal pain, except occasional menstrual cramps.  No chest pain or shortness of breath.      Objective    /84   Pulse 80   Temp 99  F (37.2  C) (Tympanic)   Resp 14   SpO2 99%      Initial blood pressure was 173/113.    Physical Exam     GENERAL: healthy, alert and no distress  RESP: lungs clear to auscultation - no rales, rhonchi or wheezes  CV: regular rate and rhythm, normal S1 S2, no S3 or S4, no murmur, click or rub, no peripheral edema and peripheral pulses strong  ABDOMEN: soft, obese, nontender, no hepatosplenomegaly, no masses and bowel sounds normal  MS: no gross musculoskeletal defects noted, no edema  :  Deferred    LABORATORY:  Office Visit on 05/17/2021   Component Date Value Ref Range Status     WBC 05/17/2021 8.3  4.0 - 11.0 10e9/L Final     RBC Count 05/17/2021 4.45  3.8 - 5.2 10e12/L Final     Hemoglobin 05/17/2021 13.3  11.7 - 15.7 g/dL Final     Hematocrit 05/17/2021 39.1  35.0 - 47.0 % Final     MCV 05/17/2021 88  78 - 100 fl Final     MCH 05/17/2021 29.9  26.5 - 33.0 pg Final     MCHC 05/17/2021 34.0  31.5 - 36.5 g/dL Final     RDW 05/17/2021 12.5  10.0 - 15.0 % Final     Platelet Count 05/17/2021 326  150 - 450 10e9/L Final     HCG Qual Urine 05/17/2021 Negative  NEG^Negative Final    Comment: This test is for screening purposes.  Results should be interpreted along with   the clinical picture.  Confirmation testing is available if warranted by   ordering [id number], HCG Quantitative Pregnancy.        TSH is pending.    Hemoglobin A1C (%)   Date Value   03/31/2021 5.7 (H)   11/16/2020 5.6       Labs were pending at the time of patient discharge, but the patient was called with her initial lab results 5/17/2021, as noted in Epic.

## 2021-05-17 NOTE — PATIENT INSTRUCTIONS
Thank you for choosing us for your care. I think an in-clinic visit would be best next steps based on your symptoms. Please schedule a clinic appointment; you won t be charged for this eVisit.      You can schedule an appointment right here in [facility], or call [phone]    Dear Bev Araujo,    We are sorry you are not feeling well. Based on the responses you provided, it is recommended that you be seen in-person in urgent care so we can better evaluate your symptoms. Please click here to find the nearest urgent care location to you.   You will not be charged for this Visit. Thank you for trusting us with your care.    Sissy Kumar PA-C

## 2021-05-18 NOTE — PATIENT INSTRUCTIONS
We will notify you of your final lab result, as discussed.    Use backup contraception x1 month, as discussed.    Follow-up with primary care if further concerns regarding breakthrough bleeding.    Follow-up immediately if emergent symptoms develop.

## 2021-06-28 DIAGNOSIS — E66.01 MORBID OBESITY (H): ICD-10-CM

## 2021-06-28 DIAGNOSIS — E11.9 TYPE 2 DIABETES MELLITUS WITHOUT COMPLICATION, WITH LONG-TERM CURRENT USE OF INSULIN (H): ICD-10-CM

## 2021-06-28 DIAGNOSIS — Z79.4 TYPE 2 DIABETES MELLITUS WITHOUT COMPLICATION, WITH LONG-TERM CURRENT USE OF INSULIN (H): ICD-10-CM

## 2021-06-30 RX ORDER — LIRAGLUTIDE 6 MG/ML
INJECTION SUBCUTANEOUS
Qty: 9 ML | Refills: 3 | OUTPATIENT
Start: 2021-06-30

## 2021-07-01 ENCOUNTER — DOCUMENTATION ONLY (OUTPATIENT)
Dept: LAB | Facility: CLINIC | Age: 21
End: 2021-07-01

## 2021-07-01 DIAGNOSIS — E10.9 TYPE 1 DIABETES MELLITUS WITHOUT COMPLICATION (H): Primary | ICD-10-CM

## 2021-07-01 NOTE — PROGRESS NOTES
Hi, I do see that patient is coming in for labs on 07/05/2021, there are orders but the note is saying that she wants an A1C.  If you can please place an order if that is something you want.    Thanks Aparna DENNISON

## 2021-07-05 DIAGNOSIS — E10.9 TYPE 1 DIABETES MELLITUS WITHOUT COMPLICATION (H): ICD-10-CM

## 2021-07-05 LAB — HBA1C MFR BLD: 5.3 % (ref 0–5.6)

## 2021-07-05 PROCEDURE — 36415 COLL VENOUS BLD VENIPUNCTURE: CPT | Performed by: INTERNAL MEDICINE

## 2021-07-05 PROCEDURE — 83036 HEMOGLOBIN GLYCOSYLATED A1C: CPT | Performed by: INTERNAL MEDICINE

## 2021-07-13 DIAGNOSIS — Z30.09 GENERAL COUNSELING FOR PRESCRIPTION OF ORAL CONTRACEPTIVES: ICD-10-CM

## 2021-07-13 RX ORDER — ACETAMINOPHEN AND CODEINE PHOSPHATE 120; 12 MG/5ML; MG/5ML
0.35 SOLUTION ORAL DAILY
Qty: 90 TABLET | Refills: 0 | Status: SHIPPED | OUTPATIENT
Start: 2021-07-13 | End: 2022-03-25

## 2021-07-13 NOTE — TELEPHONE ENCOUNTER
RN contacted pharmacy to clarify pt medication needing refill which is Micronor.  Will initiate medication refill process.    CAROLINA GrajedaN RN

## 2021-07-13 NOTE — TELEPHONE ENCOUNTER
[facility] Call Center    Phone Message    May a detailed message be left on voicemail: yes     Reason for Call: Pharmacy calling in regards to a refill request. Please advise. Thank you    Action Taken: Message routed to:  [facility] p [phone]    Travel Screening: Not Applicable

## 2021-07-13 NOTE — TELEPHONE ENCOUNTER
"Requested Prescriptions   Pending Prescriptions Disp Refills     norethindrone (MICRONOR) 0.35 MG tablet 90 tablet 3     Sig: Take 1 tablet (0.35 mg) by mouth daily       Contraceptives Protocol Passed - 7/13/2021  2:24 PM        Passed - Patient is not a current smoker if age is 35 or older        Passed - Recent (12 mo) or future (30 days) visit within the authorizing provider's specialty     Patient has had an office visit with the authorizing provider or a provider within the authorizing providers department within the previous 12 mos or has a future within next 30 days. See \"Patient Info\" tab in inbasket, or \"Choose Columns\" in Meds & Orders section of the refill encounter.              Passed - Medication is active on med list        Passed - No active pregnancy on record        Passed - No positive pregnancy test in past 12 months           Last seen 11/10/2020 for pregnancy testing. Last annual exam 8/13/2020. Pt does have next annual exam scheduled for 7/22/21.     Last prescribed 8/13/2020 90 tablets with 3 refills.    Medication is being filled for 1 time refill only due to:  pt coming due for annual exam. appt is scheduled.     CAROLINA GrajedaN RN        "

## 2021-08-06 ENCOUNTER — TELEPHONE (OUTPATIENT)
Dept: ENDOCRINOLOGY | Facility: CLINIC | Age: 21
End: 2021-08-06

## 2021-08-06 NOTE — TELEPHONE ENCOUNTER
This writer attempted to contact Bev on 08/06/21    Reason for call offer virtual and left message to return call.    When patient calls back, please Please offer virtual if patient wants for appointment 8/27/21 Dr Rajan.      [facility] Sanchez [facility]  Adult Endocrinology  [facility]

## 2021-08-25 ENCOUNTER — MYC MEDICAL ADVICE (OUTPATIENT)
Dept: ENDOCRINOLOGY | Facility: CLINIC | Age: 21
End: 2021-08-25

## 2021-08-25 DIAGNOSIS — Z79.4 TYPE 2 DIABETES MELLITUS WITHOUT COMPLICATION, WITH LONG-TERM CURRENT USE OF INSULIN (H): Primary | ICD-10-CM

## 2021-08-25 DIAGNOSIS — E11.9 TYPE 2 DIABETES MELLITUS WITHOUT COMPLICATION, WITH LONG-TERM CURRENT USE OF INSULIN (H): Primary | ICD-10-CM

## 2021-08-25 RX ORDER — FLASH GLUCOSE SENSOR
KIT MISCELLANEOUS
Qty: 2 EACH | Refills: 11 | Status: SHIPPED | OUTPATIENT
Start: 2021-08-25 | End: 2022-02-24 | Stop reason: ALTCHOICE

## 2021-08-25 RX ORDER — FLASH GLUCOSE SCANNING READER
EACH MISCELLANEOUS
Qty: 1 EACH | Refills: 0 | Status: SHIPPED | OUTPATIENT
Start: 2021-08-25 | End: 2022-02-24 | Stop reason: ALTCHOICE

## 2021-08-30 ENCOUNTER — OFFICE VISIT (OUTPATIENT)
Dept: OBGYN | Facility: CLINIC | Age: 21
End: 2021-08-30
Payer: COMMERCIAL

## 2021-08-30 VITALS
BODY MASS INDEX: 43.4 KG/M2 | HEIGHT: 69 IN | OXYGEN SATURATION: 99 % | WEIGHT: 293 LBS | HEART RATE: 84 BPM | DIASTOLIC BLOOD PRESSURE: 95 MMHG | SYSTOLIC BLOOD PRESSURE: 133 MMHG

## 2021-08-30 DIAGNOSIS — Z12.4 SCREENING FOR CERVICAL CANCER: ICD-10-CM

## 2021-08-30 DIAGNOSIS — Z30.09 GENERAL COUNSELING FOR PRESCRIPTION OF ORAL CONTRACEPTIVES: ICD-10-CM

## 2021-08-30 DIAGNOSIS — Z11.3 SCREEN FOR STD (SEXUALLY TRANSMITTED DISEASE): ICD-10-CM

## 2021-08-30 DIAGNOSIS — Z00.00 ANNUAL PHYSICAL EXAM: Primary | ICD-10-CM

## 2021-08-30 DIAGNOSIS — Z00.00 ROUTINE GENERAL MEDICAL EXAMINATION AT A HEALTH CARE FACILITY: ICD-10-CM

## 2021-08-30 LAB
CLUE CELLS: ABNORMAL
HBV SURFACE AG SERPL QL IA: NONREACTIVE
HIV 1+2 AB+HIV1 P24 AG SERPL QL IA: NONREACTIVE
T PALLIDUM AB SER QL: NONREACTIVE
TRICHOMONAS, WET PREP: ABNORMAL
WBC'S/HIGH POWER FIELD, WET PREP: ABNORMAL
YEAST, WET PREP: ABNORMAL

## 2021-08-30 PROCEDURE — 87210 SMEAR WET MOUNT SALINE/INK: CPT | Performed by: OBSTETRICS & GYNECOLOGY

## 2021-08-30 PROCEDURE — 87340 HEPATITIS B SURFACE AG IA: CPT | Performed by: OBSTETRICS & GYNECOLOGY

## 2021-08-30 PROCEDURE — 99395 PREV VISIT EST AGE 18-39: CPT | Performed by: OBSTETRICS & GYNECOLOGY

## 2021-08-30 PROCEDURE — 36415 COLL VENOUS BLD VENIPUNCTURE: CPT | Performed by: OBSTETRICS & GYNECOLOGY

## 2021-08-30 PROCEDURE — 87491 CHLMYD TRACH DNA AMP PROBE: CPT | Performed by: OBSTETRICS & GYNECOLOGY

## 2021-08-30 PROCEDURE — 87389 HIV-1 AG W/HIV-1&-2 AB AG IA: CPT | Performed by: OBSTETRICS & GYNECOLOGY

## 2021-08-30 PROCEDURE — 86780 TREPONEMA PALLIDUM: CPT | Performed by: OBSTETRICS & GYNECOLOGY

## 2021-08-30 PROCEDURE — 87591 N.GONORRHOEAE DNA AMP PROB: CPT | Performed by: OBSTETRICS & GYNECOLOGY

## 2021-08-30 RX ORDER — ACETAMINOPHEN AND CODEINE PHOSPHATE 120; 12 MG/5ML; MG/5ML
0.35 SOLUTION ORAL DAILY
Qty: 84 TABLET | Refills: 3 | Status: SHIPPED | OUTPATIENT
Start: 2021-08-30 | End: 2022-09-06

## 2021-08-30 ASSESSMENT — MIFFLIN-ST. JEOR: SCORE: 2247.07

## 2021-08-30 ASSESSMENT — PAIN SCALES - GENERAL: PAINLEVEL: NO PAIN (0)

## 2021-08-30 NOTE — PROGRESS NOTES
Bev is a 21 year old female, , who is here for her annual exam and contraceptive consult.  She currently has been taking Micronor but admits to forgetting to take them daily so would like to know her options.  However, due to ongoing untreated hypertension, she is not a candidate for estrogen-containing medications so her choices are more limited.      ROS: Ten point review of systems was reviewed and negative except the above.    Health Maintenance   Topic Date Due     DIABETIC FOOT EXAM  Never done     ADVANCE CARE PLANNING  Never done     HEPATITIS C SCREENING  Never done     MICROALBUMIN  10/03/2019     PHQ-2  2021     LIPID  2021     CHLAMYDIA SCREENING  2021     PREVENTIVE CARE VISIT  2021     INFLUENZA VACCINE (1) 2021     A1C  10/05/2021     BMP  2021     EYE EXAM  2022     PAP  2022     DTAP/TDAP/TD IMMUNIZATION (5 - Td or Tdap) 2030     Pneumococcal Vaccine: Pediatrics (0 to 5 Years) and At-Risk Patients (6 to 64 Years) (2 of 2 - PPSV23) 2063     HIV SCREENING  Completed     IPV IMMUNIZATION  Completed     HPV IMMUNIZATION  Completed     HEPATITIS B IMMUNIZATION  Completed     COVID-19 Vaccine  Completed     MENINGITIS IMMUNIZATION  Aged Out      Last pap: 17 so due in 2022  Last Mammogram: not due  Last Dexa: not due  Last Colonoscopy: not due  Lab Results   Component Value Date    CHOL 196 2020     Lab Results   Component Value Date    HDL 40 2020     Lab Results   Component Value Date     2020     Lab Results   Component Value Date    TRIG 250 2020     No results found for: CHOLHDLRATIO    OBHX:      PSH: History reviewed. No pertinent surgical history.    PMH: Her past medical, surgical, and obstetric histories were reviewed and are documented in their appropriate chart areas.    ALL/Meds: Her medication and allergy histories were reviewed and are documented in their appropriate chart  "areas.    SH/FMH: Her social and family history was reviewed and documented in its appropriate chart area.    PE: BP (!) 133/95 (BP Location: Right arm, Patient Position: Chair, Cuff Size: Adult Large)   Pulse 84   Ht 1.74 m (5' 8.5\")   Wt 143.6 kg (316 lb 8 oz)   LMP 08/13/2021 (Approximate)   SpO2 99%   Breastfeeding No   BMI 47.42 kg/m    Body mass index is 47.42 kg/m .    General Appearance:  healthy, alert, active, no distress  Cardiovascular:  Regular rate and Rhythm without murmur  Neck: Supple, no adenopathy and thyroid normal  Lungs:  Clear, without wheeze, rale or rhonchi  Breast: normal breast exam  Abdomen: Benign, Soft, flat, non-tender, No masses, organomegaly, No inguinal nodes and Bowel sounds normoactive.   Pelvic:       - Ext: Vulva and perineum are normal without lesion, mass or discharge        - Urethra: normal without discharge        - Urethral Meatus: normal appearance       - Bladder: no tenderness, no masses       - Vagina: Normal mucosa, no discharge        - Cervix: normal and nulliparous       - Uterus:Normal shape, position and consistency        - Adnexa: Normal without masses or tenderness       - Rectal: deferred    A/P:  Well Woman Exam, Contraceptive Consult, STD Screening     -  I discussed the new pap recommendations regarding screening.  Explained the rationale for increased intervals between paps.  Questions asked and answered.  She does agree to this regiment.  Pap was not collected since not due until next year (6/2022)   -  BC: After discussing her options, the patient preferred to continue use of Micronor since she is afraid of weight gain with Depo Provera, and declines anything inserted internally like Nexplanon or an IUD.  She asked for a printed copy of Micronor since she just received a refill extension.   -  She requested STD screening so these orders were placed and cultures were collected.  Safe sex measures were discussed and she denied any known exposure.   " -  She is to f/u with her PCP for ongoing elevated BP readings.  Her repeat BP check today was 136/95.   -  Encouraged self-breast exam   -  Encouraged low fat diet, regular exercise, and adequate calcium intake.    Linnea Mars DO  FACOG, FACS

## 2021-08-30 NOTE — PATIENT INSTRUCTIONS
Preventive Health Recommendations  Female Ages 21 to 25     Yearly exam:     See your health care provider every year in order to  o Review health changes.   o Discuss preventive care.    o Review your medicines if your doctor has prescribed any.      You should be tested each year for STDs (sexually transmitted diseases).       Talk to your provider about how often you should have cholesterol testing.      Get a Pap test every three years. If you have an abnormal result, your doctor may have you test more often.      If you are at risk for diabetes, you should have a diabetes test (fasting glucose).     Shots:     Get a flu shot each year.     Get a tetanus shot every 10 years.     Consider getting the shot (vaccine) that prevents cervical cancer (Gardasil).    Nutrition:     Eat at least 5 servings of fruits and vegetables each day.    Eat whole-grain bread, whole-wheat pasta and brown rice instead of white grains and rice.    Get adequate Calcium and Vitamin D.     Lifestyle    Exercise at least 150 minutes a week each week (30 minutes a day, 5 days a week). This will help you control your weight and prevent disease.    Limit alcohol to one drink per day.    No smoking.     Wear sunscreen to prevent skin cancer.    See your dentist every six months for an exam and cleaning.                                                           If you have any questions regarding your visit, Please contact your care team.    [facility]: [phone]      [facility] CLINIC HOURS TELEPHONE NUMBER   Linnea Rayaddam .    Natalia -  Maryana -     DESTIN Rodriguez RN     Monday, Wednesday, Thursday and Friday, [place]  8:30a.m-5:00 p.m   [facility]  [address] [place] [address]  [phone] ask for [facility]    Imaging [phone]       Urgent Care locations:    [facility] Saturday and Sunday   9 am - 5  pm    Monday-Friday   11 pm - 7 pm  Saturday and Sunday   9 am - 5 pm   [phone]     [facility]:  [phone]    If you need a medication refill, please contact your pharmacy. Please allow 3 business days for your refill to be completed.  As always, Thank you for trusting us with your healthcare needs!

## 2021-08-31 LAB
C TRACH DNA SPEC QL NAA+PROBE: NEGATIVE
N GONORRHOEA DNA SPEC QL NAA+PROBE: NEGATIVE

## 2021-09-04 ENCOUNTER — HEALTH MAINTENANCE LETTER (OUTPATIENT)
Age: 21
End: 2021-09-04

## 2021-09-26 DIAGNOSIS — E66.01 MORBID OBESITY (H): ICD-10-CM

## 2021-09-26 DIAGNOSIS — E11.9 TYPE 2 DIABETES MELLITUS WITHOUT COMPLICATION, WITH LONG-TERM CURRENT USE OF INSULIN (H): ICD-10-CM

## 2021-09-26 DIAGNOSIS — Z79.4 TYPE 2 DIABETES MELLITUS WITHOUT COMPLICATION, WITH LONG-TERM CURRENT USE OF INSULIN (H): ICD-10-CM

## 2021-09-27 ENCOUNTER — MYC MEDICAL ADVICE (OUTPATIENT)
Dept: ENDOCRINOLOGY | Facility: CLINIC | Age: 21
End: 2021-09-27

## 2021-09-27 DIAGNOSIS — E11.9 TYPE 2 DIABETES MELLITUS WITHOUT COMPLICATION, WITH LONG-TERM CURRENT USE OF INSULIN (H): ICD-10-CM

## 2021-09-27 DIAGNOSIS — Z79.4 TYPE 2 DIABETES MELLITUS WITHOUT COMPLICATION, WITH LONG-TERM CURRENT USE OF INSULIN (H): ICD-10-CM

## 2021-09-27 DIAGNOSIS — E66.01 MORBID OBESITY (H): ICD-10-CM

## 2021-09-27 RX ORDER — LIRAGLUTIDE 6 MG/ML
INJECTION SUBCUTANEOUS
Qty: 9 ML | Refills: 2 | Status: SHIPPED | OUTPATIENT
Start: 2021-09-27 | End: 2021-12-23

## 2021-09-28 RX ORDER — LIRAGLUTIDE 6 MG/ML
INJECTION SUBCUTANEOUS
Qty: 9 ML | Refills: 2 | OUTPATIENT
Start: 2021-09-28

## 2021-09-28 NOTE — TELEPHONE ENCOUNTER
Duplicate: source prescription ordered 9/27/21 sent to [id number] - [place], [place] - [address] AT [facility]

## 2021-10-28 ENCOUNTER — OFFICE VISIT (OUTPATIENT)
Dept: URGENT CARE | Facility: URGENT CARE | Age: 21
End: 2021-10-28
Payer: COMMERCIAL

## 2021-10-28 ENCOUNTER — ANCILLARY PROCEDURE (OUTPATIENT)
Dept: GENERAL RADIOLOGY | Facility: CLINIC | Age: 21
End: 2021-10-28
Attending: PHYSICIAN ASSISTANT
Payer: COMMERCIAL

## 2021-10-28 VITALS
RESPIRATION RATE: 20 BRPM | TEMPERATURE: 98.5 F | SYSTOLIC BLOOD PRESSURE: 151 MMHG | OXYGEN SATURATION: 100 % | BODY MASS INDEX: 47.02 KG/M2 | DIASTOLIC BLOOD PRESSURE: 100 MMHG | HEART RATE: 76 BPM | WEIGHT: 293 LBS

## 2021-10-28 DIAGNOSIS — S90.852A FOREIGN BODY IN LEFT FOOT, INITIAL ENCOUNTER: ICD-10-CM

## 2021-10-28 DIAGNOSIS — Z79.4 TYPE 2 DIABETES MELLITUS WITHOUT COMPLICATION, WITH LONG-TERM CURRENT USE OF INSULIN (H): ICD-10-CM

## 2021-10-28 DIAGNOSIS — S90.852A FOREIGN BODY IN LEFT FOOT, INITIAL ENCOUNTER: Primary | ICD-10-CM

## 2021-10-28 DIAGNOSIS — E11.9 TYPE 2 DIABETES MELLITUS WITHOUT COMPLICATION, WITH LONG-TERM CURRENT USE OF INSULIN (H): ICD-10-CM

## 2021-10-28 PROCEDURE — 99213 OFFICE O/P EST LOW 20 MIN: CPT | Performed by: PHYSICIAN ASSISTANT

## 2021-10-28 PROCEDURE — 73630 X-RAY EXAM OF FOOT: CPT | Mod: LT | Performed by: RADIOLOGY

## 2021-10-28 ASSESSMENT — ENCOUNTER SYMPTOMS
DIARRHEA: 0
MUSCULOSKELETAL NEGATIVE: 1
BACK PAIN: 0
SHORTNESS OF BREATH: 0
NECK STIFFNESS: 0
RHINORRHEA: 0
CHILLS: 0
WOUND: 1
NAUSEA: 0
ARTHRALGIAS: 0
FEVER: 0
HEADACHES: 0
LIGHT-HEADEDNESS: 0
MYALGIAS: 0
VOMITING: 0
COUGH: 0
WEAKNESS: 0
EYES NEGATIVE: 1
JOINT SWELLING: 0
SORE THROAT: 0
PALPITATIONS: 0
CARDIOVASCULAR NEGATIVE: 1
ENDOCRINE NEGATIVE: 1
ALLERGIC/IMMUNOLOGIC NEGATIVE: 1
NECK PAIN: 0
RESPIRATORY NEGATIVE: 1
DIZZINESS: 0

## 2021-10-28 NOTE — PATIENT INSTRUCTIONS
Patient Education     Foreign Object Under the Skin (Not Removed)  Very small particles that remain under the skin usually don t cause problems or need further treatment. But sometimes they can cause an infection. Sometimes they work their way to the surface on their own without any problems. If you see this happening, you can remove any particles with tweezers. Be careful not to dig up the skin and make things worse.   You may need to see a surgeon if the object is large and couldn't be removed.. The surgeon can assess the injury and treat it. Sometimes a surgeon uses X-rays or ultrasound to guide them in removing the object.   Home care  Wound care    Keep the wound clean and dry.    If there is a dressing or bandage, change it when it gets wet or dirty. Otherwise, leave it on for the first 24 hours, then change it once a day or as often as you were instructed.    If stitches or staples were used, clean the wound every day:  ? After taking off the dressing, wash the area gently with soap and water.  ? Apply a thin layer of antibiotic ointment to the cut. This will keep the wound clean and make it easier to remove the stitches. If it is oozing a lot, you can put a nonstick dressing over it. Then reapply the bandage or dressing as you were instructed.  ? You can get it wet, just like when you clean it. This means you can shower as usual for the first 24 hours. But don't soak the area in water (no baths or swimming) until the stitches or staples are taken out.    If surgical tape or strips were used, keep the area clean and dry. If it becomes wet, blot it dry with a towel.  Medicine    You can take over-the-counter medicine for pain, unless you were given a different pain medicine to use. Talk with your healthcare provider before using these medicines if you have chronic liver or kidney disease, ever had a stomach ulcer or digestive bleeding, or are taking blood-thinner medicines.    If you were given antibiotics,  take them until they are used up. It's important to finish the antibiotics even if the wound looks better to make sure the infection clears.    Follow-up care  Follow up your healthcare provider, or as advised. Keep in mind the following:     Watch for any signs of infection, such as increasing pain, redness, swelling, or pus drainage. If this happens, don t wait for your scheduled visit. See your healthcare provider sooner.    Stitches or staples are usually taken out within 5 to 14 days. This varies depending on what part of your body they are on, and the type of wound. The healthcare provider will tell you how long they should be left in.    If surgical tape or strips were used, they are usually left on for 7 to 10 days. You can remove them after that unless you were told otherwise. If you try to remove them, and it is too difficult, soaking can help. If the edges of the cut pull apart, then stop removing the tape, and follow up with your healthcare provider.    If X-rays were taken, you will be told if there are new findings that may affect your care.  When to seek medical advice  Call your healthcare provider right away if any of these occur:     Fever of 100.4 F (38 C) or higher, or as directed by your healthcare provider    Increasing pain in the wound    Redness, swelling or pus coming from the wound  SpinNote last reviewed this educational content on 8/1/2019 2000-2021 [employer]. All rights reserved. This information is not intended as a substitute for professional medical care. Always follow your healthcare professional's instructions.

## 2021-10-28 NOTE — PROGRESS NOTES
Chief Complaint:    Chief Complaint   Patient presents with     Foreign Body in Skin     sliver in left bottom of foot near the ball-since Friday         Medical Decision Making:    Vital signs reviewed by Alessandro Mckay PA-C  BP (!) 151/100 (BP Location: Left arm, Patient Position: Sitting, Cuff Size: Adult Large)   Pulse 76   Temp 98.5  F (36.9  C) (Tympanic)   Resp 20   Wt 142.3 kg (313 lb 12.8 oz)   SpO2 100%   BMI 47.02 kg/m      Differential Diagnosis:  Foreign body.     ASSESSMENT:     1. Foreign body in left foot, initial encounter    2. Type 2 diabetes mellitus without complication, with long-term current use of insulin (H)           PLAN:     XR of the L foot was negative for any acute foreign body.  No visible foreign body or puncture wound visible at this time.  With Hx of DM 2, and increased risk of infection, order placed for her to follow up with podiatry for further evaluation.  Patient instructed to monitor blood sugars closely with injury and follow up with PCP for any DM medication changes if needed.  Worrisome symptoms discussed with instructions to go to the ED.  Patient verbalized understanding and agreed with this plan.    Labs:     No results found for any visits on 10/28/21.    Current Meds:    Current Outpatient Medications:      blood glucose (NO BRAND SPECIFIED) lancets standard, Use to test blood sugar 2 times daily or as directed., Disp: 100 each, Rfl: 1     blood glucose monitoring (NO BRAND SPECIFIED) meter device kit, Use to test blood sugar 2 times daily or as directed., Disp: 1 kit, Rfl: 0     Continuous Blood Gluc  (FREESTYLE ANNE MARIE 14 DAY READER) JOSESITO, Use to read blood sugars as per 's instructions., Disp: 1 each, Rfl: 0     Continuous Blood Gluc Sensor (FREESTYLE ANNE MARIE 14 DAY SENSOR) MISC, Change every 14 days., Disp: 2 each, Rfl: 11     hydrOXYzine (ATARAX) 25 MG tablet, Take 25 mg by mouth every 4 hours as needed , Disp: , Rfl:      liraglutide  (VICTOZA PEN) 18 MG/3ML solution, ADMINISTER 1.8 MG UNDER THE SKIN DAILY, Disp: 9 mL, Rfl: 2     naproxen (NAPROSYN DR) 500 MG EC tablet, Take 500 mg by mouth 2 times daily as needed (pain), Disp: 60 tablet, Rfl: 0     norethindrone (MICRONOR) 0.35 MG tablet, Take 1 tablet (0.35 mg) by mouth daily, Disp: 84 tablet, Rfl: 3     norethindrone (MICRONOR) 0.35 MG tablet, Take 1 tablet (0.35 mg) by mouth daily, Disp: 90 tablet, Rfl: 0     ONETOUCH ULTRA test strip, USE TO TEST BLOOD SUGARS TWICE DAILY OR AS DIRECTED, Disp: 100 strip, Rfl: 11    Allergies:  No Known Allergies    SUBJECTIVE    HPI: Bev Araujo is an 21 year old female who presents for evaluation and treatment of possible foreign body in the L foot.  Patient states that she thinks she stepped on something 6 days ago.  She does not know what she stepped on.  She feels pain in the midfoot area when she walks and has a sensation of a foreign body.  She denies any redness, or swelling of the foot.     ROS:      Review of Systems   Constitutional: Negative for chills and fever.   HENT: Negative for congestion, ear pain, rhinorrhea and sore throat.    Eyes: Negative.    Respiratory: Negative.  Negative for cough and shortness of breath.    Cardiovascular: Negative.  Negative for chest pain and palpitations.   Gastrointestinal: Negative for diarrhea, nausea and vomiting.   Endocrine: Negative.    Genitourinary: Negative.    Musculoskeletal: Negative.  Negative for arthralgias, back pain, joint swelling, myalgias, neck pain and neck stiffness.   Skin: Positive for wound. Negative for rash.   Allergic/Immunologic: Negative.  Negative for immunocompromised state.   Neurological: Negative for dizziness, weakness, light-headedness and headaches.        Family History   Family History   Problem Relation Age of Onset     Hypertension Mother      Glaucoma Mother      Diabetes Maternal Grandfather      Diabetes Paternal Grandmother      Hypertension  Paternal Grandmother      Diabetes Brother      Glaucoma Brother      Heart Disease Father      Other Cancer Maternal Grandmother         ovarian     Cancer Maternal Grandmother      Glaucoma Maternal Grandmother      Macular Degeneration Maternal Grandmother        Social History  Social History     Socioeconomic History     Marital status: Single     Spouse name: Not on file     Number of children: Not on file     Years of education: Not on file     Highest education level: Not on file   Occupational History     Not on file   Tobacco Use     Smoking status: Never Smoker     Smokeless tobacco: Never Used     Tobacco comment: Dad smokes outside   Vaping Use     Vaping Use: Never used   Substance and Sexual Activity     Alcohol use: Yes     Comment: rare     Drug use: No     Sexual activity: Yes     Partners: Male     Birth control/protection: Condom, Pill     Comment: never   Other Topics Concern     Parent/sibling w/ CABG, MI or angioplasty before 65F 55M? Not Asked   Social History Narrative     Not on file     Social Determinants of Health     Financial Resource Strain:      Difficulty of Paying Living Expenses:    Food Insecurity:      Worried About Running Out of Food in the Last Year:      Ran Out of Food in the Last Year:    Transportation Needs:      Lack of Transportation (Medical):      Lack of Transportation (Non-Medical):    Physical Activity:      Days of Exercise per Week:      Minutes of Exercise per Session:    Stress:      Feeling of Stress :    Social Connections:      Frequency of Communication with Friends and Family:      Frequency of Social Gatherings with Friends and Family:      Attends [employer] Services:      Active Member of Clubs or Organizations:      Attends Club or Organization Meetings:      Marital Status:    Intimate Partner Violence:      Fear of Current or Ex-Partner:      Emotionally Abused:      Physically Abused:      Sexually Abused:         Surgical History:  No past surgical  history on file.     Problem List:  Patient Active Problem List   Diagnosis     Morbid obesity (H)     Attention deficit disorder     Right-sided low back pain without sciatica, unspecified chronicity     Elevated BP without diagnosis of hypertension     Elevated fasting blood sugar     Type 2 diabetes mellitus without complication, with long-term current use of insulin (H)     Morbid obesity with BMI of 45.0-49.9, adult (H)           OBJECTIVE:     Vital signs noted and reviewed by Alessandro Mckay PA-C  BP (!) 151/100 (BP Location: Left arm, Patient Position: Sitting, Cuff Size: Adult Large)   Pulse 76   Temp 98.5  F (36.9  C) (Tympanic)   Resp 20   Wt 142.3 kg (313 lb 12.8 oz)   SpO2 100%   BMI 47.02 kg/m       PEFR:    Physical Exam  Vitals and nursing note reviewed.   Constitutional:       General: She is not in acute distress.     Appearance: She is well-developed. She is not ill-appearing, toxic-appearing or diaphoretic.   HENT:      Head: Normocephalic and atraumatic.      Right Ear: Tympanic membrane and external ear normal. No drainage, swelling or tenderness. Tympanic membrane is not perforated, erythematous, retracted or bulging.      Left Ear: Tympanic membrane and external ear normal. No drainage, swelling or tenderness. Tympanic membrane is not perforated, erythematous, retracted or bulging.      Nose: No mucosal edema, congestion or rhinorrhea.      Right Sinus: No maxillary sinus tenderness or frontal sinus tenderness.      Left Sinus: No maxillary sinus tenderness or frontal sinus tenderness.      Mouth/Throat:      Pharynx: No pharyngeal swelling, oropharyngeal exudate, posterior oropharyngeal erythema or uvula swelling.      Tonsils: No tonsillar abscesses.   Eyes:      Pupils: Pupils are equal, round, and reactive to light.   Neck:      Trachea: Trachea normal.   Cardiovascular:      Rate and Rhythm: Normal rate and regular rhythm.      Heart sounds: Normal heart sounds, S1 normal and S2  normal. No murmur heard.   No friction rub. No gallop.    Pulmonary:      Effort: Pulmonary effort is normal. No respiratory distress.      Breath sounds: Normal breath sounds. No decreased breath sounds, wheezing, rhonchi or rales.   Abdominal:      General: Bowel sounds are normal. There is no distension.      Palpations: Abdomen is soft. Abdomen is not rigid. There is no mass.      Tenderness: There is no abdominal tenderness. There is no guarding or rebound.   Musculoskeletal:      Cervical back: Full passive range of motion without pain, normal range of motion and neck supple.      Left foot: Normal range of motion and normal capillary refill. Tenderness present. No swelling, deformity, bony tenderness or crepitus. Normal pulse.        Feet:       Comments: No visible puncture wound.  General midfoot area of tenderness.     Lymphadenopathy:      Cervical: No cervical adenopathy.   Skin:     General: Skin is warm and dry.   Neurological:      Mental Status: She is alert and oriented to person, place, and time.      Cranial Nerves: No cranial nerve deficit.      Deep Tendon Reflexes: Reflexes are normal and symmetric.   Psychiatric:         Behavior: Behavior normal. Behavior is cooperative.         Thought Content: Thought content normal.         Judgment: Judgment normal.             Alessandro Mckay PA-C  10/28/2021, 10:37 AM

## 2021-10-30 ENCOUNTER — HEALTH MAINTENANCE LETTER (OUTPATIENT)
Age: 21
End: 2021-10-30

## 2021-11-02 ENCOUNTER — OFFICE VISIT (OUTPATIENT)
Dept: PODIATRY | Facility: CLINIC | Age: 21
End: 2021-11-02
Payer: COMMERCIAL

## 2021-11-02 VITALS — WEIGHT: 293 LBS | OXYGEN SATURATION: 98 % | HEART RATE: 96 BPM | BODY MASS INDEX: 46.45 KG/M2

## 2021-11-02 DIAGNOSIS — S90.852A FOREIGN BODY IN LEFT FOOT, INITIAL ENCOUNTER: Primary | ICD-10-CM

## 2021-11-02 PROCEDURE — 99203 OFFICE O/P NEW LOW 30 MIN: CPT | Performed by: PODIATRIST

## 2021-11-02 NOTE — PATIENT INSTRUCTIONS
We wish you continued good healing. If you have any questions or concerns, please do not hesitate to contact us at  [phone]    [facility] (secure e-mail communication and access to your chart) to send a message or to make an appointment.    Please remember to call and schedule a follow up appointment if one was recommended at your earliest convenience.     PODIATRY CLINIC HOURS  TELEPHONE NUMBER    Dr. Uche REAVESPMARIANNA [facility]        Clinics:  [place] CMA   Tuesday 1PM-6PM  [place]  Wednesday 745AM-330PM  [place]  Thursday/Friday 745AM-230PM  [place] APPOINTMENTS  [phone]    [place] APPOINTMENTS  [phone]          If you need a medication refill, please contact us you may need lab work and/or a follow up visit prior to your refill (i.e. Antifungal medications).    If MRI needed please call Imaging at [phone] or [phone]    HOW DO I GET MY KNEE SCOOTER? Knee scooters can be picked up at ANY Medical Supply stores with your knee scooter Prescription.  OR    Bring your signed prescription to an [facility] Medical Equipment showroom.

## 2021-11-02 NOTE — LETTER
11/2/2021         RE: Bev Araujo  [address] [place] [address]        Dear Colleague,    Thank you for referring your patient, Bev Araujo, to the [facility]. Please see a copy of my visit note below.    S:  Patient seen today in consult from Alessandro Mckay and complains of left foot pain.  Points to plantar central foot.  Describes as a burning pain.  aggravated by activity and relieved by rest.  Broke glass in house.  Then felt something walking barefoot in  House.  No bleeding or drainage ever.  Works on feet part time.  Has diabetes.       See above     No Known Allergies    Current Outpatient Medications   Medication Sig Dispense Refill     blood glucose (NO BRAND SPECIFIED) lancets standard Use to test blood sugar 2 times daily or as directed. 100 each 1     blood glucose monitoring (NO BRAND SPECIFIED) meter device kit Use to test blood sugar 2 times daily or as directed. 1 kit 0     Continuous Blood Gluc  (FREESTYLE ANNE MARIE 14 DAY READER) JOSESITO Use to read blood sugars as per 's instructions. 1 each 0     Continuous Blood Gluc Sensor (FREESTYLE ANNE MARIE 14 DAY SENSOR) MISC Change every 14 days. 2 each 11     hydrOXYzine (ATARAX) 25 MG tablet Take 25 mg by mouth every 4 hours as needed        liraglutide (VICTOZA PEN) 18 MG/3ML solution ADMINISTER 1.8 MG UNDER THE SKIN DAILY 9 mL 2     naproxen (NAPROSYN DR) 500 MG EC tablet Take 500 mg by mouth 2 times daily as needed (pain) 60 tablet 0     norethindrone (MICRONOR) 0.35 MG tablet Take 1 tablet (0.35 mg) by mouth daily 84 tablet 3     norethindrone (MICRONOR) 0.35 MG tablet Take 1 tablet (0.35 mg) by mouth daily 90 tablet 0     ONETOUCH ULTRA test strip USE TO TEST BLOOD SUGARS TWICE DAILY OR AS DIRECTED 100 strip 11       Patient Active Problem List   Diagnosis     Morbid obesity (H)     Attention deficit disorder     Right-sided low back pain without sciatica,  unspecified chronicity     Elevated BP without diagnosis of hypertension     Elevated fasting blood sugar     Type 2 diabetes mellitus without complication, with long-term current use of insulin (H)     Morbid obesity with BMI of 45.0-49.9, adult (H)       Past Medical History:   Diagnosis Date     Diabetes (H)     prediabetic       No past surgical history on file.    Family History   Problem Relation Age of Onset     Hypertension Mother      Glaucoma Mother      Diabetes Maternal Grandfather      Diabetes Paternal Grandmother      Hypertension Paternal Grandmother      Diabetes Brother      Glaucoma Brother      Heart Disease Father      Other Cancer Maternal Grandmother         ovarian     Cancer Maternal Grandmother      Glaucoma Maternal Grandmother      Macular Degeneration Maternal Grandmother        Social History     Tobacco Use     Smoking status: Never Smoker     Smokeless tobacco: Never Used     Tobacco comment: Dad smokes outside   Substance Use Topics     Alcohol use: Yes     Comment: rare         O: Pulse 96   Wt 140.6 kg (310 lb)   SpO2 98%   BMI 46.45 kg/m  .      Constitutional/ general:  Pt is in no apparent distress, appears well-nourished.  Cooperative with history and physical exam.     Psych:  The patient answered questions appropriately.  Normal affect.  Seems to have reasonable expectations, in terms of treatment.     Lungs:  Non labored breathing, non labored speech. No cough.  No audible wheezing. Even, quiet breathing.       Vascular:  Pedal pulses are palpable bilaterally for both the DP and PT arteries.  CFT < 3 sec.  No edema.  Pedal hair growth noted.     Neuro:  Alert and oriented x 3. Coordinated gait.  Light touch sensation is intact to the L4, L5, S1 distributions. No obvious deficits.  No evidence of neurological-based weakness, spasticity, or contracture in the lower extremities.     Musculoskeletal:    Lower extremity muscle strength is normal.  Patient is ambulatory without  an assistive device or brace .  No gross deformities.  Pronated.    Derm: Normal texture and turgor.  No erythema, ecchymosis, or cyanosis.  Hair growth noted.  Pain under left foot plantar central medial.  There is thick skin here.  With debriding this down we maybe encountered small foreign body.  No sinus tracts, erythema, or edema was noted.  With standing felt better.     Radiographic Exam:  X-Ray Findings: unremarkable     A:  Foreign body left foot     P:  Personally reviewed x-rays.  Discussed patient may have foreign body in foot.  Discussed debridement and exploration with patient.  Patient in agreement.  Debrided lesion down and possibly noticed foreign body.  Patient gave verbal consent and this foreign body was removed.  Had patient stand and felt better.  Postopp instructions given.  RETURN TO CLINIC PRN.    Uche Mustafa DPM, FACFAS        Again, thank you for allowing me to participate in the care of your patient.        Sincerely,        Uche Mustafa DPM

## 2021-11-02 NOTE — PROGRESS NOTES
S:  Patient seen today in consult from Alessandro Mckay and complains of left foot pain.  Points to plantar central foot.  Describes as a burning pain.  aggravated by activity and relieved by rest.  Broke glass in house.  Then felt something walking barefoot in  House.  No bleeding or drainage ever.  Works on feet part time.  Has diabetes.       See above     No Known Allergies    Current Outpatient Medications   Medication Sig Dispense Refill     blood glucose (NO BRAND SPECIFIED) lancets standard Use to test blood sugar 2 times daily or as directed. 100 each 1     blood glucose monitoring (NO BRAND SPECIFIED) meter device kit Use to test blood sugar 2 times daily or as directed. 1 kit 0     Continuous Blood Gluc  (FREESTYLE ANNE MARIE 14 DAY READER) JOSESITO Use to read blood sugars as per 's instructions. 1 each 0     Continuous Blood Gluc Sensor (FREESTYLE ANNE MARIE 14 DAY SENSOR) MISC Change every 14 days. 2 each 11     hydrOXYzine (ATARAX) 25 MG tablet Take 25 mg by mouth every 4 hours as needed        liraglutide (VICTOZA PEN) 18 MG/3ML solution ADMINISTER 1.8 MG UNDER THE SKIN DAILY 9 mL 2     naproxen (NAPROSYN DR) 500 MG EC tablet Take 500 mg by mouth 2 times daily as needed (pain) 60 tablet 0     norethindrone (MICRONOR) 0.35 MG tablet Take 1 tablet (0.35 mg) by mouth daily 84 tablet 3     norethindrone (MICRONOR) 0.35 MG tablet Take 1 tablet (0.35 mg) by mouth daily 90 tablet 0     ONETOUCH ULTRA test strip USE TO TEST BLOOD SUGARS TWICE DAILY OR AS DIRECTED 100 strip 11       Patient Active Problem List   Diagnosis     Morbid obesity (H)     Attention deficit disorder     Right-sided low back pain without sciatica, unspecified chronicity     Elevated BP without diagnosis of hypertension     Elevated fasting blood sugar     Type 2 diabetes mellitus without complication, with long-term current use of insulin (H)     Morbid obesity with BMI of 45.0-49.9, adult (H)       Past Medical History:   Diagnosis  Date     Diabetes (H)     prediabetic       No past surgical history on file.    Family History   Problem Relation Age of Onset     Hypertension Mother      Glaucoma Mother      Diabetes Maternal Grandfather      Diabetes Paternal Grandmother      Hypertension Paternal Grandmother      Diabetes Brother      Glaucoma Brother      Heart Disease Father      Other Cancer Maternal Grandmother         ovarian     Cancer Maternal Grandmother      Glaucoma Maternal Grandmother      Macular Degeneration Maternal Grandmother        Social History     Tobacco Use     Smoking status: Never Smoker     Smokeless tobacco: Never Used     Tobacco comment: Dad smokes outside   Substance Use Topics     Alcohol use: Yes     Comment: rare         O: Pulse 96   Wt 140.6 kg (310 lb)   SpO2 98%   BMI 46.45 kg/m  .      Constitutional/ general:  Pt is in no apparent distress, appears well-nourished.  Cooperative with history and physical exam.     Psych:  The patient answered questions appropriately.  Normal affect.  Seems to have reasonable expectations, in terms of treatment.     Lungs:  Non labored breathing, non labored speech. No cough.  No audible wheezing. Even, quiet breathing.       Vascular:  Pedal pulses are palpable bilaterally for both the DP and PT arteries.  CFT < 3 sec.  No edema.  Pedal hair growth noted.     Neuro:  Alert and oriented x 3. Coordinated gait.  Light touch sensation is intact to the L4, L5, S1 distributions. No obvious deficits.  No evidence of neurological-based weakness, spasticity, or contracture in the lower extremities.     Musculoskeletal:    Lower extremity muscle strength is normal.  Patient is ambulatory without an assistive device or brace .  No gross deformities.  Pronated.    Derm: Normal texture and turgor.  No erythema, ecchymosis, or cyanosis.  Hair growth noted.  Pain under left foot plantar central medial.  There is thick skin here.  With debriding this down we maybe encountered small  foreign body.  No sinus tracts, erythema, or edema was noted.  With standing felt better.     Radiographic Exam:  X-Ray Findings: unremarkable     A:  Foreign body left foot     P:  Personally reviewed x-rays.  Discussed patient may have foreign body in foot.  Discussed debridement and exploration with patient.  Patient in agreement.  Debrided lesion down and possibly noticed foreign body.  Patient gave verbal consent and this foreign body was removed.  Had patient stand and felt better.  Postopp instructions given.  RETURN TO CLINIC PRN.    Uche Mustafa DPM, FACFAS

## 2021-11-29 NOTE — PROGRESS NOTES
Bev is a 21 year old who is being evaluated via a billable video visit.      How would you like to obtain your AVS? MyChart  If the video visit is dropped, the invitation should be resent by: Text to cell phone: [phone]  Will anyone else be joining your video visit? No          Outcome for 11/29/21 9:02 AM : sent message for 7 days of glucose data prior to appt  Jada NOVA MA   Adult Endocrine   [facility]    Outcome for 12/03/21 10:41 AM :Glucose data obtained via Phone and Located in Table Below   Stan Sanchez [facility]  Adult Endocrinology  [facility]    12/3/21   10am        108 before eating  12/2/21   10am        125 after eating  12/1/21   11:41am   113 after eating  11/30/21 12:30pm   133 before lunch  12/29/21   9:38am    99 before eating  12/28/21  11:20am   119 after breakfast  12/27/21  12:30pm   128 after lunch  12/26/21   9am         112 before breakfast

## 2021-12-02 ENCOUNTER — DOCUMENTATION ONLY (OUTPATIENT)
Dept: LAB | Facility: CLINIC | Age: 21
End: 2021-12-02

## 2021-12-02 ENCOUNTER — LAB (OUTPATIENT)
Dept: LAB | Facility: CLINIC | Age: 21
End: 2021-12-02
Payer: COMMERCIAL

## 2021-12-02 DIAGNOSIS — E11.9 TYPE 2 DIABETES MELLITUS WITHOUT COMPLICATION, UNSPECIFIED WHETHER LONG TERM INSULIN USE (H): Primary | ICD-10-CM

## 2021-12-02 DIAGNOSIS — E11.9 TYPE 2 DIABETES MELLITUS WITHOUT COMPLICATION, WITHOUT LONG-TERM CURRENT USE OF INSULIN (H): ICD-10-CM

## 2021-12-02 LAB
ALBUMIN SERPL-MCNC: 3.4 G/DL (ref 3.4–5)
ALP SERPL-CCNC: 52 U/L (ref 40–150)
ALT SERPL W P-5'-P-CCNC: 49 U/L (ref 0–50)
ANION GAP SERPL CALCULATED.3IONS-SCNC: 5 MMOL/L (ref 3–14)
AST SERPL W P-5'-P-CCNC: 25 U/L (ref 0–45)
BILIRUB SERPL-MCNC: 0.2 MG/DL (ref 0.2–1.3)
BUN SERPL-MCNC: 9 MG/DL (ref 7–30)
CALCIUM SERPL-MCNC: 8.7 MG/DL (ref 8.5–10.1)
CHLORIDE BLD-SCNC: 107 MMOL/L (ref 94–109)
CO2 SERPL-SCNC: 25 MMOL/L (ref 20–32)
CREAT SERPL-MCNC: 0.61 MG/DL (ref 0.52–1.04)
CREAT UR-MCNC: 148 MG/DL
GFR SERPL CREATININE-BSD FRML MDRD: >90 ML/MIN/1.73M2
GLUCOSE BLD-MCNC: 108 MG/DL (ref 70–99)
HOLD SPECIMEN: NORMAL
MICROALBUMIN UR-MCNC: 104 MG/L
MICROALBUMIN/CREAT UR: 70.27 MG/G CR (ref 0–25)
POTASSIUM BLD-SCNC: 4.2 MMOL/L (ref 3.4–5.3)
PROT SERPL-MCNC: 7.2 G/DL (ref 6.8–8.8)
SODIUM SERPL-SCNC: 137 MMOL/L (ref 133–144)

## 2021-12-02 PROCEDURE — 83036 HEMOGLOBIN GLYCOSYLATED A1C: CPT | Performed by: INTERNAL MEDICINE

## 2021-12-02 PROCEDURE — 80053 COMPREHEN METABOLIC PANEL: CPT

## 2021-12-02 PROCEDURE — 82043 UR ALBUMIN QUANTITATIVE: CPT

## 2021-12-02 PROCEDURE — 36415 COLL VENOUS BLD VENIPUNCTURE: CPT

## 2021-12-02 NOTE — PROGRESS NOTES
Patient was in for lab work 12/2/21. She would like an A1c as well. Extra tube was drawn for this test.    Please review and place future order.    Thank you,  Nicki manzanares

## 2021-12-03 ENCOUNTER — VIRTUAL VISIT (OUTPATIENT)
Dept: ENDOCRINOLOGY | Facility: CLINIC | Age: 21
End: 2021-12-03
Payer: COMMERCIAL

## 2021-12-03 DIAGNOSIS — E11.9 TYPE 2 DIABETES MELLITUS WITHOUT COMPLICATION, UNSPECIFIED WHETHER LONG TERM INSULIN USE (H): Primary | ICD-10-CM

## 2021-12-03 DIAGNOSIS — E66.01 MORBID OBESITY (H): ICD-10-CM

## 2021-12-03 LAB — HBA1C MFR BLD: 5.6 % (ref 0–5.6)

## 2021-12-03 PROCEDURE — 99214 OFFICE O/P EST MOD 30 MIN: CPT | Mod: 95 | Performed by: INTERNAL MEDICINE

## 2021-12-03 ASSESSMENT — ENCOUNTER SYMPTOMS
COUGH DISTURBING SLEEP: 0
SNORES LOUDLY: 0
DYSPNEA ON EXERTION: 0
POSTURAL DYSPNEA: 0
SHORTNESS OF BREATH: 0
HEMOPTYSIS: 0
WHEEZING: 0
SPUTUM PRODUCTION: 1
COUGH: 1

## 2021-12-03 NOTE — PROGRESS NOTES
Video-Visit Details    Type of service:  Video Visit    Start: 09/04/2020 11:09 am   Stop: 09/04/2020 11:30 am     Originating Location (pt. Location): Home    Distant Location (provider location):  [facility]     Platform used for Video Visit: Osmany                                                                                 - Endocrinology follow up -    Reason for visit/consult:  Data Unavailable    Primary care provider: Linnea Mars      Assessment and Plan  21 year old female with DM2, A1C 6.6, and overweight    Given mild DM and obesity, she may have most benefit from GLP1.     - continue Victoza 1.8 mg daily.     - add on Metformin 500 mg BID    Overweight    - referral to weight management clinic for medical therapy    DM complication  Urine microalbumin mildly + since 2018.     Regular menses    RTC with me in 1 year.       35  minutes spent on the date of the encounter doing chart review, history and exam, documentation and further activities as noted above.    Vy Rajan MD  Staff Physician  Endocrinology and Metabolism  [facility]  License: [id number]  Pager: [phone]    Interval History as of 12/3/2021 : Patient has been doing well.. Medication compliance: well toleratign to victosa with full dose over 1 year, she lost 12 lb and rebounded and now total 10 lb loss   . No hirsutism, regular menses .  HPI: A 19 yo female here for the evaluation of her diabetes.   Patient was diagnosed diabetes 2 years ago with elevated A1c 6.5.  She has been trying diet and exercise.   This year she cut down significant amount of soda in which she limit amount of sweets and chocolate.  She rarely eat fast food.  Follow-up A1c in August 2020 was 6.6.  Never tried any medication or insulin.  She has family history of diabetes her older brother has diabetes diagnosed age 15, maternal grandfather has diabetes, paternal grandmother has diabetes, paternal uncle has  diabetes.  Her body weight is 320 pounds and height 5 8, her body weight has been stable for the past few years.   She was also told elevated blood pressure but otherwise no medical history.  Her menarche was age 13-16 menstrual cycle regular taking birth control pill for the past 4 years.     Current Diabetic Regimens:      Life Style: evening shift  Wake up 10 am  Breakfast  Lunch   Dinner  Bedtime    Exercise:  At work lifting patients    DM complications:  Retinopathy:   Nephropathy:   Neuropathy:   Most recent LDL:   LDL Cholesterol Calculated   Date Value Ref Range Status   08/13/2020 106 (H) <100 mg/dL Final     Comment:     Above desirable:  100-129 mg/dl  Borderline High:  130-159 mg/dL  High:             160-189 mg/dL  Very high:       >189 mg/dl         Glucose Log: We downloaded glucometer and analyzed and summarize here.  Am glucose: 108, 113, 133, 99, 119, 128    A1C trends from previous labs:   Hemoglobin A1C   Date Value Ref Range Status   07/05/2021 5.3 0 - 5.6 % Final     Comment:     Normal <5.7% Prediabetes 5.7-6.4%  Diabetes 6.5% or higher - adopted from ADA   consensus guidelines.     03/31/2021 5.7 (H) 0 - 5.6 % Final     Comment:     Normal <5.7% Prediabetes 5.7-6.4%  Diabetes 6.5% or higher - adopted from ADA   consensus guidelines.     11/16/2020 5.6 0 - 5.6 % Final     Comment:     Normal <5.7% Prediabetes 5.7-6.4%  Diabetes 6.5% or higher - adopted from ADA   consensus guidelines.     08/13/2020 6.6 (H) 0 - 5.6 % Final     Comment:     Normal <5.7% Prediabetes 5.7-6.4%  Diabetes 6.5% or higher - adopted from ADA   consensus guidelines.     10/03/2018 6.5 (H) 0 - 5.6 % Final     Comment:     Normal <5.7% Prediabetes 5.7-6.4%  Diabetes 6.5% or higher - adopted from ADA   consensus guidelines.          Past Medical/Surgical History:  Past Medical History:   Diagnosis Date     Diabetes (H)     prediabetic     No past surgical history on file.    Allergies:  No Known Allergies    Current  Medications   Current Outpatient Medications   Medication     blood glucose (NO BRAND SPECIFIED) lancets standard     blood glucose monitoring (NO BRAND SPECIFIED) meter device kit     hydrOXYzine (ATARAX) 25 MG tablet     liraglutide (VICTOZA PEN) 18 MG/3ML solution     naproxen (NAPROSYN DR) 500 MG EC tablet     norethindrone (MICRONOR) 0.35 MG tablet     norethindrone (MICRONOR) 0.35 MG tablet     ONETOUCH ULTRA test strip     Continuous Blood Gluc  (FREESTYLE ANNE MARIE 14 DAY READER) JOSESITO     Continuous Blood Gluc Sensor (FREESTYLE ANNE MARIE 14 DAY SENSOR) [facility]     No current facility-administered medications for this visit.       Family History:  Family History   Problem Relation Age of Onset     Hypertension Mother      Glaucoma Mother      Diabetes Maternal Grandfather      Diabetes Paternal Grandmother      Hypertension Paternal Grandmother      Diabetes Brother      Glaucoma Brother      Heart Disease Father      Other Cancer Maternal Grandmother         ovarian     Cancer Maternal Grandmother      Glaucoma Maternal Grandmother      Macular Degeneration Maternal Grandmother        Social History:  Social History     Tobacco Use     Smoking status: Never Smoker     Smokeless tobacco: Never Used     Tobacco comment: Dad smokes outside   Substance Use Topics     Alcohol use: Yes     Comment: rare   works full time assisted living.     ROS:  Full review of systems taken with the help of the intake sheet. Otherwise a complete 14 point review of systems was taken and is negative unless stated in the history above.      Physical Exam:   Vitals: There were no vitals taken for this visit.  BMI= There is no height or weight on file to calculate BMI.   General: well appearing, no acute distress, pleasant and conversant,   Mental Status/neuro: alert and oriented  Face: symmetrical, normal facial color  Eyes: anicteric, no proptosis or lid lag  Resp: no acute ditress      Labs : I reviewed data from epic and extract and  summarize the pertinent data here.   Lab Results   Component Value Date     10/03/2018      Lab Results   Component Value Date    POTASSIUM 4.1 10/03/2018     Lab Results   Component Value Date    CHLORIDE 110 10/03/2018     Lab Results   Component Value Date    SEGUN 8.8 10/03/2018     Lab Results   Component Value Date    CO2 25 10/03/2018     Lab Results   Component Value Date    BUN 13 10/03/2018     Lab Results   Component Value Date    CR 0.55 10/03/2018     Lab Results   Component Value Date     08/13/2020     Lab Results   Component Value Date    TSH 1.09 08/13/2020     No results found for: T4  Lab Results   Component Value Date    A1C 6.6 08/13/2020       No results found for: IGF1  No results found for: LH  No results found for: FSH  No results found for: ESTROGEN  No results found for: PROLACTIN

## 2021-12-03 NOTE — LETTER
12/3/2021         RE: Bev Araujo  [address] [place] [address]        Dear Colleague,    Thank you for referring your patient, Bev Araujo, to the [facility]. Please see a copy of my visit note below.    Bev is a 21 year old who is being evaluated via a billable video visit.      How would you like to obtain your AVS? MyChart  If the video visit is dropped, the invitation should be resent by: Text to cell phone: [phone]  Will anyone else be joining your video visit? No          Outcome for 11/29/21 9:02 AM : sent message for 7 days of glucose data prior to appt  Jada NOVA MA   Adult Endocrine   [facility]    Outcome for 12/03/21 10:41 AM :Glucose data obtained via Phone and Located in Table Below   [facility]  Adult Endocrinology  [facility]    12/3/21   10am        108 before eating  12/2/21   10am        125 after eating  12/1/21   11:41am   113 after eating  11/30/21 12:30pm   133 before lunch  12/29/21   9:38am    99 before eating  12/28/21  11:20am   119 after breakfast  12/27/21  12:30pm   128 after lunch  12/26/21   9am         112 before breakfast            Video-Visit Details    Type of service:  Video Visit    Start: 09/04/2020 11:09 am   Stop: 09/04/2020 11:30 am     Originating Location (pt. Location): Home    Distant Location (provider location):  [facility]     Platform used for Video Visit: AmWell                                                                                 - Endocrinology follow up -    Reason for visit/consult:  Data Unavailable    Primary care provider: Linnea Mars      Assessment and Plan  21 year old female with DM2, A1C 6.6, and overweight    Given mild DM and obesity, she may have most benefit from GLP1.     - continue Victoza 1.8 mg daily.     - add on Metformin 500 mg BID    Overweight    - referral to  weight management clinic for medical therapy    DM complication  Urine microalbumin mildly + since 2018.     Regular menses    RTC with me in 1 year.       35  minutes spent on the date of the encounter doing chart review, history and exam, documentation and further activities as noted above.    Vy Rajan MD  Staff Physician  Endocrinology and Metabolism  [facility]  License: [place] [address]  Pager: [phone]    Interval History as of 12/3/2021 : Patient has been doing well.. Medication compliance: well toleratign to victosa with full dose over 1 year, she lost 12 lb and rebounded and now total 10 lb loss   . No hirsutism, regular menses .  HPI: A 21 yo female here for the evaluation of her diabetes.   Patient was diagnosed diabetes 2 years ago with elevated A1c 6.5.  She has been trying diet and exercise.   This year she cut down significant amount of soda in which she limit amount of sweets and chocolate.  She rarely eat fast food.  Follow-up A1c in August 2020 was 6.6.  Never tried any medication or insulin.  She has family history of diabetes her older brother has diabetes diagnosed age 15, maternal grandfather has diabetes, paternal grandmother has diabetes, paternal uncle has diabetes.  Her body weight is 320 pounds and height 5 8, her body weight has been stable for the past few years.   She was also told elevated blood pressure but otherwise no medical history.  Her menarche was age 13-16 menstrual cycle regular taking birth control pill for the past 4 years.     Current Diabetic Regimens:      Life Style: evening shift  Wake up 10 am  Breakfast  Lunch   Dinner  Bedtime    Exercise:  At work lifting patients    DM complications:  Retinopathy:   Nephropathy:   Neuropathy:   Most recent LDL:   LDL Cholesterol Calculated   Date Value Ref Range Status   08/13/2020 106 (H) <100 mg/dL Final     Comment:     Above desirable:  100-129 mg/dl  Borderline High:  130-159 mg/dL  High:              160-189 mg/dL  Very high:       >189 mg/dl         Glucose Log: We downloaded glucometer and analyzed and summarize here.  Am glucose: 108, 113, 133, 99, 119, 128    A1C trends from previous labs:   Hemoglobin A1C   Date Value Ref Range Status   07/05/2021 5.3 0 - 5.6 % Final     Comment:     Normal <5.7% Prediabetes 5.7-6.4%  Diabetes 6.5% or higher - adopted from ADA   consensus guidelines.     03/31/2021 5.7 (H) 0 - 5.6 % Final     Comment:     Normal <5.7% Prediabetes 5.7-6.4%  Diabetes 6.5% or higher - adopted from ADA   consensus guidelines.     11/16/2020 5.6 0 - 5.6 % Final     Comment:     Normal <5.7% Prediabetes 5.7-6.4%  Diabetes 6.5% or higher - adopted from ADA   consensus guidelines.     08/13/2020 6.6 (H) 0 - 5.6 % Final     Comment:     Normal <5.7% Prediabetes 5.7-6.4%  Diabetes 6.5% or higher - adopted from ADA   consensus guidelines.     10/03/2018 6.5 (H) 0 - 5.6 % Final     Comment:     Normal <5.7% Prediabetes 5.7-6.4%  Diabetes 6.5% or higher - adopted from ADA   consensus guidelines.          Past Medical/Surgical History:  Past Medical History:   Diagnosis Date     Diabetes (H)     prediabetic     No past surgical history on file.    Allergies:  No Known Allergies    Current Medications   Current Outpatient Medications   Medication     blood glucose (NO BRAND SPECIFIED) lancets standard     blood glucose monitoring (NO BRAND SPECIFIED) meter device kit     hydrOXYzine (ATARAX) 25 MG tablet     liraglutide (VICTOZA PEN) 18 MG/3ML solution     naproxen (NAPROSYN DR) 500 MG EC tablet     norethindrone (MICRONOR) 0.35 MG tablet     norethindrone (MICRONOR) 0.35 MG tablet     ONETOUCH ULTRA test strip     Continuous Blood Gluc  (FREESTYLE ANNE MARIE 14 DAY READER) JOSESITO     Continuous Blood Gluc Sensor (FREESTYLE ANNE MARIE 14 DAY SENSOR) [facility]     No current facility-administered medications for this visit.       Family History:  Family History   Problem Relation Age of Onset     Hypertension  Mother      Glaucoma Mother      Diabetes Maternal Grandfather      Diabetes Paternal Grandmother      Hypertension Paternal Grandmother      Diabetes Brother      Glaucoma Brother      Heart Disease Father      Other Cancer Maternal Grandmother         ovarian     Cancer Maternal Grandmother      Glaucoma Maternal Grandmother      Macular Degeneration Maternal Grandmother        Social History:  Social History     Tobacco Use     Smoking status: Never Smoker     Smokeless tobacco: Never Used     Tobacco comment: Dad smokes outside   Substance Use Topics     Alcohol use: Yes     Comment: rare   works full time assisted living.     ROS:  Full review of systems taken with the help of the intake sheet. Otherwise a complete 14 point review of systems was taken and is negative unless stated in the history above.      Physical Exam:   Vitals: There were no vitals taken for this visit.  BMI= There is no height or weight on file to calculate BMI.   General: well appearing, no acute distress, pleasant and conversant,   Mental Status/neuro: alert and oriented  Face: symmetrical, normal facial color  Eyes: anicteric, no proptosis or lid lag  Resp: no acute ditress      Labs : I reviewed data from epic and extract and summarize the pertinent data here.   Lab Results   Component Value Date     10/03/2018      Lab Results   Component Value Date    POTASSIUM 4.1 10/03/2018     Lab Results   Component Value Date    CHLORIDE 110 10/03/2018     Lab Results   Component Value Date    SEGUN 8.8 10/03/2018     Lab Results   Component Value Date    CO2 25 10/03/2018     Lab Results   Component Value Date    BUN 13 10/03/2018     Lab Results   Component Value Date    CR 0.55 10/03/2018     Lab Results   Component Value Date     08/13/2020     Lab Results   Component Value Date    TSH 1.09 08/13/2020     No results found for: T4  Lab Results   Component Value Date    A1C 6.6 08/13/2020       No results found for: IGF1  No results  found for: LH  No results found for: FSH  No results found for: ESTROGEN  No results found for: PROLACTIN            Again, thank you for allowing me to participate in the care of your patient.        Sincerely,        Vy Rajan MD

## 2021-12-03 NOTE — PATIENT INSTRUCTIONS
[facility]  Willow Zapata RN, Diabetes Educator: [phone]  Clinic Nurses DESTIN Caldera; CMA's: Jada Frederick Yang Mee   Clinic Fax: [phone]  On-Call Endocrine at the [place] (after hours/weekends): [phone] option 4  Scheduling Line: [phone]     Appointment Reminders:  * Please bring meter with for staff to download  * If you are due ONLY for an A1C, it is scheduled with the nurse and will be done in clinic. You do not need to schedule a lab appointment. Fasting is not required for an A1C.  * Refill request should be submitted to your pharmacy. They will contact clinic for approval.

## 2021-12-08 ENCOUNTER — TELEPHONE (OUTPATIENT)
Dept: ENDOCRINOLOGY | Facility: CLINIC | Age: 21
End: 2021-12-08
Payer: COMMERCIAL

## 2021-12-08 NOTE — TELEPHONE ENCOUNTER
12/8 Provided phone number [phone] to schedule an appointment with Dr. Maciel. Provided phone number [phone] to schedule follow up around 12/3/2022 with Dr. Rajan.      Jena almaraz Procedure   Orthopedics, Podiatry, Sports Medicine, ENT/Eye Specialties  [facility]   [phone]

## 2021-12-21 DIAGNOSIS — Z79.4 TYPE 2 DIABETES MELLITUS WITHOUT COMPLICATION, WITH LONG-TERM CURRENT USE OF INSULIN (H): ICD-10-CM

## 2021-12-21 DIAGNOSIS — E66.01 MORBID OBESITY (H): ICD-10-CM

## 2021-12-21 DIAGNOSIS — E11.9 TYPE 2 DIABETES MELLITUS WITHOUT COMPLICATION, WITH LONG-TERM CURRENT USE OF INSULIN (H): ICD-10-CM

## 2021-12-23 RX ORDER — LIRAGLUTIDE 6 MG/ML
INJECTION SUBCUTANEOUS
Qty: 27 ML | Refills: 3 | Status: SHIPPED | OUTPATIENT
Start: 2021-12-23 | End: 2022-12-02

## 2021-12-23 NOTE — TELEPHONE ENCOUNTER
liraglutide (VICTOZA PEN) 18 MG/3ML solution   ADMINISTER 1.8 MG UNDER THE SKIN DAILY     Last Written Prescription Date:  9/27/21  Last Fill Quantity: 9 ml   # refills: 2  Last Office Visit : 12/3/21  continue Victoza 1.8 mg daily.   RTC with me in 1 year.   Future Office visit: none    Refill to pharmacy.

## 2022-02-22 ENCOUNTER — TELEPHONE (OUTPATIENT)
Dept: FAMILY MEDICINE | Facility: CLINIC | Age: 22
End: 2022-02-22
Payer: COMMERCIAL

## 2022-02-24 ENCOUNTER — TELEPHONE (OUTPATIENT)
Dept: ENDOCRINOLOGY | Facility: CLINIC | Age: 22
End: 2022-02-24
Payer: COMMERCIAL

## 2022-02-24 DIAGNOSIS — Z79.4 TYPE 2 DIABETES MELLITUS WITHOUT COMPLICATION, WITH LONG-TERM CURRENT USE OF INSULIN (H): Primary | ICD-10-CM

## 2022-02-24 DIAGNOSIS — E11.9 TYPE 2 DIABETES MELLITUS WITHOUT COMPLICATION, WITH LONG-TERM CURRENT USE OF INSULIN (H): Primary | ICD-10-CM

## 2022-02-24 NOTE — TELEPHONE ENCOUNTER
PA Initiation    Medication: Freestyle Desmond [place] / Koki - [place] Pending  Insurance Company: [employer] - Phone [phone] Fax [phone]  Pharmacy Filling the Rx:    Filling Pharmacy Phone:    Filling Pharmacy Fax:    Start Date: 2/24/2022

## 2022-02-25 ENCOUNTER — VIRTUAL VISIT (OUTPATIENT)
Dept: NUTRITION | Facility: CLINIC | Age: 22
End: 2022-02-25
Payer: COMMERCIAL

## 2022-02-25 DIAGNOSIS — E66.01 MORBID OBESITY (H): ICD-10-CM

## 2022-02-25 PROCEDURE — 99207 PR NO CHARGE LOS: CPT | Performed by: DIETITIAN, REGISTERED

## 2022-02-25 NOTE — PROGRESS NOTES
Medical Nutrition Therapy Consult Late Cancel     Within the week before scheduled consult, I sent a personalized message in Unowhy with nutrition intake questionnaires attached for patient to complete prior to our upcoming consult together. This nutrition information helps the dietitian learn more about the nutrition needs of the patient, troubleshoot the challenges and establish the right goals to set for optimal health and wellness.      At time of the appointment, the diet and nutrition questionnaire was complete and waited for patient on [facility] to show for appointment. Patient did not show so called patient,[phone] and sent [place]well invite patient forgot about appt and was in car picking up prescription and unable to complete initial consult. We reschedule consult for March 23rd at 10am so patient could be better prepared an in an environment where patient was not driving in the car.       Altagracia Yap RD, CLT, LD  Integrative Registered Dietitian

## 2022-03-01 ENCOUNTER — APPOINTMENT (OUTPATIENT)
Dept: URGENT CARE | Facility: CLINIC | Age: 22
End: 2022-03-01
Payer: COMMERCIAL

## 2022-03-03 ENCOUNTER — OFFICE VISIT (OUTPATIENT)
Dept: FAMILY MEDICINE | Facility: CLINIC | Age: 22
End: 2022-03-03
Payer: COMMERCIAL

## 2022-03-03 VITALS
WEIGHT: 293 LBS | DIASTOLIC BLOOD PRESSURE: 89 MMHG | TEMPERATURE: 98.2 F | BODY MASS INDEX: 44.41 KG/M2 | HEIGHT: 68 IN | HEART RATE: 75 BPM | SYSTOLIC BLOOD PRESSURE: 130 MMHG | OXYGEN SATURATION: 97 %

## 2022-03-03 DIAGNOSIS — Z79.4 TYPE 2 DIABETES MELLITUS WITHOUT COMPLICATION, WITH LONG-TERM CURRENT USE OF INSULIN (H): ICD-10-CM

## 2022-03-03 DIAGNOSIS — Z11.1 TUBERCULOSIS SCREENING: ICD-10-CM

## 2022-03-03 DIAGNOSIS — Z00.00 ROUTINE GENERAL MEDICAL EXAMINATION AT A HEALTH CARE FACILITY: Primary | ICD-10-CM

## 2022-03-03 DIAGNOSIS — E11.9 TYPE 2 DIABETES MELLITUS WITHOUT COMPLICATION, WITH LONG-TERM CURRENT USE OF INSULIN (H): ICD-10-CM

## 2022-03-03 DIAGNOSIS — Z12.4 CERVICAL CANCER SCREENING: ICD-10-CM

## 2022-03-03 DIAGNOSIS — Z23 ENCOUNTER FOR IMMUNIZATION: ICD-10-CM

## 2022-03-03 DIAGNOSIS — Z13.6 CARDIOVASCULAR SCREENING; LDL GOAL LESS THAN 100: ICD-10-CM

## 2022-03-03 DIAGNOSIS — E66.01 MORBID OBESITY WITH BMI OF 45.0-49.9, ADULT (H): ICD-10-CM

## 2022-03-03 DIAGNOSIS — Z11.59 NEED FOR HEPATITIS C SCREENING TEST: ICD-10-CM

## 2022-03-03 LAB
ANION GAP SERPL CALCULATED.3IONS-SCNC: 6 MMOL/L (ref 3–14)
BUN SERPL-MCNC: 10 MG/DL (ref 7–30)
CALCIUM SERPL-MCNC: 8.5 MG/DL (ref 8.5–10.1)
CHLORIDE BLD-SCNC: 111 MMOL/L (ref 94–109)
CHOLEST SERPL-MCNC: 138 MG/DL
CO2 SERPL-SCNC: 25 MMOL/L (ref 20–32)
CREAT SERPL-MCNC: 0.54 MG/DL (ref 0.52–1.04)
FASTING STATUS PATIENT QL REPORTED: YES
GFR SERPL CREATININE-BSD FRML MDRD: >90 ML/MIN/1.73M2
GLUCOSE BLD-MCNC: 97 MG/DL (ref 70–99)
HBA1C MFR BLD: 5.4 % (ref 0–5.6)
HDLC SERPL-MCNC: 39 MG/DL
LDLC SERPL CALC-MCNC: 75 MG/DL
NONHDLC SERPL-MCNC: 99 MG/DL
POTASSIUM BLD-SCNC: 3.9 MMOL/L (ref 3.4–5.3)
SODIUM SERPL-SCNC: 142 MMOL/L (ref 133–144)
TRIGL SERPL-MCNC: 119 MG/DL

## 2022-03-03 PROCEDURE — 36415 COLL VENOUS BLD VENIPUNCTURE: CPT | Performed by: FAMILY MEDICINE

## 2022-03-03 PROCEDURE — 80061 LIPID PANEL: CPT | Performed by: FAMILY MEDICINE

## 2022-03-03 PROCEDURE — 80048 BASIC METABOLIC PNL TOTAL CA: CPT | Performed by: FAMILY MEDICINE

## 2022-03-03 PROCEDURE — 0064A COVID-19,PF,MODERNA (18+ YRS BOOSTER .25ML): CPT | Performed by: FAMILY MEDICINE

## 2022-03-03 PROCEDURE — 91306 COVID-19,PF,MODERNA (18+ YRS BOOSTER .25ML): CPT | Performed by: FAMILY MEDICINE

## 2022-03-03 PROCEDURE — 83036 HEMOGLOBIN GLYCOSYLATED A1C: CPT | Performed by: FAMILY MEDICINE

## 2022-03-03 PROCEDURE — 90471 IMMUNIZATION ADMIN: CPT | Performed by: FAMILY MEDICINE

## 2022-03-03 PROCEDURE — 86803 HEPATITIS C AB TEST: CPT | Performed by: FAMILY MEDICINE

## 2022-03-03 PROCEDURE — 86481 TB AG RESPONSE T-CELL SUSP: CPT | Performed by: FAMILY MEDICINE

## 2022-03-03 PROCEDURE — 99395 PREV VISIT EST AGE 18-39: CPT | Mod: 25 | Performed by: FAMILY MEDICINE

## 2022-03-03 PROCEDURE — 90686 IIV4 VACC NO PRSV 0.5 ML IM: CPT | Performed by: FAMILY MEDICINE

## 2022-03-03 ASSESSMENT — ENCOUNTER SYMPTOMS
JOINT SWELLING: 0
DIZZINESS: 0
COUGH: 0
SORE THROAT: 0
CONSTIPATION: 0
HEARTBURN: 0
MYALGIAS: 0
BREAST MASS: 0
DIARRHEA: 0
NAUSEA: 0
FEVER: 0
HEMATURIA: 0
PALPITATIONS: 0
HEADACHES: 0
FREQUENCY: 0
NERVOUS/ANXIOUS: 0
EYE PAIN: 0
CHILLS: 0
ARTHRALGIAS: 0
WEAKNESS: 0
DYSURIA: 0
PARESTHESIAS: 0
ABDOMINAL PAIN: 0
SHORTNESS OF BREATH: 0
HEMATOCHEZIA: 0

## 2022-03-03 NOTE — PROGRESS NOTES
SUBJECTIVE:   CC: Bev Araujo is an 21 year old woman who presents for preventive health visit.     Patient has been advised of split billing requirements and indicates understanding: Yes  Healthy Habits:     Getting at least 3 servings of Calcium per day:  Yes    Bi-annual eye exam:  Yes    Dental care twice a year:  Yes    Sleep apnea or symptoms of sleep apnea:  None    Diet:  Regular (no restrictions)    Frequency of exercise:  1 day/week    Duration of exercise:  30-45 minutes    Taking medications regularly:  Yes    Medication side effects:  None    PHQ-2 Total Score: 2    Additional concerns today:  Yes      Today's PHQ-2 Score:   PHQ-2 ( 1999 Pfizer) 12/3/2021   Q1: Little interest or pleasure in doing things 1   Q2: Feeling down, depressed or hopeless 1   PHQ-2 Score 2   PHQ-2 Total Score (12-17 Years)- Positive if 3 or more points; Administer PHQ-A if positive -       Abuse: Current or Past (Physical, Sexual or Emotional) - No  Do you feel safe in your environment? Yes    Have you ever done Advance Care Planning? (For example, a Health Directive, POLST, or a discussion with a medical provider or your loved ones about your wishes): No, advance care planning information given to patient to review.  Patient declined advance care planning discussion at this time.    Social History     Tobacco Use     Smoking status: Never Smoker     Smokeless tobacco: Never Used     Tobacco comment: Dad smokes outside   Substance Use Topics     Alcohol use: Yes     Comment: rare     If you drink alcohol do you typically have >3 drinks per day or >7 drinks per week? No    No flowsheet data found.No flowsheet data found.    Reviewed orders with patient.  Reviewed health maintenance and updated orders accordingly - Yes  Lab work is in process  Labs reviewed in EPIC  BP Readings from Last 3 Encounters:   03/03/22 130/89   10/28/21 (!) 151/100   08/30/21 (!) 133/95    Wt Readings from Last 3 Encounters:    03/03/22 142.3 kg (313 lb 12.8 oz)   11/02/21 140.6 kg (310 lb)   10/28/21 142.3 kg (313 lb 12.8 oz)                  Patient Active Problem List   Diagnosis     Morbid obesity (H)     Attention deficit disorder     Right-sided low back pain without sciatica, unspecified chronicity     Elevated BP without diagnosis of hypertension     Elevated fasting blood sugar     Type 2 diabetes mellitus without complication, with long-term current use of insulin (H)     Morbid obesity with BMI of 45.0-49.9, adult (H)     History reviewed. No pertinent surgical history.    Social History     Tobacco Use     Smoking status: Never Smoker     Smokeless tobacco: Never Used     Tobacco comment: Dad smokes outside   Substance Use Topics     Alcohol use: Yes     Comment: rare     Family History   Problem Relation Age of Onset     Hypertension Mother      Glaucoma Mother      Diabetes Maternal Grandfather      Diabetes Paternal Grandmother      Hypertension Paternal Grandmother      Diabetes Brother      Glaucoma Brother      Heart Disease Father      Other Cancer Maternal Grandmother         ovarian     Cancer Maternal Grandmother      Glaucoma Maternal Grandmother      Macular Degeneration Maternal Grandmother          Current Outpatient Medications   Medication Sig Dispense Refill     blood glucose (NO BRAND SPECIFIED) lancets standard Use to test blood sugar 2 times daily or as directed. 100 each 1     blood glucose monitoring (NO BRAND SPECIFIED) meter device kit Use to test blood sugar 2 times daily or as directed. 1 kit 0     Continuous Blood Gluc  (FREESTYLE ANNE MARIE 2 READER) JOSESITO 1 each daily Use as directed with Anne Marie continuous glucose system. 1 each 0     Continuous Blood Gluc Sensor (FREESTYLE ANNE MARIE 2 SENSOR) MISC 1 each every 14 days for 14 days Pt requesting Anne Marie 2 continuous glucose system. 6 each 11     hydrOXYzine (ATARAX) 25 MG tablet Take 25 mg by mouth every 4 hours as needed        liraglutide (VICTOZA  PEN) 18 MG/3ML solution ADMINISTER 1.8 MG UNDER THE SKIN DAILY 27 mL 3     metFORMIN (GLUCOPHAGE) 500 MG tablet Take 1 tablet (500 mg) by mouth 2 times daily (with meals) 180 tablet 3     naproxen (NAPROSYN DR) 500 MG EC tablet Take 500 mg by mouth 2 times daily as needed (pain) 60 tablet 0     norethindrone (MICRONOR) 0.35 MG tablet Take 1 tablet (0.35 mg) by mouth daily 84 tablet 3     ONETOUCH ULTRA test strip USE TO TEST BLOOD SUGARS TWICE DAILY OR AS DIRECTED 100 strip 11     norethindrone (MICRONOR) 0.35 MG tablet Take 1 tablet (0.35 mg) by mouth daily 90 tablet 0     No Known Allergies    Breast Cancer Screening:        History of abnormal Pap smear: NO - age 21-29 PAP every 3 years recommended  PAP / HPV 6/20/2019   PAP (Historical) NIL     Reviewed and updated as needed this visit by clinical staff                  Reviewed and updated as needed this visit by Provider                     Review of Systems   Constitutional: Negative for chills and fever.   HENT: Negative for congestion, ear pain, hearing loss and sore throat.    Eyes: Negative for pain and visual disturbance.   Respiratory: Negative for cough and shortness of breath.    Cardiovascular: Negative for chest pain, palpitations and peripheral edema.   Gastrointestinal: Negative for abdominal pain, constipation, diarrhea, heartburn, hematochezia and nausea.   Breasts:  Negative for tenderness, breast mass and discharge.   Genitourinary: Negative for dysuria, frequency, genital sores, hematuria, pelvic pain, urgency, vaginal bleeding and vaginal discharge.   Musculoskeletal: Negative for arthralgias, joint swelling and myalgias.   Skin: Negative for rash.   Neurological: Negative for dizziness, weakness, headaches and paresthesias.   Psychiatric/Behavioral: Negative for mood changes. The patient is not nervous/anxious.      CONSTITUTIONAL: NEGATIVE for fever, chills, change in weight  INTEGUMENTARU/SKIN: NEGATIVE for worrisome rashes, moles or  "lesions  EYES: NEGATIVE for vision changes or irritation  ENT: NEGATIVE for ear, mouth and throat problems  RESP: NEGATIVE for significant cough or SOB  BREAST: NEGATIVE for masses, tenderness or discharge  CV: NEGATIVE for chest pain, palpitations or peripheral edema  GI: NEGATIVE for nausea, abdominal pain, heartburn, or change in bowel habits  : NEGATIVE for unusual urinary or vaginal symptoms. Periods are regular.  MUSCULOSKELETAL: NEGATIVE for significant arthralgias or myalgia  NEURO: NEGATIVE for weakness, dizziness or paresthesias  PSYCHIATRIC: NEGATIVE for changes in mood or affect     OBJECTIVE:   /89   Pulse 75   Temp 98.2  F (36.8  C) (Tympanic)   Ht 1.735 m (5' 8.31\")   Wt 142.3 kg (313 lb 12.8 oz)   SpO2 97%   BMI 47.29 kg/m    Physical Exam  GENERAL: healthy, alert and no distress  NECK: no adenopathy, no asymmetry, masses, or scars and thyroid normal to palpation  RESP: lungs clear to auscultation - no rales, rhonchi or wheezes  CV: regular rate and rhythm, normal S1 S2, no S3 or S4, no murmur, click or rub, no peripheral edema and peripheral pulses strong  ABDOMEN: soft, nontender, no hepatosplenomegaly, no masses and bowel sounds normal  MS: no gross musculoskeletal defects noted, no edema  Diabetic foot exam: normal DP and PT pulses, no trophic changes or ulcerative lesions and normal sensory exam    Diagnostic Test Results:  Labs reviewed in Epic    ASSESSMENT/PLAN:   (Z00.00) Routine general medical examination at a health care facility  (primary encounter diagnosis)  Comment:   Plan: as below. Patient is currently going to phlebotomy school.    (E11.9,  Z79.4) Type 2 diabetes mellitus without complication, with long-term current use of insulin (H)  Comment:   Plan: OPTOMETRY REFERRAL, HEMOGLOBIN A1C, Basic         metabolic panel  (Ca, Cl, CO2, Creat, Gluc, K,         Na, BUN)        Following Endocrinology.    (Z13.6) CARDIOVASCULAR SCREENING; LDL GOAL LESS THAN 100  Comment: " "  Plan: Lipid panel reflex to direct LDL Fasting            (E66.01,  Z68.42) Morbid obesity with BMI of 45.0-49.9, adult (H)  Comment:   Plan: Basic metabolic panel  (Ca, Cl, CO2, Creat,         Gluc, K, Na, BUN)            (Z11.59) Need for hepatitis C screening test  Comment:   Plan: Hepatitis C Screen Reflex to HCV RNA Quant and         Genotype            (Z12.4) Cervical cancer screening  Comment:   Plan: will see female provider     (Z23) Encounter for immunization  Comment:   Plan: INFLUENZA VACCINE IM > 6 MONTHS VALENT IIV4         (AFLURIA/FLUZONE), COVID-19,PF,MODERNA (18+ YRS        BOOSTER .25ML)            (Z11.1) Tuberculosis screening  Comment:   Plan: Quantiferon TB Gold Plus        For school.      Patient has been advised of split billing requirements and indicates understanding: Yes    COUNSELING:  Reviewed preventive health counseling, as reflected in patient instructions       Regular exercise       Healthy diet/nutrition       Vision screening    Estimated body mass index is 46.45 kg/m  as calculated from the following:    Height as of 8/30/21: 1.74 m (5' 8.5\").    Weight as of 11/2/21: 140.6 kg (310 lb).        She reports that she has never smoked. She has never used smokeless tobacco.      Counseling Resources:  ATP IV Guidelines  Pooled Cohorts Equation Calculator  Breast Cancer Risk Calculator  BRCA-Related Cancer Risk Assessment: FHS-7 Tool  FRAX Risk Assessment  ICSI Preventive Guidelines  Dietary Guidelines for Americans, 2010  USDA's MyPlate  ASA Prophylaxis  Lung CA Screening    Leon Duarte MD, MD  [facility]  "

## 2022-03-03 NOTE — NURSING NOTE
Prior to immunization administration, verified patients identity using patient s name and date of birth. Please see Immunization Activity for additional information.     Screening Questionnaire for Adult Immunization    Are you sick today?   No   Do you have allergies to medications, food, a vaccine component or latex?   No   Have you ever had a serious reaction after receiving a vaccination?   No   Do you have a long-term health problem with heart, lung, kidney, or metabolic disease (e.g., diabetes), asthma, a blood disorder, no spleen, complement component deficiency, a cochlear implant, or a spinal fluid leak?  Are you on long-term aspirin therapy?   No   Do you have cancer, leukemia, HIV/AIDS, or any other immune system problem?   No   Do you have a parent, brother, or sister with an immune system problem?   No   In the past 3 months, have you taken medications that affect  your immune system, such as prednisone, other steroids, or anticancer drugs; drugs for the treatment of rheumatoid arthritis, Crohn s disease, or psoriasis; or have you had radiation treatments?   No   Have you had a seizure, or a brain or other nervous system problem?   No   During the past year, have you received a transfusion of blood or blood    products, or been given immune (gamma) globulin or antiviral drug?   No   For women: Are you pregnant or is there a chance you could become       pregnant during the next month?   No   Have you received any vaccinations in the past 4 weeks?   No     Immunization questionnaire answers were all negative.        Per orders of Dr. Duarte, injection of Fluzone, Moderna booster given by Veronica Díaz RN. Patient instructed to remain in clinic for 15 minutes afterwards, and to report any adverse reaction to me immediately.       Screening performed by Veronica Díaz RN on 3/3/2022 at 12:03 PM.

## 2022-03-04 LAB
HCV AB SERPL QL IA: NONREACTIVE
QUANTIFERON MITOGEN: 10 IU/ML
QUANTIFERON NIL TUBE: 0.05 IU/ML
QUANTIFERON TB1 TUBE: 0.05 IU/ML
QUANTIFERON TB2 TUBE: 0.05

## 2022-03-05 LAB
GAMMA INTERFERON BACKGROUND BLD IA-ACNC: 0.05 IU/ML
M TB IFN-G BLD-IMP: NEGATIVE
M TB IFN-G CD4+ BCKGRND COR BLD-ACNC: 9.95 IU/ML
MITOGEN IGNF BCKGRD COR BLD-ACNC: 0 IU/ML
MITOGEN IGNF BCKGRD COR BLD-ACNC: 0 IU/ML

## 2022-03-14 NOTE — TELEPHONE ENCOUNTER
Prior Authorization Approval    Authorization Effective Date: 3/11/2022  Authorization Expiration Date: 3/11/2023  Medication: Freestyle Desmond [place] / Koki - [place] Approved  Approved Dose/Quantity: 1 month  Reference #: [id number] [id number]   Insurance Company: [employer] - Phone [phone] Fax [phone]  Expected CoPay:       CoPay Card Available:      Foundation Assistance Needed:    Which Pharmacy is filling the prescription (Not needed for infusion/clinic administered): [facility] - [place], [place] - [address], [address]  Pharmacy Notified:    Patient Notified:

## 2022-03-23 ENCOUNTER — VIRTUAL VISIT (OUTPATIENT)
Dept: NUTRITION | Facility: CLINIC | Age: 22
End: 2022-03-23
Payer: COMMERCIAL

## 2022-03-23 DIAGNOSIS — Z79.4 TYPE 2 DIABETES MELLITUS WITHOUT COMPLICATION, WITH LONG-TERM CURRENT USE OF INSULIN (H): ICD-10-CM

## 2022-03-23 DIAGNOSIS — R03.0 ELEVATED BP WITHOUT DIAGNOSIS OF HYPERTENSION: ICD-10-CM

## 2022-03-23 DIAGNOSIS — E66.01 MORBID OBESITY WITH BMI OF 45.0-49.9, ADULT (H): Primary | ICD-10-CM

## 2022-03-23 DIAGNOSIS — E11.9 TYPE 2 DIABETES MELLITUS WITHOUT COMPLICATION, WITH LONG-TERM CURRENT USE OF INSULIN (H): ICD-10-CM

## 2022-03-23 PROCEDURE — 97802 MEDICAL NUTRITION INDIV IN: CPT | Mod: 95 | Performed by: DIETITIAN, REGISTERED

## 2022-03-23 NOTE — PROGRESS NOTES
Medical Nutrition Therapy  Visit Type: Initial assessment and intervention    Visit Details    How would you like to obtain your AVS? MyChart  If the correspondence for visit is dropped, how would you like your dietitian to reconnect with you:   call back by phone? Yes    Text to cell phone: [phone]  Will anyone else be joining your video visit or telephone call? No  {If patient encounters technical issues they should call [phone]    Type of service:  Video Visit     Start Time: 10:00am     End Time:11:11 AM    Originating Location (pt. Location): Home    Distant Location (provider location):  [facility] WORKING VIRTUAL FROM HOME     Platform used for Video Visit: [employer]      Referring Provider: No ref. provider found       REASON FOR REFERRAL:   Bev Araujo is a 21 year old female who is interested in Medical Nutrition Therapy (MNT) and education related to weight management.   She is accompanied by self.     NUTRITION ASSESSMENT:   Nutritional Goals 2/23/2022   Nutritional Goal Create healthier eating patterns;Create a plan to lose weight        Neurological 2/23/2022   Migraine Headaches Past;Current   Tension Headache Past;Current       No flowsheet data found.   No flowsheet data found.   No flowsheet data found.    Food Sensitivities 2/23/2022   Lactose intolerance Past;Current      Endocrine 2/23/2022   Type 2 diabetes Current   Overweight/obesity Current      Skin 2/23/2022   Dry Skin Current      No flowsheet data found.   No flowsheet data found.   Psychological 2/23/2022   ADD/ADHD/Asperger's Current   Depression/Anxiety Current      No flowsheet data found.   Womens Health Assessment 2/23/2022   Hysterectomy No   I am pregnant.  No   I am breastfeeding. No   I want to become pregnant within the next year . Yes   What do you use for contraception?  birth control pill   Any problems with current birth control method?   No   Date of last menstrual  period 37455   Are your periods irregular? No   Days from the start of one period to the next. 24   Days of Menstral Flow 6   Associated with menstral periods. Bloating;Water retention;Diarrhea/constipation   Are you officially in menopause? (no period for one year or longer)  No        Past Medical History:  Past Medical History:   Diagnosis Date     Chronic kidney disease      Diabetes (H)     prediabetic       Previous Surgeries:   No past surgical history on file.     Family History:  Family History   Problem Relation Age of Onset     Hypertension Mother      Glaucoma Mother      Diabetes Maternal Grandfather      Diabetes Paternal Grandmother      Hypertension Paternal Grandmother      Diabetes Brother      Glaucoma Brother      Heart Disease Father      Other Cancer Maternal Grandmother         ovarian     Cancer Maternal Grandmother      Glaucoma Maternal Grandmother      Macular Degeneration Maternal Grandmother         Lifestyle History:  Lifestyle 2/23/2022   Do you feel your life is stressful right now?  Yes   What is the cause(s) of stress in your life?  Work;Multi-tasking and multiple deadlines to meet;Over thinking and too much analysis;Taking care of parents, children or other family member   Are you currently implementing any strategies to help manage stress? No        Exercise History:  Exercise 2/23/2022   Does your occupation require extended periods of sitting?  No   Does your occupation require extended periods of repetitive movements (ex: walking or lifting)?  Yes   Do you currently participate in any forms of exercise? No   Rate your level of motivation for including exercise in your routine (0=none, 10=high): 7   Do you have any medical conditions, pain, injuries, surgeries etc. restricting you from exercise? No        Sleep History:  Sleep 2/23/2022   How many hours (on average) do you sleep per night? 7-9   What time do you turn off the lights? 12 PM   How long does it take for you to fall  asleep? 30-45 mins   What time do you stop using electronic devices? 12 PM   What time do you wake up? 9 AM   When do you eat your first meal?  10 AM   Do you feel well-rested during the day?  No   Do you take naps?  No   Do you have a comfortable bedroom environment (cool, quiet, dark, etc)? Yes   Do you have a sleep routine/ ritual that you do before bed?  No   How many hours do you spend per day looking at a screen (TV, computer, tablet and phone)? 3 to 4   Select all factors that apply to your current sleep habits: Have difficulty waking up in the morning;Eat large meals within 3 hours of going to bed;Feel tired/sluggish/fatigued during the day;Have tried supplements (ie: melatonin) for sleep        Nutrition History:  Nutrition 2/23/2022   Have you ever had a nutrition consultation? No   Do you currently follow a special diet or nutritional program? No   What do you feel are the biggest barriers getting in the way of achieving you nutritional goals? Lack of time;Lack of prep/cooking skills;Work (such as lack of time to eat, unhealthy choices, etc.);Lack of nutrition knowledge;Other   Do you have any food allergies, sensitivities or intolerances?  No       Digestion 2/23/2022   Do you experience stomach pains/cramping? Rarely   Do you experience bloating?  Weekly   Do you experience gas?  Rarely   Do you experience heartburn/acid reflux/indigestion? Weekly   How often do you have a bowel movement? 2 times per day   What is a typical bowel movement like for you? Select all that apply: Formed and soft      Food Access:  2/23/2022   Who does the grocery shopping? Self;Spouse/Partner   How often is grocery shopping done? Weekly   Where do you usually receive your groceries from? Select all that apply: [employer]   Do you read food labels? Yes   What do you look at?  Calories;Lactose Free;Carbs;Sugar;Protein   Who does the cooking? Select all that apply: Self;Spouse/Partner   How many meals do you  eat out per week?  1 to 3   What restaurants do you typically choose? Fast Food (Taco [employer], [employer], Subway, etc.)      Daily Patterns: 2/23/2022   How many days per week do you have breakfast? 4   How many days per week do you have lunch? 7   How many days per week do you have dinner? 7   How many days per week do you have snacks? 5      Protein Intake: 2/23/2022   How many times per day do you typically consume a protein source(s)? 3   What types of protein do you currently eat?  Ground Beef;Hamburgers;[place];Chicken Breast;Deli Chicken;Chicken Sausage;Other Chicken;Turkey Breast;Ground Turkey;Deli Turkey;Turkey Burnette;Eggs       Fat Intake:  2/23/2022   How many times per day do you typically consume healthy fat(s)? 2   What types of health fats do you currently eat? Select all that apply:  Almonds;Pecans;Cashews;Peanut butter;Coconut oil;Avocado oil;Olive oil;Avocado       Fruit Intake:  2/23/2022   How many times per day do you typically consume fruits? 1   What types of fruit do currently eat? Cherries;Dates;Mandarin oranges;Pears;Pineapple;Watermelon       Vegetable Intake:  2/23/2022   How many times per day do you typically consume vegetables? 3   What types of vegetables do you currently eat? Asparagus;Broccoli;[place] sprouts;Carrots;Cauliflower;Celery;Corn;Cucumber;Green onions/scallions;Onions;Potato (baked, boiled, mashed, French fries);Tomato;Water chestnuts      Grain Intake:  2/23/2022   How many times per day do you typically consume grains? 3   What types of grains do currently eat? Select all that apply:  Bagel (non-gluten free);Breads (non-gluten free)       Dairy Intake:  2/23/2022   How many times per day do you typically consume dairy? 3   What types of dairy do currently eat? Select all that apply:  Milk       Non-Dairy Alternative Intake:  2/23/2022   How many times per day do you typically consume non-dairy alternatives? 0   What types of non-dairy alternatives do currently eat? Select  all that apply:  Coconut milk;Oat milk       Sweets Intake:  2/23/2022   How many times per day do you typically consume sweets? 2      Beverage Intake:  2/23/2022   How many 8 oz cups of water do you typically consume per day?  4 to 5   How many 8 oz cups of caffeine do you typically consume per day?  1 to 2   How many drinks of alcohol do you typically consume per week (1 drink = 5 oz wine, 12 oz beer, 1.5 oz spirits)?   0       Lifestyle Recall:  2/23/2022   What time did you wake up? 7 AM   What time did you go to sleep? 12 PM   What time did you have breakfast? 8-9 AM   Where did you have breakfast?  Restaurant   What time did you have a morning snack? No snack   What time did you have lunch? 11AM-12 PM   Where did you have lunch?  Home   What time did you have an afternoon snack? 3-4 PM   Where did you have your afternoon snack? Work   What time did you have dinner? 9-10 PM   Where did you have dinner?  Work   What time did you have an evening snack? No Snack   Where did you exercise? Other          Additional concerns: takes medications and since starting lost about 10-13lbs. Recently moved in with grandma. She prefers chicken over red meat.   Breakfast: Shake - powder that she mixes with milk or water and protein bar     MEDICATIONS:  Current Outpatient Medications   Medication Sig Dispense Refill     blood glucose (NO BRAND SPECIFIED) lancets standard Use to test blood sugar 2 times daily or as directed. 100 each 1     blood glucose monitoring (NO BRAND SPECIFIED) meter device kit Use to test blood sugar 2 times daily or as directed. 1 kit 0     Continuous Blood Gluc  (FREESTYLE ANNE MARIE 2 READER) JOSESITO 1 each daily Use as directed with Anne Marie continuous glucose system. 1 each 0     hydrOXYzine (ATARAX) 25 MG tablet Take 25 mg by mouth every 4 hours as needed        liraglutide (VICTOZA PEN) 18 MG/3ML solution ADMINISTER 1.8 MG UNDER THE SKIN DAILY 27 mL 3     metFORMIN (GLUCOPHAGE) 500 MG tablet Take 1  tablet (500 mg) by mouth 2 times daily (with meals) 180 tablet 3     naproxen (NAPROSYN DR) 500 MG EC tablet Take 500 mg by mouth 2 times daily as needed (pain) 60 tablet 0     norethindrone (MICRONOR) 0.35 MG tablet Take 1 tablet (0.35 mg) by mouth daily 84 tablet 3     norethindrone (MICRONOR) 0.35 MG tablet Take 1 tablet (0.35 mg) by mouth daily 90 tablet 0     ONETOUCH ULTRA test strip USE TO TEST BLOOD SUGARS TWICE DAILY OR AS DIRECTED 100 strip 11       No flowsheet data found.      ALLERGIES:   No Known Allergies     .na  LABS:  Last Basic Metabolic Panel:  Lab Results   Component Value Date     03/03/2022     12/02/2021     11/16/2020     10/03/2018      Lab Results   Component Value Date    POTASSIUM 3.9 03/03/2022    POTASSIUM 4.2 12/02/2021    POTASSIUM 4.1 11/16/2020    POTASSIUM 4.1 10/03/2018     Lab Results   Component Value Date    CHLORIDE 111 03/03/2022    CHLORIDE 107 12/02/2021    CHLORIDE 110 11/16/2020    CHLORIDE 110 10/03/2018     Lab Results   Component Value Date    SEGUN 8.5 03/03/2022    SEGUN 8.7 12/02/2021    SEGUN 8.3 11/16/2020    SEGUN 8.8 10/03/2018     Lab Results   Component Value Date    CO2 25 03/03/2022    CO2 25 12/02/2021    CO2 24 11/16/2020    CO2 25 10/03/2018     Lab Results   Component Value Date    BUN 10 03/03/2022    BUN 9 12/02/2021    BUN 11 11/16/2020    BUN 13 10/03/2018     Lab Results   Component Value Date    CR 0.54 03/03/2022    CR 0.61 12/02/2021    CR 0.58 11/16/2020    CR 0.55 10/03/2018     Lab Results   Component Value Date    GLC 97 03/03/2022     12/02/2021     11/16/2020     08/13/2020     07/17/2019       Last Glucose Profile:   Hemoglobin A1C POCT   Date Value Ref Range Status   07/05/2021 5.3 0 - 5.6 % Final     Comment:     Normal <5.7% Prediabetes 5.7-6.4%  Diabetes 6.5% or higher - adopted from ADA   consensus guidelines.     03/31/2021 5.7 (H) 0 - 5.6 % Final     Comment:     Normal <5.7% Prediabetes  5.7-6.4%  Diabetes 6.5% or higher - adopted from ADA   consensus guidelines.     11/16/2020 5.6 0 - 5.6 % Final     Comment:     Normal <5.7% Prediabetes 5.7-6.4%  Diabetes 6.5% or higher - adopted from ADA   consensus guidelines.       Hemoglobin A1C   Date Value Ref Range Status   03/03/2022 5.4 0.0 - 5.6 % Final     Comment:     Normal <5.7%   Prediabetes 5.7-6.4%    Diabetes 6.5% or higher     Note: Adopted from ADA consensus guidelines.   12/02/2021 5.6 0.0 - 5.6 % Final     Comment:     Normal <5.7%   Prediabetes 5.7-6.4%    Diabetes 6.5% or higher     Note: Adopted from ADA consensus guidelines.       Last Lipid Profile:   Cholesterol   Date Value Ref Range Status   03/03/2022 138 <200 mg/dL Final   08/13/2020 196 <200 mg/dL Final   07/17/2019 158 <200 mg/dL Final   11/06/2017 166 <170 mg/dL Final     HDL Cholesterol   Date Value Ref Range Status   08/13/2020 40 (L) >49 mg/dL Final   07/17/2019 36 (L) >49 mg/dL Final   11/06/2017 45 (L) >45 mg/dL Final     Comment:     Low:             <40 mg/dl  Borderline low:   40-45 mg/dl       Direct Measure HDL   Date Value Ref Range Status   03/03/2022 39 (L) >=50 mg/dL Final     LDL Cholesterol Calculated   Date Value Ref Range Status   03/03/2022 75 <=100 mg/dL Final   08/13/2020 106 (H) <100 mg/dL Final     Comment:     Above desirable:  100-129 mg/dl  Borderline High:  130-159 mg/dL  High:             160-189 mg/dL  Very high:       >189 mg/dl     07/17/2019 82 <100 mg/dL Final     Comment:     Desirable:       <100 mg/dl   11/06/2017 88 <110 mg/dL Final     Triglycerides   Date Value Ref Range Status   03/03/2022 119 <150 mg/dL Final   08/13/2020 250 (H) <150 mg/dL Final     Comment:     Borderline high:  150-199 mg/dl  High:             200-499 mg/dl  Very high:       >499 mg/dl  Fasting specimen     07/17/2019 199 (H) <150 mg/dL Final     Comment:     Borderline high:  150-199 mg/dl  High:             200-499 mg/dl  Very high:       >499 mg/dl  Fasting  "specimen     11/06/2017 164 (H) <90 mg/dL Final     Comment:     Borderline high:   mg/dl  High:            >129 mg/dl  Fasting specimen       No results found for: CHOLHDLRATIO    Most recent CBC:  Recent Labs   Lab Test 05/17/21  1729 10/03/18  1530   WBC 8.3 10.8   HGB 13.3 13.3   HCT 39.1 39.9    359     Most recent hepatic panel:  Recent Labs   Lab Test 12/02/21  1052 10/03/18  1530   ALT 49 80*   AST 25 39     Most recent creatinine:  Recent Labs   Lab Test 03/03/22  1218 12/02/21  1052   CR 0.54 0.61       No components found for: GFRESETIMATEDLASTLAB(gfrestblack:1@  Lab Results   Component Value Date    ALBUMIN 3.4 12/02/2021    ALBUMIN 3.3 10/03/2018       Last Thyroid Profile:   TSH   Date Value Ref Range Status   05/17/2021 1.24 0.40 - 4.00 mU/L Final   08/13/2020 1.09 0.40 - 4.00 mU/L Final   07/17/2019 1.28 0.40 - 4.00 mU/L Final       Last Mineral Profile:   No results found for: GIANNA, IRON, FEB    Autoimmune & Inflammatory   No results found for: CRP      Last Vitamin Profile:   No results found for: NMW115, RODQ039, [id number], VITD3, D2VIT, D3VIT, DTOT, [id number], [id number], [id number], [id number], [id number], [id number]    ANTHROPOMETRICS:  Vitals:   BP Readings from Last 1 Encounters:   03/03/22 130/89     Pulse Readings from Last 1 Encounters:   03/03/22 75     Estimated body mass index is 47.29 kg/m  as calculated from the following:    Height as of 3/3/22: 1.735 m (5' 8.31\").    Weight as of 3/3/22: 142.3 kg (313 lb 12.8 oz).    Wt Readings from Last 5 Encounters:   03/03/22 142.3 kg (313 lb 12.8 oz)   11/02/21 140.6 kg (310 lb)   10/28/21 142.3 kg (313 lb 12.8 oz)   08/30/21 143.6 kg (316 lb 8 oz)   11/10/20 141.8 kg (312 lb 11.2 oz)       .NUTRITION DIAGNOSIS:   1. Altered nutrition-related laboratory values related to endocrine dysfunction as evidenced by elevated BMI, past elevated HbA1c     2. Altered nutrition-related laboratory values related to cardiac as evidenced by " elevated past elevated TG, past elevated LDL, and current low HDL.     3. Overweight/Obesity related to food and nutrition related knowledge deficit (excessive CHO intake, Inadequate fiber intake, inappropriate intake of healthy omega 3 fatty acids) as evidenced by nutrition intake record (limited variety of foods), past elevated labs.    4. Overweight/obesity related to inability to sustain diet/lifestyle change, as evidenced by many previous attempts at weight loss with weight regain over the past five years. Has seen improvement in lab values for recently       NUTRITION INTERVENTION:     Long Term Goals:   Goal: Lipid profile; Maintain TG <150 mg/dL, Increase HDL > 49mg/dL, Maintain LDL <100mg/dL within 6 months   Goal: Maintain Hemoglobin A1c <6 % within 2-6 months   Goal: Lose 20-24lbs in 6 months, recommend average 1-2lbs per week of weight loss.         Short Term Goals:  Goal 1: Focus on smoothie for breakfast, ---add in unsweetened flax, hemp or coconut milk, 1 tbsp of healthy fat such as flax seed ground or satnam seed ground. serving of fruit 1 cup of berries. Veggie (optional), 1 scoop of plant based protein powder or collagen powder, 1 serving of fruit and veggie such as kale, spinach, or veggie powder even spirulina - see recipes below or details)      Also, check out some of the other breakfast ideas provided such as oatmeal (naturally gluten free) with dairy free alternative milk, flax seed, nuts such as walnuts, fruit such as berries and protein like eggs or chicken/turkey sausage.     Avocado on slice of gluten free bread recommend [employer] brand      [email or web address]     Birch campbell brand for pancakes - paleo  https://PanÃ¨ve/collections/pancake-waffle-mix/products/paleo     Try siete soft taco shells with eggs and veggies in am for breakfast    https://[employer]/collections/tortillas/products/lohwkn-fqsua-dhygibkhi-6-pack        Goal 2:  Make sure to eat consistent meals daily with focus on balanced lunch and at least 1-2 snacks daily around 10am and 3pm.  Eating every three to four hours will help keep your insulin and blood sugar normal to help with weight loss  - see meal plan and recipe ideas in the cardi metabolic guides provided.     Some examples:   Beef Jerky with veggie or fruit   https://Porter [email or web address] Saile.net/recipe/3-ingredient-satnam-pudding/     Simple [place] Chips with hummus     Siete Chips with Guac and Salsa      Hummus with veggie      Fruit with nut butter - banana with sunflower seed butter or apple with almond butter      Goal 3:  Check out Voz.io journal it can be beneficial in setting short term and long term goals for success beyond just looking at calories in and out.  https://Localyte.com/collections/dailygreateeden/products/dailyThe Jetstream-wellness-journal-90-day?lninrql=98289473881646     Goal 4: Start cutting back on gluten and dairy. Trial Gluten and Dairy free for at least 30 days or until our next appointment.   - consider just having 1 serving of grains per day and swap for lower carb/sugar options such as non-starchy veggies. Read labels for low sugar and carbs.          Goal 5: Recommend the following supplements to start     One Multivitamin by Pure encapsulations - 1 capsule per day with food     Nordic Naturals Plus vitamin D - 1-2 capsules daily with food ----https://www.Taptu.com/consumers/ultimate-omega-d3    Great prenatal - packets by designs for health or PhantomAlert.com.cis plus one      Look on amazon to find the supplement recommendations or talk with the [facility] to special order. They may be able to offer a discount as well.        Smoothie Recipes to Kickoff Your Success!!     Jaye Salazar      Makes 1 servings     1 cup unsweetened macadamia  nut milk or filtered water   1 scoop ortez vanilla plant based protein powder or 1-2 scoops collagen powder from vital proteins (plain)   1-2 Kiwi s peeled   1 handful cilantro   1 lime juiced    - 1 avocado   2 tsp spirulina   1 inch tonny fresh pilled      Method:     In  (recommend vitamix or blend tech) add dairy free alternative milk or filtered water. Add in the rest of ingredients blend on high for 2 mins. If desire thinner consistency add in the water or dairy alternative. Enjoy :)     Green Detox Smoothie      Makes 1 servings     1 cup unsweetened macadamia nut milk or filtered water (I like filtered water more for this recipe)  1-2 scoops collagen powder from vital proteins (plain)   1 small granny escobar apple (prefer organic - lower pesticides and endocrine disruptors)   1 handful cilantro   1 lime juiced    - 1 avocado (optional)   1 cucumber      Method:     In  (recommend vitamix or blend tech) add dairy free alternative milk or filtered water. Add in the rest of ingredients blend on high for 2 mins. If desire thinner consistency add in the water or dairy alternative. Enjoy :)        Spirulina Banana Smoothie      Makes 1 serving     1-2 cup unsweetened macadamia nut milk or filtered water (I like filtered water more for this recipe)  1 scoop ortez vanilla plant based protein powder     banana   1 handful spinach    - 1 avocado  1-2 tsp spirulina    1 tbsp ground flax seed      Method:     In  (recommend vitamix or blend tech) add dairy free alternative milk or filtered water. Add in the rest of ingredients blend on high for 2 mins. If desire thinner consistency add in the water or dairy alternative. Enjoy :)     Berry Blast Smoothie      Makes 1 serving     1-2 cup unsweetened macadamia nut milk or filtered water (I like filtered water more for this recipe)  1 scoop ortez vanilla plant based protein powder or collagen protein powder by vital proteins (plain)   1 cup mixed berries     1 handful spinach    - 1 avocado  1-2 tsp raw organic maqui berry powder by Sunfood superfoods (optional but very immune boosting and jammed with phytonutrients)   1 tbsp ground flax seed      Method:     In  (recommend vitamix or blend tech) add dairy free alternative milk or filtered water. Add in the rest of ingredients blend on high for 2 mins. If desire thinner consistency add in the water or dairy alternative. Enjoy :)     Superfood + Immune Boosting Powders:  Maqui Berry Powder   Turmeric Powder   Spirulina Powder   Glutamine Powder   Matcha Powder   Ruth fresh or powdered   Kale, dandelion greens or spinach         Omega 3 and Protein Desired Toppings:   Add some healthy protein and omega 3 packed seeds for an extra special nutrient packed topping and a little extra crunch!   1 tsp -1 tbps ground flax seed   1 tsp -1 tbsp hemp seeds   1 tsp-1 tbsp satnam seeds         Anti-inflammatory Mediterranean Approach: Eat whole, unprocessed real foods in their unprocessed forms such as fruits, vegetables, whole grains (prefer gluten free), nuts, legumes, extra virgin olive oil, spices, modest amounts of poultry, and fish.      Avoid inflammatory foods: Eliminate gluten found in wheat, barley, rye, oats, kamut, and spelt for at least 3 weeks to identify any hidden reaction. Gluten sensitivity or allergy can cause many different types of symptoms form migraines to fatigue to weight gain.  If symptoms go away this is a clue you may be reactive to gluten.  Dairy can also be inflammatory consider eliminating for 3 weeks as well. The proteins like casein and whey in dairy can irritate and inflame your gut. Also the sugar lactase in dairy can cause digestive issues in addition to blood sugar spikes.     Food plan - 1400 calories per day     Protein 9-10 servings per day  - include at each meal to stabilize blood sugars   (Choose 3oz or 21g per meal and aim for 1oz of 7g for snacks)   - Strive for 1-2 servings of  fish per week especially of higher omega-3 fatty acid containing fish such as salmon.     Legumes 1 serving per day     Dairy alternatives 1 serving per day     Nuts and seeds 2-3 servings per day - great to incorporate as snacks     Fats and oils 4 servings per day     Non starchy vegetables 7-8 servings per day     Starchy vegetable limit 1 serving per day as they tend to impact blood sugar (they are moderate-GI).     Fruits 2 servings per day - best to couple with a little bit of protein or fat to offset a rise in blood sugars (they are low-moderate-GI foods).     Whole grains <1 serving per day - try gluten free whole grains instead       Incorporate protein powder daily:    Plant based hemp (recommended brands: [place], Nutiva, Just Hemp Protein, [employer])      Plant based pea (recommended brands: Naked Pea, Now Sports). If you want to try a combo of pea and hemp the brand Dai in vanilla or chocolate is a great option.     Try Bone broth protein powder or collagen peptides in liquid bone broth, vegetable broth or 12 oz of water as snack. The bone broth powder and collagen can be used for soups as well. This can help provide essential amino acids and minerals that heal your gut as well as balance blood sugars. A great option if you have a hard time tolerating solids.     Choose Low Glycemic (GI) foods: Regulate your sugar levels by eating foods that do not spike blood sugars.  Eat low -GI foods so only small fluctuations in blood glucose and insulin levels are produced.      Examples of low-GI foods: nuts, seeds, GF oats, most vegetables especially non-starchy and fruits.     Medium or high-GI foods should be eaten with a protein or fat, both of which blunt the glycemic effect of these foods. This reduces the overall glycemic impact of a meal.   Ex: Most grains and starchy veggies are medium/high GI.     Avoid foods containing refined sugars, artificial sweeteners, and refined grains they are  considered high-GI because they lead to sharp increases in blood sugars levels, which increase insulin sensitivity causing increased TG, and low good cholesterol (HDL).   Ex: cakes, cookies, pies, bread, sodas, fruit drinks, presweetened tea, coffee drinks, energy or sport drinks, flavored milk and other processed foods.     Choose foods high in fiber: Aim for at least 5 grams of fiber per serving of food or a total of 25-35 grams fiber per day. Remember, when looking at the label, you can take the fiber away from the total carbs. Ex:15g of total carbs - 4g of fiber = 11g net carbs     Insoluble fiber acts like a bulky  inner broom,  sweeping out debris from the intestine and creating more motility and movement.      Soluble fiber attracts water and swells, creating a gel that slows digestion.  Also, slows the release of glucose from foods into the blood which stops spikes in blood sugar levels.  Soluble fiber traps toxins in the gut, helping to carry them to excretion and provides healthy bacteria in the digestive tract.     Choose High Quality Fats: Adding anti-inflammatory fats into your diet such as fish (salmon, herring, mackerel, and sardines), omega 3 eggs, satnam seeds, ground flax seeds/milk, hemp seeds/milk, walnuts and some other certain leafy greens will increase omega-3 fats to omeaga-6 fats ratio.     Therapeutic fats both monounsaturated and polyunsaturated to include daily: ground flaxseeds, unsalted mixed nuts, avocados, olives, extra-virgin olive oil.     Emphasize high-quality oils and fats in the diet daily such as avocado oil, coconut oil, flaxseed, olive, sesame. Ex: 1 tsp to 1 tbsp of MCT oil from coconuts can be added into coffee, smoothies, and salad dressings per day.     Avoid trans fats found in processed foods     Drink more water. Hydration is critical, so drink at least six to eight glasses of water a day. Drink more water between meals and less at meals.     Try adding herbal teas  (sugar free) or lemon/lime/cucumber/fruit to water for flavor. Avoid artificial sweetener packets to flavor your water.     Cut back on coffee switch to green tea. Avoid adding sugar and milk to coffee instead use dairy alternatives such as almond, flax, coconut milk.     Focus on high quality micro-nutrients.     One Multivitamin by Pure encapsulations - 1 capsule per day with food     Nordic Naturals Plus vitamin D - 1-2 capsules daily with food    Rebuild the friendly bacteria in your microbime. Start by taking a probiotic supplement, they will help rebuild the healthy bacteria so essential to good gut health. Recolonization your digestive tract with healthy, beneficial good bacteria (probiotics). You can do that by taking very high-potency probiotics (look for at least 25 billion live CFU s from diversified strains of Lactobacillus, Bifidobacterium, and Saccharomyces boulardii), taken twice a day for one to two months.  Start slowly and observe how the probiotics affect your gut.  In some cases, certain individuals may need to delay probiotics until their gut is more intact.      Eat fermented foods. Include plenty of probiotic-rich foods like kimchi, kombucha, miso, or sauerkraut. Sometimes, you can also eat yogurt if you are not allergic to dairy. Try unsweetened sheep or goat yogurt. These are all foods that help your gut juve get and stay healthy.    In addition you can help establish healthy gut microflora by consuming foods that contain live active cultures ( probiotics ) such as kimchi,     Increase physical activity. Moving our body helps move our bowels and speeds up your metabolism.     Exercise 15-60 minutes daily--whether that looks like burst training, yoga, or vigorous walking.    Manage your stress. Stress can negatively impact the way you digest foods and absorb nutrients leading to more digestive issues (constipation, diarrhea, indigestion, nausea etc), imbalanced blood sugars and weight  "imbalances. Try to focus on the following relaxation techniques:     Regular exercise such as walking     Yoga    Meditation     Breathing techniques     Time management     Track what you eat. Writing down or tracking through an jurgen what you eat as well as how you feel acn help you identify patterns in your symptoms. This can help you become more aware and creat a diet that is right for you.     Pick a food tracking jurgen:     Through the mysymptoms jurgen, you can track symptoms, bowl movements, medications, stress, exercise, sleep and foods as well as beverages to become more mindful. [email or web address]    Through the [employer] jurgen, you can focus more on calories and macronutrient's of foods to balance blood sugars or monitor weight. You can track blood sugars in the notes section along with symptoms, bowl movements, medications, stress, exercise, water intake and sleep.   Note: You don't have to journal forever and it is more important that you have consistent meals as well as snacks while focusing on adding in healthy foods.    NUTRITION RESOURCES:  1. Nicki Arauz's \"It Starts with the Egg: How the Science of Egg Quality Can Help You Get Pregnant Naturally, Prevent Miscarriage, and Improve Your Odds in IVF\"   2. IFM Elimination Packet     Functional Nutrition Fundamentals     Comprehensive Guide    Meal plan with recipes         PATIENT'S BEHAVIOR CHANGE GOALS:   See nutrition intervention for patient stated behavior change goals. AVS was printed and given to patient at today's appointment.    MONITOR / EVALUATE:  Registered Dietitian will monitor/evaluate the following:     Beliefs and attitudes related to food    Food and nutrition knowledge / skills    Food / Beverage / Nutrient intake     Pertinent Labs    Progress toward meeting stated nutrition-related goals    Readiness to change nutrition-related behaviors    Weight change    Digestion     COORDINATION OF CARE:  Follow up with primary care " provider as needed      FOLLOW-UP:  Follow-up appointment recommend in 4-6 weeks     Time spent in minutes: 60 minutes 4 units   Encounter: Individual    Altagracia Yap RD, CLT, LD  Integrative Registered Dietitian

## 2022-03-25 ENCOUNTER — OFFICE VISIT (OUTPATIENT)
Dept: URGENT CARE | Facility: URGENT CARE | Age: 22
End: 2022-03-25
Payer: COMMERCIAL

## 2022-03-25 ENCOUNTER — ANCILLARY PROCEDURE (OUTPATIENT)
Dept: GENERAL RADIOLOGY | Facility: CLINIC | Age: 22
End: 2022-03-25
Attending: PHYSICIAN ASSISTANT
Payer: COMMERCIAL

## 2022-03-25 VITALS
OXYGEN SATURATION: 99 % | WEIGHT: 293 LBS | TEMPERATURE: 98.7 F | SYSTOLIC BLOOD PRESSURE: 138 MMHG | BODY MASS INDEX: 47.13 KG/M2 | HEART RATE: 72 BPM | DIASTOLIC BLOOD PRESSURE: 99 MMHG

## 2022-03-25 DIAGNOSIS — S92.514A CLOSED NONDISPLACED FRACTURE OF PROXIMAL PHALANX OF LESSER TOE OF RIGHT FOOT, INITIAL ENCOUNTER: Primary | ICD-10-CM

## 2022-03-25 DIAGNOSIS — M79.674 PAIN OF TOE OF RIGHT FOOT: ICD-10-CM

## 2022-03-25 DIAGNOSIS — Z79.4 TYPE 2 DIABETES MELLITUS WITHOUT COMPLICATION, WITH LONG-TERM CURRENT USE OF INSULIN (H): ICD-10-CM

## 2022-03-25 DIAGNOSIS — E11.9 TYPE 2 DIABETES MELLITUS WITHOUT COMPLICATION, WITH LONG-TERM CURRENT USE OF INSULIN (H): ICD-10-CM

## 2022-03-25 PROCEDURE — 73630 X-RAY EXAM OF FOOT: CPT | Mod: RT | Performed by: RADIOLOGY

## 2022-03-25 PROCEDURE — 99214 OFFICE O/P EST MOD 30 MIN: CPT | Performed by: PHYSICIAN ASSISTANT

## 2022-03-25 ASSESSMENT — ENCOUNTER SYMPTOMS
RHINORRHEA: 0
SORE THROAT: 0
RESPIRATORY NEGATIVE: 1
WEAKNESS: 0
EYES NEGATIVE: 1
SHORTNESS OF BREATH: 0
FEVER: 0
MYALGIAS: 0
NECK PAIN: 0
CHILLS: 0
WOUND: 0
CARDIOVASCULAR NEGATIVE: 1
ARTHRALGIAS: 1
ALLERGIC/IMMUNOLOGIC NEGATIVE: 1
VOMITING: 0
DIZZINESS: 0
NAUSEA: 0
JOINT SWELLING: 0
LIGHT-HEADEDNESS: 0
DIARRHEA: 0
ENDOCRINE NEGATIVE: 1
HEADACHES: 0
COUGH: 0
NECK STIFFNESS: 0
BACK PAIN: 0
PALPITATIONS: 0

## 2022-03-25 NOTE — LETTER
[facility] [address] [address]          March 25, 2022    RE:  Bev Araujo                                                                                                                                                       [address] [place] [address]            To whom it may concern:    Bev Whitaker Van is under my professional care for R toe fracture.  She  may return to work with no restrictions Monday 3/28/2022      Sincerely,        Alessandro Mckay PA-C

## 2022-03-25 NOTE — PATIENT INSTRUCTIONS
Patient Education     Closed Toe Fracture  Your toe is broken (fractured). This causes pain, swelling, and sometimes bruising. This injury usually takes about 4 to 6 weeks to heal, but can sometimes take longer. Toe injuries are often treated by taping the injured toe to the next one (buddy taping). Or you may have a hard shoe, splint, or cast. These protect the injured toe and hold it in position.   If the toenail has been severely injured, it may fall off in 1 to 2 weeks. It takes up to 12 months for a new toenail to grow back.   Home care  Follow these guidelines when caring for yourself at home:    You may be given a cast shoe to wear to keep your toe from moving. If not, you can use a sandal or any shoe that doesn t put pressure on the injured toe until the swelling and pain go away. If using a sandal, be careful not to strike your foot against anything. Another injury could make the fracture worse. If you were given crutches, don t put full weight on the injured foot until you can do so without pain, or as directed by your healthcare provider.    Keep your foot elevated to reduce pain and swelling. When sleeping, put a pillow under the injured leg. When sitting, support the injured leg so it is above your waist. This is very important during the first 2 days (48 hours).    Put an ice pack on the injured area. Do this for 20 minutes every 1 to 2 hours the first day for pain relief. You can make an ice pack by wrapping a plastic bag of ice cubes in a thin towel. As the ice melts, be careful that any cloth or paper tape doesn t get wet. Continue using the ice pack 3 to 4 times a day for the next 2 days. Then use the ice pack as needed to ease pain and swelling.    If buddy tape was used and it becomes wet or dirty, change it. You may replace it with paper, plastic, or cloth tape. Cloth tape and paper tapes must be kept dry.    You may use acetaminophen or ibuprofen to control pain, unless another pain medicine  was prescribed. If you have chronic liver or kidney disease, talk with your healthcare provider before using these medicines. Also talk with your provider if you ve had a stomach ulcer or gastrointestinal bleeding.    You may return to sports or physical education activities after 4 weeks when you can run without pain, or as directed by your healthcare provider.  Follow-up care  Follow up with your healthcare provider or as advised. This is to make sure the bone is healing the way it should.   X-rays may be taken. You will be told of any new findings that may affect your care.  When to seek medical advice  Call your healthcare provider right away if any of these occur:    Pain or swelling gets worse    The cast/splint cracks    The cast and padding get wet and stays wet more than 24 hours    Bad odor from the cast/splint or wound fluid stains the cast    Tightness or pressure under the cast/splint gets worse    Toe becomes cold, blue, numb, or tingly    You can t move the toe    Signs of infection: fever, redness, warmth, swelling, or drainage from the wound or cast    Fever of 100.4 F (38 C) or higher, or as directed by your healthcare provider    Tali Pompa last reviewed this educational content on 2/1/2020 2000-2021 [employer]. All rights reserved. This information is not intended as a substitute for professional medical care. Always follow your healthcare professional's instructions.

## 2022-03-25 NOTE — PROGRESS NOTES
Chief Complaint:    Chief Complaint   Patient presents with     Toe Injury     PT INJURED TOE ON ROCKIMG CHAIR IN HER GARAGE 3/24, SWOLLEN AND BRUSIED,  HURTS TO WALK, PAINFUL         Medical Decision Making:    Vital signs reviewed by Alessandro Mckay PA-C  BP (!) 138/99   Pulse 72   Temp 98.7  F (37.1  C) (Oral)   Wt 141.9 kg (312 lb 12.8 oz)   SpO2 99%   BMI 47.13 kg/m      Differential Diagnosis:  MS Injury Pain: sprain, fracture, muscle strain, contusion and dislocation      ASSESSMENT:     1. Closed nondisplaced fracture of proximal phalanx of lesser toe of right foot, initial encounter    2. Type 2 diabetes mellitus without complication, with long-term current use of insulin (H)    3. Pain of toe of right foot           PLAN:     XR of the R foot shows minimally displaced avulsion type fracture of the distal end of the proximal phalynx of the 5th digit per my read.  RICE discussed.  Ibuprofen and or Tylenol for discomfort.  Patient instructed to monitor blood sugars closely with injury and follow up with PCP for any DM medication changes if needed.  Patient instructed to follow up with PCP in 1 week if symptoms are not improving.  Sooner if symptoms worsen.  Worrisome symptoms discussed with instructions to go to the ED.  Patient verbalized understanding and agreed with this plan.    Labs:     Results for orders placed or performed in visit on 03/25/22   XR Foot Right G/E 3 Views     Status: None    Narrative    XR FOOT RIGHT G/E 3 VIEWS 3/25/2022 11:12 AM     HISTORY: 5th digit pain after kicked rocking chair yesterday.; Pain of  toe of right foot    COMPARISON: None.      Impression    IMPRESSION: No fractures are evident. Normal joint spacing and  alignment. The soft tissues are unremarkable.    CARLA MCWILLIAMS MD         SYSTEM ID:  [id number]       Current Meds:    Current Outpatient Medications:      blood glucose (NO BRAND SPECIFIED) lancets standard, Use to test blood sugar 2 times daily or as  directed., Disp: 100 each, Rfl: 1     blood glucose monitoring (NO BRAND SPECIFIED) meter device kit, Use to test blood sugar 2 times daily or as directed., Disp: 1 kit, Rfl: 0     Continuous Blood Gluc  (FREESTYLE ANNE MARIE 2 READER) JOSESITO, 1 each daily Use as directed with Anne Marie continuous glucose system., Disp: 1 each, Rfl: 0     hydrOXYzine (ATARAX) 25 MG tablet, Take 25 mg by mouth every 4 hours as needed , Disp: , Rfl:      liraglutide (VICTOZA PEN) 18 MG/3ML solution, ADMINISTER 1.8 MG UNDER THE SKIN DAILY, Disp: 27 mL, Rfl: 3     metFORMIN (GLUCOPHAGE) 500 MG tablet, Take 1 tablet (500 mg) by mouth 2 times daily (with meals), Disp: 180 tablet, Rfl: 3     naproxen (NAPROSYN DR) 500 MG EC tablet, Take 500 mg by mouth 2 times daily as needed (pain), Disp: 60 tablet, Rfl: 0     norethindrone (MICRONOR) 0.35 MG tablet, Take 1 tablet (0.35 mg) by mouth daily, Disp: 84 tablet, Rfl: 3     ONETOUCH ULTRA test strip, USE TO TEST BLOOD SUGARS TWICE DAILY OR AS DIRECTED, Disp: 100 strip, Rfl: 11    Allergies:  No Known Allergies    SUBJECTIVE    HPI: Bev Araujo is an 22 year old female who presents for evaluation and treatment of R 5th digit injury.  Symptoms started 1 days ago when she kicked a rocking chair accidentally. She has some bruising and pain that is worse with weight bearing.  She has not tried anything for the symptoms.      Patient is able to walk on the R foot. No numbness, tingling, or dysfunction of the toe.      ROS:      Review of Systems   Constitutional: Negative for chills and fever.   HENT: Negative for congestion, ear pain, rhinorrhea and sore throat.    Eyes: Negative.    Respiratory: Negative.  Negative for cough and shortness of breath.    Cardiovascular: Negative.  Negative for chest pain and palpitations.   Gastrointestinal: Negative for diarrhea, nausea and vomiting.   Endocrine: Negative.    Genitourinary: Negative.    Musculoskeletal: Positive for arthralgias.  Negative for back pain, joint swelling, myalgias, neck pain and neck stiffness.   Skin: Negative.  Negative for rash and wound.   Allergic/Immunologic: Negative.  Negative for immunocompromised state.   Neurological: Negative for dizziness, weakness, light-headedness and headaches.        Family History   Family History   Problem Relation Age of Onset     Hypertension Mother      Glaucoma Mother      Diabetes Maternal Grandfather      Diabetes Paternal Grandmother      Hypertension Paternal Grandmother      Diabetes Brother      Glaucoma Brother      Heart Disease Father      Other Cancer Maternal Grandmother         ovarian     Cancer Maternal Grandmother      Glaucoma Maternal Grandmother      Macular Degeneration Maternal Grandmother        Social History  Social History     Socioeconomic History     Marital status: Single     Spouse name: Not on file     Number of children: Not on file     Years of education: Not on file     Highest education level: Not on file   Occupational History     Not on file   Tobacco Use     Smoking status: Never Smoker     Smokeless tobacco: Never Used     Tobacco comment: Dad smokes outside   Vaping Use     Vaping Use: Never used   Substance and Sexual Activity     Alcohol use: Yes     Comment: rare     Drug use: No     Sexual activity: Yes     Partners: Male     Birth control/protection: Condom, Pill     Comment: never   Other Topics Concern     Parent/sibling w/ CABG, MI or angioplasty before 65F 55M? Not Asked   Social History Narrative     Not on file     Social Determinants of Health     Financial Resource Strain: Not on file   Food Insecurity: Not on file   Transportation Needs: Not on file   Physical Activity: Not on file   Stress: Not on file   Social Connections: Not on file   Intimate Partner Violence: Not on file   Housing Stability: Not on file        Surgical History:  No past surgical history on file.     Problem List:  Patient Active Problem List   Diagnosis     Morbid  obesity (H)     Attention deficit disorder     Right-sided low back pain without sciatica, unspecified chronicity     Elevated BP without diagnosis of hypertension     Elevated fasting blood sugar     Type 2 diabetes mellitus without complication, with long-term current use of insulin (H)     Morbid obesity with BMI of 45.0-49.9, adult (H)           OBJECTIVE:     Vital signs noted and reviewed by Alessandro Mckay PA-C  BP (!) 138/99   Pulse 72   Temp 98.7  F (37.1  C) (Oral)   Wt 141.9 kg (312 lb 12.8 oz)   SpO2 99%   BMI 47.13 kg/m       PEFR:    Physical Exam  Vitals and nursing note reviewed.   Constitutional:       General: She is not in acute distress.     Appearance: She is well-developed. She is not ill-appearing, toxic-appearing or diaphoretic.   HENT:      Head: Normocephalic and atraumatic.      Right Ear: Tympanic membrane and external ear normal. No drainage, swelling or tenderness. Tympanic membrane is not perforated, erythematous, retracted or bulging.      Left Ear: Tympanic membrane and external ear normal. No drainage, swelling or tenderness. Tympanic membrane is not perforated, erythematous, retracted or bulging.      Nose: No mucosal edema, congestion or rhinorrhea.      Right Sinus: No maxillary sinus tenderness or frontal sinus tenderness.      Left Sinus: No maxillary sinus tenderness or frontal sinus tenderness.      Mouth/Throat:      Pharynx: No pharyngeal swelling, oropharyngeal exudate, posterior oropharyngeal erythema or uvula swelling.      Tonsils: No tonsillar abscesses.   Eyes:      Pupils: Pupils are equal, round, and reactive to light.   Neck:      Trachea: Trachea normal.   Cardiovascular:      Rate and Rhythm: Normal rate and regular rhythm.      Heart sounds: Normal heart sounds, S1 normal and S2 normal. No murmur heard.    No friction rub. No gallop.   Pulmonary:      Effort: Pulmonary effort is normal. No respiratory distress.      Breath sounds: Normal breath sounds. No  decreased breath sounds, wheezing, rhonchi or rales.   Abdominal:      General: Bowel sounds are normal. There is no distension.      Palpations: Abdomen is soft. Abdomen is not rigid. There is no mass.      Tenderness: There is no abdominal tenderness. There is no guarding or rebound.   Musculoskeletal:      Cervical back: Full passive range of motion without pain, normal range of motion and neck supple.      Right foot: Normal range of motion and normal capillary refill. Tenderness present. No swelling, deformity, bony tenderness or crepitus. Normal pulse.        Feet:       Comments: Small amount of bruising of the proximal end of the R 5th digit.   Lymphadenopathy:      Cervical: No cervical adenopathy.   Skin:     General: Skin is warm and dry.   Neurological:      Mental Status: She is alert and oriented to person, place, and time.      Cranial Nerves: No cranial nerve deficit.      Deep Tendon Reflexes: Reflexes are normal and symmetric.   Psychiatric:         Behavior: Behavior normal. Behavior is cooperative.         Thought Content: Thought content normal.         Judgment: Judgment normal.             Alessandro Mckay PA-C  3/25/2022, 10:28 AM

## 2022-03-25 NOTE — PATIENT INSTRUCTIONS
NUTRITION INTERVENTION:     Long Term Goals:   Goal: Lipid profile; Maintain TG <150 mg/dL, Increase HDL > 49mg/dL, Maintain LDL <100mg/dL within 6 months   Goal: Maintain Hemoglobin A1c <6 % within 2-6 months   Goal: Lose 20-24lbs in 6 months, recommend average 1-2lbs per week of weight loss.         Short Term Goals:  Goal 1: Focus on smoothie for breakfast, ---add in unsweetened flax, hemp or coconut milk, 1 tbsp of healthy fat such as flax seed ground or satnam seed ground. serving of fruit 1 cup of berries. Veggie (optional), 1 scoop of plant based protein powder or collagen powder, 1 serving of fruit and veggie such as kale, spinach, or veggie powder even spirulina - see recipes below or details)      Also, check out some of the other breakfast ideas provided such as oatmeal (naturally gluten free) with dairy free alternative milk, flax seed, nuts such as walnuts, fruit such as berries and protein like eggs or chicken/turkey sausage.     Avocado on slice of gluten free bread recommend base SetMeUp brand      https://[employer]/?gclid=[email or web address]     Birch campbell brand for pancakes - paleo  https://Busportal/collections/pancake-waffle-mix/products/paleo     Try siete soft taco shells with eggs and veggies in am for breakfast   https://Big Stage/collections/tortillas/products/orjplk-wlcyc-zvxrmfxpu-6-pack        Goal 2:  Make sure to eat consistent meals daily with focus on balanced lunch and at least 1-2 snacks daily around 10am and 3pm.  Eating every three to four hours will help keep your insulin and blood sugar normal to help with weight loss  - see meal plan and recipe ideas in the cardi metabolic guides provided.     Some examples:   Beef Jerky with veggie or fruit   https://i2 Telecom IP Holdingsodfoodie.net/recipe/3-ingredient-satnam-pudding/     Simple Mills Chips with hummus     Siete Chips with Guac and Salsa      Hummus  with veggie      Fruit with nut butter - banana with sunflower seed butter or apple with almond butter      Goal 3:  Check out dailyKodiak Networks.co [employer] Registry journal it can be beneficial in setting short term and long term goals for success beyond just looking at calories in and out.  https://Sambazon/collections/dailygreatness/products/dailySearchboxeaAuthentix-wellness-journal-90-day?lajfvhv=28112952377460     Goal 4: Start cutting back on gluten and dairy. Trial Gluten and Dairy free for at least 30 days or until our next appointment.   - consider just having 1 serving of grains per day and swap for lower carb/sugar options such as non-starchy veggies. Read labels for low sugar and carbs.          Goal 5: Recommend the following supplements to start     One Multivitamin by Pure encapsulations - 1 capsule per day with food     Nordic Naturals Plus vitamin D - 1-2 capsules daily with food ----https://www.Culture Machine/consumers/ultimate-omega-d3    Great prenatal - packets by designs for health or KnockaTV plus one      Look on amazon to find the supplement recommendations or talk with the [facility] to special order. They may be able to offer a discount as well.        Smoothie Recipes to Kickoff Your Success!!     Kiwi Aliciaa Brandonreynaldo      Makes 1 servings     1 cup unsweetened macadamia nut milk or filtered water   1 scoop ortez vanilla plant based protein powder or 1-2 scoops collagen powder from vital proteins (plain)   1-2 Kiwi s peeled   1 handful cilantro   1 lime juiced    - 1 avocado   2 tsp spirulina   1 inch tonny fresh pilled      Method:     In  (recommend vitamix or blend tech) add dairy free alternative milk or filtered water. Add in the rest of ingredients blend on high for 2 mins. If desire thinner consistency add in the water or dairy alternative. Enjoy :)     Green Detox Smoothie      Makes 1 servings     1 cup unsweetened macadamia nut milk or filtered water  (I like filtered water more for this recipe)  1-2 scoops collagen powder from vital proteins (plain)   1 small granny escobar apple (prefer organic - lower pesticides and endocrine disruptors)   1 handful cilantro   1 lime juiced    - 1 avocado (optional)   1 cucumber      Method:     In  (recommend vitamix or blend tech) add dairy free alternative milk or filtered water. Add in the rest of ingredients blend on high for 2 mins. If desire thinner consistency add in the water or dairy alternative. Enjoy :)        Spirulina Banana Smoothie      Makes 1 serving     1-2 cup unsweetened macadamia nut milk or filtered water (I like filtered water more for this recipe)  1 scoop ortez vanilla plant based protein powder     banana   1 handful spinach    - 1 avocado  1-2 tsp spirulina    1 tbsp ground flax seed      Method:     In  (recommend vitamix or blend tech) add dairy free alternative milk or filtered water. Add in the rest of ingredients blend on high for 2 mins. If desire thinner consistency add in the water or dairy alternative. Enjoy :)     Berry Blast Smoothie      Makes 1 serving     1-2 cup unsweetened macadamia nut milk or filtered water (I like filtered water more for this recipe)  1 scoop ortez vanilla plant based protein powder or collagen protein powder by vital proteins (plain)   1 cup mixed berries    1 handful spinach    - 1 avocado  1-2 tsp raw organic maqui berry powder by Sunfood superfoods (optional but very immune boosting and jammed with phytonutrients)   1 tbsp ground flax seed      Method:     In  (recommend vitamix or blend tech) add dairy free alternative milk or filtered water. Add in the rest of ingredients blend on high for 2 mins. If desire thinner consistency add in the water or dairy alternative. Enjoy :)     Superfood + Immune Boosting Powders:  Maqui Berry Powder   Turmeric Powder   Spirulina Powder   Glutamine Powder   Matcha Powder   Ruth fresh or powdered   Kale,  dandelion greens or spinach         Omega 3 and Protein Desired Toppings:   Add some healthy protein and omega 3 packed seeds for an extra special nutrient packed topping and a little extra crunch!   1 tsp -1 tbps ground flax seed   1 tsp -1 tbsp hemp seeds   1 tsp-1 tbsp satnam seeds         Anti-inflammatory Mediterranean Approach: Eat whole, unprocessed real foods in their unprocessed forms such as fruits, vegetables, whole grains (prefer gluten free), nuts, legumes, extra virgin olive oil, spices, modest amounts of poultry, and fish.      Avoid inflammatory foods: Eliminate gluten found in wheat, barley, rye, oats, kamut, and spelt for at least 3 weeks to identify any hidden reaction. Gluten sensitivity or allergy can cause many different types of symptoms form migraines to fatigue to weight gain.  If symptoms go away this is a clue you may be reactive to gluten.  Dairy can also be inflammatory consider eliminating for 3 weeks as well. The proteins like casein and whey in dairy can irritate and inflame your gut. Also the sugar lactase in dairy can cause digestive issues in addition to blood sugar spikes.     Food plan - 1400 calories per day     Protein 9-10 servings per day  - include at each meal to stabilize blood sugars   (Choose 3oz or 21g per meal and aim for 1oz of 7g for snacks)   - Strive for 1-2 servings of fish per week especially of higher omega-3 fatty acid containing fish such as salmon.     Legumes 1 serving per day     Dairy alternatives 1 serving per day     Nuts and seeds 2-3 servings per day - great to incorporate as snacks     Fats and oils 4 servings per day     Non starchy vegetables 7-8 servings per day     Starchy vegetable limit 1 serving per day as they tend to impact blood sugar (they are moderate-GI).     Fruits 2 servings per day - best to couple with a little bit of protein or fat to offset a rise in blood sugars (they are low-moderate-GI foods).     Whole grains <1 serving per day -  try gluten free whole grains instead       Incorporate protein powder daily:    Plant based hemp (recommended brands: [place], Nutiva, Just Hemp Protein, Feathr Red Mill)      Plant based pea (recommended brands: Naked Pea, Now Sports). If you want to try a combo of pea and hemp the brand Dai in vanilla or chocolate is a great option.     Try Bone broth protein powder or collagen peptides in liquid bone broth, vegetable broth or 12 oz of water as snack. The bone broth powder and collagen can be used for soups as well. This can help provide essential amino acids and minerals that heal your gut as well as balance blood sugars. A great option if you have a hard time tolerating solids.     Choose Low Glycemic (GI) foods: Regulate your sugar levels by eating foods that do not spike blood sugars.  Eat low -GI foods so only small fluctuations in blood glucose and insulin levels are produced.      Examples of low-GI foods: nuts, seeds, GF oats, most vegetables especially non-starchy and fruits.     Medium or high-GI foods should be eaten with a protein or fat, both of which blunt the glycemic effect of these foods. This reduces the overall glycemic impact of a meal.   Ex: Most grains and starchy veggies are medium/high GI.     Avoid foods containing refined sugars, artificial sweeteners, and refined grains they are considered high-GI because they lead to sharp increases in blood sugars levels, which increase insulin sensitivity causing increased TG, and low good cholesterol (HDL).   Ex: cakes, cookies, pies, bread, sodas, fruit drinks, presweetened tea, coffee drinks, energy or sport drinks, flavored milk and other processed foods.     Choose foods high in fiber: Aim for at least 5 grams of fiber per serving of food or a total of 25-35 grams fiber per day. Remember, when looking at the label, you can take the fiber away from the total carbs. Ex:15g of total carbs - 4g of fiber = 11g net carbs     Insoluble fiber  acts like a bulky  inner broom,  sweeping out debris from the intestine and creating more motility and movement.      Soluble fiber attracts water and swells, creating a gel that slows digestion.  Also, slows the release of glucose from foods into the blood which stops spikes in blood sugar levels.  Soluble fiber traps toxins in the gut, helping to carry them to excretion and provides healthy bacteria in the digestive tract.     Choose High Quality Fats: Adding anti-inflammatory fats into your diet such as fish (salmon, herring, mackerel, and sardines), omega 3 eggs, satnam seeds, ground flax seeds/milk, hemp seeds/milk, walnuts and some other certain leafy greens will increase omega-3 fats to omeaga-6 fats ratio.     Therapeutic fats both monounsaturated and polyunsaturated to include daily: ground flaxseeds, unsalted mixed nuts, avocados, olives, extra-virgin olive oil.     Emphasize high-quality oils and fats in the diet daily such as avocado oil, coconut oil, flaxseed, olive, sesame. Ex: 1 tsp to 1 tbsp of MCT oil from coconuts can be added into coffee, smoothies, and salad dressings per day.     Avoid trans fats found in processed foods     Drink more water. Hydration is critical, so drink at least six to eight glasses of water a day. Drink more water between meals and less at meals.     Try adding herbal teas (sugar free) or lemon/lime/cucumber/fruit to water for flavor. Avoid artificial sweetener packets to flavor your water.     Cut back on coffee switch to green tea. Avoid adding sugar and milk to coffee instead use dairy alternatives such as almond, flax, coconut milk.     Focus on high quality micro-nutrients.     One Multivitamin by Pure encapsulations - 1 capsule per day with food     Nordic Naturals Plus vitamin D - 1-2 capsules daily with food    Rebuild the friendly bacteria in your microbime. Start by taking a probiotic supplement, they will help rebuild the healthy bacteria so essential to good gut  health. Recolonization your digestive tract with healthy, beneficial good bacteria (probiotics). You can do that by taking very high-potency probiotics (look for at least 25 billion live CFU s from diversified strains of Lactobacillus, Bifidobacterium, and Saccharomyces boulardii), taken twice a day for one to two months.  Start slowly and observe how the probiotics affect your gut.  In some cases, certain individuals may need to delay probiotics until their gut is more intact.      Eat fermented foods. Include plenty of probiotic-rich foods like kimchi, kombucha, miso, or sauerkraut. Sometimes, you can also eat yogurt if you are not allergic to dairy. Try unsweetened sheep or goat yogurt. These are all foods that help your gut juve get and stay healthy.    In addition you can help establish healthy gut microflora by consuming foods that contain live active cultures ( probiotics ) such as kimchi,     Increase physical activity. Moving our body helps move our bowels and speeds up your metabolism.     Exercise 15-60 minutes daily--whether that looks like burst training, yoga, or vigorous walking.    Manage your stress. Stress can negatively impact the way you digest foods and absorb nutrients leading to more digestive issues (constipation, diarrhea, indigestion, nausea etc), imbalanced blood sugars and weight imbalances. Try to focus on the following relaxation techniques:     Regular exercise such as walking     Yoga    Meditation     Breathing techniques     Time management     Track what you eat. Writing down or tracking through an jurgen what you eat as well as how you feel acn help you identify patterns in your symptoms. This can help you become more aware and creat a diet that is right for you.     Pick a food tracking jurgen:     Through the mysymptoms jurgen, you can track symptoms, bowl movements, medications, stress, exercise, sleep and foods as well as beverages to become more mindful.  "[email or web address]://[email or web address]/wp/    Through the [employer] jurgen, you can focus more on calories and macronutrient's of foods to balance blood sugars or monitor weight. You can track blood sugars in the notes section along with symptoms, bowl movements, medications, stress, exercise, water intake and sleep.   Note: You don't have to journal forever and it is more important that you have consistent meals as well as snacks while focusing on adding in healthy foods.    NUTRITION RESOURCES:  1. Nicki Arauz's \"It Starts with the Egg: How the Science of Egg Quality Can Help You Get Pregnant Naturally, Prevent Miscarriage, and Improve Your Odds in IVF\"   2. IFM Elimination Packet     Functional Nutrition Fundamentals     Comprehensive Guide    Meal plan with recipes       "

## 2022-04-18 ENCOUNTER — OFFICE VISIT (OUTPATIENT)
Dept: OPTOMETRY | Facility: CLINIC | Age: 22
End: 2022-04-18
Payer: COMMERCIAL

## 2022-04-18 DIAGNOSIS — Z79.4 TYPE 2 DIABETES MELLITUS WITHOUT COMPLICATION, WITH LONG-TERM CURRENT USE OF INSULIN (H): ICD-10-CM

## 2022-04-18 DIAGNOSIS — Z01.00 EXAMINATION OF EYES AND VISION: Primary | ICD-10-CM

## 2022-04-18 DIAGNOSIS — H52.13 MYOPIA OF BOTH EYES: ICD-10-CM

## 2022-04-18 DIAGNOSIS — E11.9 TYPE 2 DIABETES MELLITUS WITHOUT COMPLICATION, WITH LONG-TERM CURRENT USE OF INSULIN (H): ICD-10-CM

## 2022-04-18 DIAGNOSIS — H52.223 REGULAR ASTIGMATISM OF BOTH EYES: ICD-10-CM

## 2022-04-18 DIAGNOSIS — Z83.511 FAMILY HISTORY OF GLAUCOMA: ICD-10-CM

## 2022-04-18 PROCEDURE — 92015 DETERMINE REFRACTIVE STATE: CPT | Performed by: OPTOMETRIST

## 2022-04-18 PROCEDURE — 92014 COMPRE OPH EXAM EST PT 1/>: CPT | Performed by: OPTOMETRIST

## 2022-04-18 ASSESSMENT — REFRACTION_CURRENTRX
OS_AXIS: 180
OS_DIAMETER: 14.5
OS_SPHERE: -4.25
OD_DIAMETER: 14.5
OD_CYLINDER: -1.25
OD_AXIS: 180
OD_SPHERE: -3.50
OD_BASECURVE: 8.70
OS_BASECURVE: 8.70
OS_CYLINDER: -0.75

## 2022-04-18 ASSESSMENT — KERATOMETRY
OD_K1POWER_DIOPTERS: 40.25
OD_AXISANGLE2_DEGREES: 180
OD_AXISANGLE_DEGREES: 090
METHOD_AUTO_MANUAL: AUTOMATED
OD_K2POWER_DIOPTERS: 42.25
OS_AXISANGLE_DEGREES: 086
OS_AXISANGLE2_DEGREES: 176
OS_K1POWER_DIOPTERS: 40.50
OS_K2POWER_DIOPTERS: 42.25

## 2022-04-18 ASSESSMENT — VISUAL ACUITY
CORRECTION_TYPE: GLASSES
OS_CC+: -2
OD_CC: 20/20
OD_SC: 20/400
OS_CC: 20/20
OS_SC: 20/400-
OS_CC: 20/20
OD_CC: 20/20
METHOD: SNELLEN - LINEAR
OD_CC+: -1

## 2022-04-18 ASSESSMENT — REFRACTION_MANIFEST
OD_AXIS: 092
OD_CYLINDER: +1.75
OS_SPHERE: -5.75
OS_AXIS: 084
OS_CYLINDER: +1.25
OD_SPHERE: -5.25

## 2022-04-18 ASSESSMENT — SLIT LAMP EXAM - LIDS
COMMENTS: NORMAL
COMMENTS: NORMAL

## 2022-04-18 ASSESSMENT — REFRACTION_WEARINGRX
OD_SPHERE: -5.00
OS_CYLINDER: +0.75
SPECS_TYPE: SVL
OD_AXIS: 092
OS_AXIS: 091
OD_CYLINDER: +1.50
OS_SPHERE: -5.25

## 2022-04-18 ASSESSMENT — TONOMETRY
IOP_METHOD: TONOPEN
OS_IOP_MMHG: 21
OD_IOP_MMHG: 20

## 2022-04-18 ASSESSMENT — CUP TO DISC RATIO
OS_RATIO: 0.3
OD_RATIO: 0.4

## 2022-04-18 ASSESSMENT — CONF VISUAL FIELD
OD_NORMAL: 1
OS_NORMAL: 1

## 2022-04-18 ASSESSMENT — EXTERNAL EXAM - LEFT EYE: OS_EXAM: NORMAL

## 2022-04-18 ASSESSMENT — EXTERNAL EXAM - RIGHT EYE: OD_EXAM: NORMAL

## 2022-04-18 NOTE — LETTER
4/18/2022         RE: Bev Whitaker Van  [address] [place] [address]        Dear Colleague,    Thank you for referring your patient, Bev Araujo, to the [facility]. Please see a copy of my visit note below.    Chief Complaint   Patient presents with     Annual Eye Exam     Accompanied by self  Chief Complaint(s) and History of Present Illness(es)     Diabetic Eye Exam     Vision: is stable    Diabetes Type: Type 2 and taking oral medications    Duration: 3 years    Blood Sugars: is controlled               Lab Results   Component Value Date    A1C 5.4 03/03/2022    A1C 5.6 12/02/2021    A1C 5.3 07/05/2021    A1C 5.7 03/31/2021    A1C 5.6 11/16/2020    A1C 6.6 08/13/2020    A1C 6.5 10/03/2018        Last Eye Exam: 1-  Dilated Previously: Yes    What are you currently using to see?  Glasses, sometimes contacts not often    Distance Vision Acuity: Satisfied with vision    Near Vision Acuity: Satisfied with vision while reading  with glasses    Eye Comfort: good  Do you use eye drops? : No  Occupation or Hobbies: assisted living     Family history of glaucoma- Mother/brother  Maternal grandmother- pseudoexfoliation glaucoma    Kelin Levin Optometric Assistant, A.B.O.C.     Medical, surgical and family histories reviewed and updated 4/18/2022.       OBJECTIVE: See Ophthalmology exam    ASSESSMENT:    ICD-10-CM    1. Examination of eyes and vision  Z01.00 EYE EXAM (SIMPLE-NONBILLABLE)   2. Type 2 diabetes mellitus without complication, with long-term current use of insulin (H)  E11.9 EYE EXAM (SIMPLE-NONBILLABLE)    Z79.4    3. Family history of glaucoma  Z83.511 EYE EXAM (SIMPLE-NONBILLABLE)   4. Myopia of both eyes  H52.13 REFRACTION   5. Regular astigmatism of both eyes  H52.223 REFRACTION       PLAN:    Bev Calvina Araujo aware  eye exam results will be sent to Linnea Mars.  Patient Instructions   There are not any  signs of the diabetes affecting the eyes today.  It is important that you get your eyes dilated once yearly and keep good control of your diabetes.    Monitor glaucoma status with yearly eye exams.    Eyeglass prescription given.    Return in 1 year for a complete eye exam or sooner if needed.    Willie Chang, OD               Again, thank you for allowing me to participate in the care of your patient.        Sincerely,        Willie Chang, OD

## 2022-04-18 NOTE — PATIENT INSTRUCTIONS
There are not any signs of the diabetes affecting the eyes today.  It is important that you get your eyes dilated once yearly and keep good control of your diabetes.    Monitor glaucoma status with yearly eye exams.    Eyeglass prescription given.    Return in 1 year for a complete eye exam or sooner if needed.    Willie Chang, STEPHANIA      The affects of the dilating drops last for 4- 6 hours.  You will be more sensitive to light and vision will be blurry up close.  Do not drive if you do not feel comfortable.  Mydriatic sunglasses were given if needed.    Patient Education   Diabetes weakens the blood vessels all over the body, including the eyes. Damage to the blood vessels in the eyes can cause swelling or bleeding into part of the eye (called the retina). This is called diabetic retinopathy (LEAH-tin-AH-puh-thee). If not treated, this disease can cause vision loss or blindness.   Symptoms may include blurred or distorted vision, but many people have no symptoms. It's important to see your eye doctor regularly to check for problems.   Early treatment and good control can help protect your vision. Here are the things you can do to help prevent vision loss:      1. Keep your blood sugar levels under tight control.      2. Bring high blood pressure under control.      3. No smoking.      4. Have yearly dilated eye exams.       Optometry Providers       Clinic Locations                                 Telephone Number   Dr. Kirti [place]/Justice  [place] [phone]     [facility] Hours:                [facility] Hours:       [facility] Hours:   [address]   [address]     [address] [place] [address]   [place] [address]    [place] [address]  Phone: [phone]                    Phone: [phone]     Phone: [phone]                      Monday  8:00-6:00                          Monday 8:00-6:00                          Monday 8:00-6:00              Tuesday 8:00-6:00                          Tuesday 8:00-6:00                          Tuesday 8:00-6:00              Wednesday 8:00-6:00                  Wednesday 8:00-6:00                   Wednesday 8:00-6:00      Thursday 8:00-6:00                        Thursday 8:00-6:00                         Thursday 8:00-6:00            Friday 8:00-5:00                              Friday 8:00-5:00                              Friday 8:00-5:00    [facility] Hours:   [address] [place] [address]  [phone]    Monday 9:00-6:00  Tuesday 9:00-6:00  Wednesday 9:00-6:00  Thursday 9:00-6:00  Friday 9:00-5:00  Please log on to [email or web address] to order your contact lenses.  The link is found on the [facility] page.  As always, Thank you for trusting us with your health care needs!

## 2022-04-18 NOTE — PROGRESS NOTES
Chief Complaint   Patient presents with     Annual Eye Exam     Accompanied by self  Chief Complaint(s) and History of Present Illness(es)     Diabetic Eye Exam     Vision: is stable    Diabetes Type: Type 2 and taking oral medications    Duration: 3 years    Blood Sugars: is controlled               Lab Results   Component Value Date    A1C 5.4 03/03/2022    A1C 5.6 12/02/2021    A1C 5.3 07/05/2021    A1C 5.7 03/31/2021    A1C 5.6 11/16/2020    A1C 6.6 08/13/2020    A1C 6.5 10/03/2018        Last Eye Exam: 1-  Dilated Previously: Yes    What are you currently using to see?  Glasses, sometimes contacts not often    Distance Vision Acuity: Satisfied with vision    Near Vision Acuity: Satisfied with vision while reading  with glasses    Eye Comfort: good  Do you use eye drops? : No  Occupation or Hobbies: assisted living     Family history of glaucoma- Mother/brother  Maternal grandmother- pseudoexfoliation glaucoma    Kelin Levin Optometric Assistant, A.B.O.C.     Medical, surgical and family histories reviewed and updated 4/18/2022.       OBJECTIVE: See Ophthalmology exam    ASSESSMENT:    ICD-10-CM    1. Examination of eyes and vision  Z01.00 EYE EXAM (SIMPLE-NONBILLABLE)   2. Type 2 diabetes mellitus without complication, with long-term current use of insulin (H)  E11.9 EYE EXAM (SIMPLE-NONBILLABLE)    Z79.4    3. Family history of glaucoma  Z83.511 EYE EXAM (SIMPLE-NONBILLABLE)   4. Myopia of both eyes  H52.13 REFRACTION   5. Regular astigmatism of both eyes  H52.223 REFRACTION       PLAN:    Bev Araujo aware  eye exam results will be sent to Linnea Mars.  Patient Instructions   There are not any signs of the diabetes affecting the eyes today.  It is important that you get your eyes dilated once yearly and keep good control of your diabetes.    Monitor glaucoma status with yearly eye exams.    Eyeglass prescription given.    Return in 1 year for a complete eye exam or sooner  if needed.    Willie Chang, OD

## 2022-04-27 ENCOUNTER — VIRTUAL VISIT (OUTPATIENT)
Dept: NUTRITION | Facility: CLINIC | Age: 22
End: 2022-04-27
Payer: COMMERCIAL

## 2022-04-27 DIAGNOSIS — Z71.3 DIETARY COUNSELING AND SURVEILLANCE: Primary | ICD-10-CM

## 2022-04-27 PROCEDURE — 99207 PR NO CHARGE LOS: CPT | Performed by: DIETITIAN, REGISTERED

## 2022-04-27 NOTE — PROGRESS NOTES
Medical Nutrition Therapy Consult No Show    At time of the appointment, I waited on [employer] for 10 minutes for patient to show. When did not show, I called patient [phone]. I received patients voicemail and left message regarding our nutrition appointment today and let her know I was here if she was having troubles connecting to [employer].  If interested in rescheduling patient can call [phone] to book another consult.       Altagracia Yap RD, CLT, LD  Integrative Registered Dietitian

## 2022-06-11 ENCOUNTER — HEALTH MAINTENANCE LETTER (OUTPATIENT)
Age: 22
End: 2022-06-11

## 2022-06-13 ENCOUNTER — LAB (OUTPATIENT)
Dept: LAB | Facility: CLINIC | Age: 22
End: 2022-06-13
Payer: COMMERCIAL

## 2022-06-13 DIAGNOSIS — E11.9 TYPE 2 DIABETES MELLITUS WITHOUT COMPLICATION, WITH LONG-TERM CURRENT USE OF INSULIN (H): Primary | ICD-10-CM

## 2022-06-13 DIAGNOSIS — Z79.4 TYPE 2 DIABETES MELLITUS WITHOUT COMPLICATION, WITH LONG-TERM CURRENT USE OF INSULIN (H): Primary | ICD-10-CM

## 2022-06-13 LAB — HBA1C MFR BLD: 5.6 % (ref 0–5.6)

## 2022-06-13 PROCEDURE — 36415 COLL VENOUS BLD VENIPUNCTURE: CPT

## 2022-06-13 PROCEDURE — 83036 HEMOGLOBIN GLYCOSYLATED A1C: CPT

## 2022-07-01 ENCOUNTER — TELEPHONE (OUTPATIENT)
Dept: FAMILY MEDICINE | Facility: CLINIC | Age: 22
End: 2022-07-01

## 2022-07-01 NOTE — TELEPHONE ENCOUNTER
Patient Quality Outreach    Patient is due for the following:   Cervical Cancer Screening - PAP Needed    NEXT STEPS:   No follow up needed at this time. Patient has a history of OBGYN visits.    Type of outreach:    Chart review performed, no outreach needed.      Questions for provider review:    None     Phyllis Gaspar

## 2022-07-28 NOTE — TELEPHONE ENCOUNTER
Left Voicemail (2nd Attempt) for the patient to call back and schedule the following:    Appointment type: Return Endo  Provider:  or Dr. Parker  Return date: early Dec 2022  Specialty phone number: [phone]  Additional appointment(s) needed: None  Additonal Notes: 2nd attempt; patient prefers Dr. Parker however, first available is Feb 2023    [facility]   Neurology, NeuroSurgery, NeuroPsychology and Pain Management Specialties  [phone]

## 2022-08-17 NOTE — TELEPHONE ENCOUNTER
Sent [employer] (1st Attempt) for the patient to call back and schedule the following:    Appointment type: Return Endo  Provider:   Return date: 12/2022  Specialty phone number: [phone]  Additional appointment(s) needed: None  Additonal Notes: 3rd attempt    [facility]   Neurology, NeuroSurgery, NeuroPsychology and Pain Management Specialties  [phone]

## 2022-08-26 ENCOUNTER — OFFICE VISIT (OUTPATIENT)
Dept: OBGYN | Facility: CLINIC | Age: 22
End: 2022-08-26
Payer: COMMERCIAL

## 2022-08-26 VITALS
SYSTOLIC BLOOD PRESSURE: 130 MMHG | HEIGHT: 68 IN | OXYGEN SATURATION: 94 % | BODY MASS INDEX: 44.41 KG/M2 | DIASTOLIC BLOOD PRESSURE: 89 MMHG | WEIGHT: 293 LBS | HEART RATE: 67 BPM

## 2022-08-26 DIAGNOSIS — Z11.3 SCREENING FOR STD (SEXUALLY TRANSMITTED DISEASE): ICD-10-CM

## 2022-08-26 DIAGNOSIS — Z11.3 SCREEN FOR STD (SEXUALLY TRANSMITTED DISEASE): ICD-10-CM

## 2022-08-26 DIAGNOSIS — Z31.69 PRE-CONCEPTION COUNSELING: ICD-10-CM

## 2022-08-26 DIAGNOSIS — E28.2 PCOS (POLYCYSTIC OVARIAN SYNDROME): ICD-10-CM

## 2022-08-26 DIAGNOSIS — Z12.4 SCREENING FOR CERVICAL CANCER: ICD-10-CM

## 2022-08-26 DIAGNOSIS — Z00.00 ANNUAL PHYSICAL EXAM: Primary | ICD-10-CM

## 2022-08-26 DIAGNOSIS — Z11.3 SCREENING FOR STD (SEXUALLY TRANSMITTED DISEASE): Primary | ICD-10-CM

## 2022-08-26 LAB
FSH SERPL IRP2-ACNC: 5.3 MIU/ML
LH SERPL-ACNC: 2.4 MIU/ML

## 2022-08-26 PROCEDURE — 36415 COLL VENOUS BLD VENIPUNCTURE: CPT | Performed by: OBSTETRICS & GYNECOLOGY

## 2022-08-26 PROCEDURE — 83002 ASSAY OF GONADOTROPIN (LH): CPT | Performed by: OBSTETRICS & GYNECOLOGY

## 2022-08-26 PROCEDURE — 99395 PREV VISIT EST AGE 18-39: CPT | Performed by: OBSTETRICS & GYNECOLOGY

## 2022-08-26 PROCEDURE — 83001 ASSAY OF GONADOTROPIN (FSH): CPT | Performed by: OBSTETRICS & GYNECOLOGY

## 2022-08-26 PROCEDURE — 87591 N.GONORRHOEAE DNA AMP PROB: CPT | Performed by: OBSTETRICS & GYNECOLOGY

## 2022-08-26 PROCEDURE — G0145 SCR C/V CYTO,THINLAYER,RESCR: HCPCS | Performed by: OBSTETRICS & GYNECOLOGY

## 2022-08-26 PROCEDURE — 87491 CHLMYD TRACH DNA AMP PROBE: CPT | Performed by: OBSTETRICS & GYNECOLOGY

## 2022-08-26 NOTE — PROGRESS NOTES
Bev is a 22 year old female, , LMP 2022, who is here for her annual exam.  She plans to stop use of Micronor in 1.5 months since she prefers use of condoms/a nonhormonal method in the near future.  She then may decide to try for pregnancy and preconceptual counseling was provided.  Her labwork is updated but she would like to be checked for possible PCOS.  She was advised to start taking a PNV daily po.     ROS: Ten point review of systems was reviewed and negative except the above.    Health Maintenance   Topic Date Due     Pneumococcal Vaccine: Pediatrics (0 to 5 Years) and At-Risk Patients (6 to 64 Years) (2 - PCV) 2021     PAP  2022     CHLAMYDIA SCREENING  2022     INFLUENZA VACCINE (1) 2022     A1C  2022     MICROALBUMIN  2022     PREVENTIVE CARE VISIT  2023     BMP  2023     LIPID  2023     DIABETIC FOOT EXAM  2023     EYE EXAM  2023     ADVANCE CARE PLANNING  2027     DTAP/TDAP/TD IMMUNIZATION (5 - Td or Tdap) 2030     HEPATITIS C SCREENING  Completed     HIV SCREENING  Completed     PHQ-2 (once per calendar year)  Completed     IPV IMMUNIZATION  Completed     HPV IMMUNIZATION  Completed     HEPATITIS B IMMUNIZATION  Completed     COVID-19 Vaccine  Completed     MENINGITIS IMMUNIZATION  Aged Out      Last pap: due 2 months ago  Last Mammogram: not due  Last Dexa: not due  Last Colonoscopy: not due  Lab Results   Component Value Date    CHOL 138 2022    CHOL 196 2020     Lab Results   Component Value Date    HDL 39 2022    HDL 40 2020     Lab Results   Component Value Date    LDL 75 2022     2020     Lab Results   Component Value Date    TRIG 119 2022    TRIG 250 2020     No results found for: CHOLHDLRATIO    OBHX:      PSH: History reviewed. No pertinent surgical history.    PMH: Her past medical, surgical, and obstetric histories were reviewed and are documented  "in their appropriate chart areas.    ALL/Meds: Her medication and allergy histories were reviewed and are documented in their appropriate chart areas.    SH/FMH: Her social and family history was reviewed and documented in its appropriate chart area.    PE: /89 (BP Location: Right arm, Patient Position: Sitting, Cuff Size: Adult Large)   Pulse 67   Ht 1.734 m (5' 8.25\")   Wt 140.3 kg (309 lb 4.8 oz)   LMP 08/23/2022 (Exact Date)   SpO2 94%   BMI 46.69 kg/m    Body mass index is 46.69 kg/m .    General Appearance:  healthy, alert, active, no distress  Cardiovascular:  Regular rate and Rhythm without murmur  Neck: Supple, no adenopathy and thyroid normal  Lungs:  Clear, without wheeze, rale or rhonchi  Breast: normal breast exam  Abdomen: Benign, Soft, flat, non-tender, No masses, organomegaly, No inguinal nodes and Bowel sounds normoactive.   Pelvic:       - Ext: Vulva and perineum are normal without lesion, mass or discharge        - Urethra: normal without discharge       - Urethral Meatus: normal appearance       - Bladder: no tenderness, no masses       - Vagina: Normal mucosa, currently on her menses       - Cervix: normal and nulliparous       - Uterus:Normal shape, position and consistency        - Adnexa: Normal without masses or tenderness       - Rectal: deferred    A/P:  Well Woman Exam, Suspect PCOS, Pre-Conceptual Consult     -  I discussed the new pap recommendations regarding screening.  Explained the rationale for increased intervals between paps.  Questions asked and answered.  She does agree to this regiment.  Pap was collected and submitted.  She was screened for GC/Chlamydia due to her age but no exposure risks.  She is getting  in October 2022.   -  BC: condoms per patient preference   -  She is to start taking a PNV daily po   -  Will check a FSH and LH plus a pelvic US to evaluate for possible PCOS.  This condition was discussed and she was also provided an ACOG pamphlet " regarding this.     -  She declines a refill of Micronor.    - Her labwork is up-to-date.  Orders Placed This Encounter   Procedures     US Pelvic Transabdominal and Transvaginal     Follicle stimulating hormone     Luteinizing Hormone, Adult      -  Encouraged self-breast exam   -  Encouraged low fat diet, regular exercise, and adequate calcium intake.    Linnea Mars, DO  FACOG, FACS

## 2022-08-26 NOTE — PATIENT INSTRUCTIONS
If you have any questions regarding your visit, Please contact your care team.    [employer] Services: [phone]    To Schedule an Appointment 24/7  Call: [phone] TELEPHONE NUMBER   Linnea Mars DO.    MARGARITA Pablo -Surgery Scheduler  Maryana - Surgery Scheduler    Jennifer, DESTIN Up, RN  Klarissa, DESTIN   [place]  Wednesday and Friday  8:30 a.m-5:00 p.m    [place]-Temporary  Monday 8:30 a.m-5:00 p.m  Typical Surgery day:  Tuesday [facility]  [address] [place] [address] [phone] Phone  [phone] Fax    Imaging Scheduling-All Locations [phone]    [facility]  [address] [place] [address]     Urgent Care locations:  [facility] Monday-Friday   10 am - 8 pm  Saturday and Sunday   9 am - 5 pm [phone]   **Surgeries** Our Surgery Schedulers will contact you to schedule. If you do not receive a call within 3 business days, please call [phone].    [facility] Labor and Delivery:  [phone]    If you need a medication refill, please contact your pharmacy. Please allow 3 business days for your refill to be completed.  As always, Thank you for trusting us with your healthcare needs!  see additional instructions from your care team below

## 2022-08-27 LAB
C TRACH DNA SPEC QL PROBE+SIG AMP: NEGATIVE
N GONORRHOEA DNA SPEC QL NAA+PROBE: NEGATIVE

## 2022-08-30 ENCOUNTER — ANCILLARY PROCEDURE (OUTPATIENT)
Dept: ULTRASOUND IMAGING | Facility: CLINIC | Age: 22
End: 2022-08-30
Attending: OBSTETRICS & GYNECOLOGY
Payer: COMMERCIAL

## 2022-08-30 DIAGNOSIS — E28.2 PCOS (POLYCYSTIC OVARIAN SYNDROME): ICD-10-CM

## 2022-08-30 LAB
BKR LAB AP GYN ADEQUACY: NORMAL
BKR LAB AP GYN INTERPRETATION: NORMAL
BKR LAB AP HPV REFLEX: NO
BKR LAB AP LMP: NORMAL
BKR LAB AP PREVIOUS ABNORMAL: NORMAL
PATH REPORT.COMMENTS IMP SPEC: NORMAL
PATH REPORT.COMMENTS IMP SPEC: NORMAL
PATH REPORT.RELEVANT HX SPEC: NORMAL

## 2022-08-30 PROCEDURE — 76856 US EXAM PELVIC COMPLETE: CPT | Performed by: RADIOLOGY

## 2022-08-30 PROCEDURE — 76830 TRANSVAGINAL US NON-OB: CPT | Performed by: RADIOLOGY

## 2022-09-06 DIAGNOSIS — Z30.09 GENERAL COUNSELING FOR PRESCRIPTION OF ORAL CONTRACEPTIVES: ICD-10-CM

## 2022-09-06 RX ORDER — ACETAMINOPHEN AND CODEINE PHOSPHATE 120; 12 MG/5ML; MG/5ML
0.35 SOLUTION ORAL DAILY
Qty: 84 TABLET | Refills: 0 | Status: SHIPPED | OUTPATIENT
Start: 2022-09-06 | End: 2023-02-01

## 2022-09-06 NOTE — TELEPHONE ENCOUNTER
"Requested Prescriptions   Pending Prescriptions Disp Refills     norethindrone (MICRONOR) 0.35 MG tablet 84 tablet 3     Sig: Take 1 tablet (0.35 mg) by mouth daily       Contraceptives Protocol Passed - 9/6/2022  9:51 AM        Passed - Patient is not a current smoker if age is 35 or older        Passed - Recent (12 mo) or future (30 days) visit within the authorizing provider's specialty     Patient has had an office visit with the authorizing provider or a provider within the authorizing providers department within the previous 12 mos or has a future within next 30 days. See \"Patient Info\" tab in inbasket, or \"Choose Columns\" in Meds & Orders section of the refill encounter.              Passed - Medication is active on med list        Passed - No active pregnancy on record        Passed - No positive pregnancy test in past 12 months           Prescription approved per [facility] Refill Protocol.    Pt is due for an annual visit.  Sent mychart reminder.    Medication is being filled for 1 time refill only due to:  Patient needs to be seen because it has been more than one year since last visit.      Jennifer Esposito RN        "

## 2022-09-13 ENCOUNTER — TRANSFERRED RECORDS (OUTPATIENT)
Dept: HEALTH INFORMATION MANAGEMENT | Facility: CLINIC | Age: 22
End: 2022-09-13

## 2022-09-28 ENCOUNTER — TRANSFERRED RECORDS (OUTPATIENT)
Dept: HEALTH INFORMATION MANAGEMENT | Facility: CLINIC | Age: 22
End: 2022-09-28

## 2022-10-11 ENCOUNTER — TRANSFERRED RECORDS (OUTPATIENT)
Dept: HEALTH INFORMATION MANAGEMENT | Facility: CLINIC | Age: 22
End: 2022-10-11

## 2022-10-22 ENCOUNTER — HEALTH MAINTENANCE LETTER (OUTPATIENT)
Age: 22
End: 2022-10-22

## 2022-11-25 NOTE — PROGRESS NOTES
Outcome for 11/25/22 8:03 AM: Patient Communicator message sent  CHILO Fox  Outcome for 11/29/22 10:13 AM: Glucose Readings sent via Patient Communicator  CHILO Fox      Video-Visit Details    Type of service:  Video Visit    Start: 09/04/2020 9:30 am   Stop: 09/04/2020 9:50  am     Originating Location (pt. Location): Home    Distant Location (provider location):  [facility]     Platform used for Video Visit: Well                                                                                 - Endocrinology follow up -    Reason for visit/consult: DM2, overweight    Primary care provider: Linnea Mars      Assessment and Plan  22 year old female with DM2, A1C 6.6, and overweight    DM2  Reveiwed glucose data, has been good range 110-120s.     - A1C     - continue Victoza 1.8 mg daily.     - continue  Metformin 500 mg BID    Overweight    - referral to weight management clinic, now trying dairy free diet,   Need follow up      - this time we will also do 1 mg dexamethasone test      DM complication  Urine microalbumin mildly + since 2018.     Regular menses  She is off BCP for 2 months     RTC with me in 1 year.       30  minutes spent on the date of the encounter doing chart review, history and exam, documentation and further activities as noted above.    Vy Rajan MD  Staff Physician  Endocrinology and Metabolism  [facility]  License: [place] [id number]  Pager: [phone]    Interval History as of 12/2/2022 : Patient has been doing well . Medication compliance excellent  .no complaint today.   Interval History as of 12/3/2021 : Patient has been doing well.. Medication compliance: well toleratign to victosa with full dose over 1 year, she lost 12 lb and rebounded and now total 10 lb loss   . No hirsutism, regular menses .  HPI: A 19 yo female here for the evaluation of her diabetes.   Patient was diagnosed diabetes 2 years ago with elevated A1c 6.5.  She has been trying diet  and exercise.   This year she cut down significant amount of soda in which she limit amount of sweets and chocolate.  She rarely eat fast food.  Follow-up A1c in August 2020 was 6.6.  Never tried any medication or insulin.  She has family history of diabetes her older brother has diabetes diagnosed age 15, maternal grandfather has diabetes, paternal grandmother has diabetes, paternal uncle has diabetes.  Her body weight is 320 pounds and height 5 8, her body weight has been stable for the past few years.   She was also told elevated blood pressure but otherwise no medical history.  Her menarche was age 13-16 menstrual cycle regular taking birth control pill for the past 4 years.     Current Diabetic Regimens:  victoza 1.8 mg daily  Metformin 500 mg BID        Life Style: evening shift  Wake up 10 am  Breakfast  Lunch   Dinner  Bedtime    Exercise:  At work lifting patients    DM complications:  Retinopathy:   Nephropathy:   Neuropathy:   Most recent LDL:   LDL Cholesterol Calculated   Date Value Ref Range Status   03/03/2022 75 <=100 mg/dL Final   08/13/2020 106 (H) <100 mg/dL Final     Comment:     Above desirable:  100-129 mg/dl  Borderline High:  130-159 mg/dL  High:             160-189 mg/dL  Very high:       >189 mg/dl         Glucose Log: We downloaded glucometer and analyzed and summarize here.  : 110-120s througout the day    A1C trends from previous labs:   Hemoglobin A1C   Date Value Ref Range Status   06/13/2022 5.6 0.0 - 5.6 % Final     Comment:     Normal <5.7%   Prediabetes 5.7-6.4%    Diabetes 6.5% or higher     Note: Adopted from ADA consensus guidelines.   03/03/2022 5.4 0.0 - 5.6 % Final     Comment:     Normal <5.7%   Prediabetes 5.7-6.4%    Diabetes 6.5% or higher     Note: Adopted from ADA consensus guidelines.   12/02/2021 5.6 0.0 - 5.6 % Final     Comment:     Normal <5.7%   Prediabetes 5.7-6.4%    Diabetes 6.5% or higher     Note: Adopted from ADA consensus guidelines.   07/05/2021 5.3 0 -  5.6 % Final     Comment:     Normal <5.7% Prediabetes 5.7-6.4%  Diabetes 6.5% or higher - adopted from ADA   consensus guidelines.     03/31/2021 5.7 (H) 0 - 5.6 % Final     Comment:     Normal <5.7% Prediabetes 5.7-6.4%  Diabetes 6.5% or higher - adopted from ADA   consensus guidelines.     11/16/2020 5.6 0 - 5.6 % Final     Comment:     Normal <5.7% Prediabetes 5.7-6.4%  Diabetes 6.5% or higher - adopted from ADA   consensus guidelines.     08/13/2020 6.6 (H) 0 - 5.6 % Final     Comment:     Normal <5.7% Prediabetes 5.7-6.4%  Diabetes 6.5% or higher - adopted from ADA   consensus guidelines.     10/03/2018 6.5 (H) 0 - 5.6 % Final     Comment:     Normal <5.7% Prediabetes 5.7-6.4%  Diabetes 6.5% or higher - adopted from ADA   consensus guidelines.          Past Medical/Surgical History:  Past Medical History:   Diagnosis Date     Chronic kidney disease      Diabetes (H)     prediabetic     No past surgical history on file.    Allergies:  No Known Allergies    Current Medications   Current Outpatient Medications   Medication     blood glucose (NO BRAND SPECIFIED) lancets standard     blood glucose monitoring (NO BRAND SPECIFIED) meter device kit     Continuous Blood Gluc  (FREESTYLE ANNE MARIE 2 READER) JOSESITO     hydrOXYzine (ATARAX) 25 MG tablet     liraglutide (VICTOZA PEN) 18 MG/3ML solution     metFORMIN (GLUCOPHAGE) 500 MG tablet     Multiple Vitamins-Minerals (MULTIVITAMIN ADULTS PO)     naproxen (NAPROSYN DR) 500 MG EC tablet     norethindrone (MICRONOR) 0.35 MG tablet     ONETOUCH ULTRA test strip     Probiotic Product (PROBIOTIC DAILY PO)     No current facility-administered medications for this visit.       Family History:  Family History   Problem Relation Age of Onset     Hypertension Mother      Glaucoma Mother      Diabetes Maternal Grandfather      Diabetes Paternal Grandmother      Hypertension Paternal Grandmother      Diabetes Brother      Glaucoma Brother      Heart Disease Father      Other  Cancer Maternal Grandmother         ovarian     Cancer Maternal Grandmother      Glaucoma Maternal Grandmother      Macular Degeneration Maternal Grandmother        Social History:  Social History     Tobacco Use     Smoking status: Never     Smokeless tobacco: Never     Tobacco comments:     Dad smokes outside   Substance Use Topics     Alcohol use: Yes     Comment: occ   works full time assisted living.     ROS:  Full review of systems taken with the help of the intake sheet. Otherwise a complete 14 point review of systems was taken and is negative unless stated in the history above.      Physical Exam:   Vitals: There were no vitals taken for this visit.  BMI= There is no height or weight on file to calculate BMI.   General: well appearing, no acute distress, pleasant and conversant,   Mental Status/neuro: alert and oriented  Face: symmetrical, normal facial color  Eyes: anicteric, no proptosis or lid lag  Resp: no acute ditress      Labs : I reviewed data from epic and extract and summarize the pertinent data here.   Lab Results   Component Value Date     10/03/2018      Lab Results   Component Value Date    POTASSIUM 4.1 10/03/2018     Lab Results   Component Value Date    CHLORIDE 110 10/03/2018     Lab Results   Component Value Date    SEGUN 8.8 10/03/2018     Lab Results   Component Value Date    CO2 25 10/03/2018     Lab Results   Component Value Date    BUN 13 10/03/2018     Lab Results   Component Value Date    CR 0.55 10/03/2018     Lab Results   Component Value Date     08/13/2020     Lab Results   Component Value Date    TSH 1.09 08/13/2020     No results found for: T4  Lab Results   Component Value Date    A1C 6.6 08/13/2020       No results found for: IGF1  No results found for: LH  No results found for: FSH  No results found for: ESTROGEN  No results found for: PROLACTIN

## 2022-12-02 ENCOUNTER — VIRTUAL VISIT (OUTPATIENT)
Dept: ENDOCRINOLOGY | Facility: CLINIC | Age: 22
End: 2022-12-02
Payer: COMMERCIAL

## 2022-12-02 DIAGNOSIS — E66.01 MORBID OBESITY (H): ICD-10-CM

## 2022-12-02 DIAGNOSIS — E11.9 TYPE 2 DIABETES MELLITUS WITHOUT COMPLICATION, UNSPECIFIED WHETHER LONG TERM INSULIN USE (H): ICD-10-CM

## 2022-12-02 DIAGNOSIS — E11.9 TYPE 2 DIABETES MELLITUS WITHOUT COMPLICATION, WITHOUT LONG-TERM CURRENT USE OF INSULIN (H): Primary | ICD-10-CM

## 2022-12-02 DIAGNOSIS — Z79.4 TYPE 2 DIABETES MELLITUS WITHOUT COMPLICATION, WITH LONG-TERM CURRENT USE OF INSULIN (H): ICD-10-CM

## 2022-12-02 DIAGNOSIS — E11.9 TYPE 2 DIABETES MELLITUS WITHOUT COMPLICATION, WITH LONG-TERM CURRENT USE OF INSULIN (H): ICD-10-CM

## 2022-12-02 PROCEDURE — 99214 OFFICE O/P EST MOD 30 MIN: CPT | Mod: 95 | Performed by: INTERNAL MEDICINE

## 2022-12-02 RX ORDER — DEXAMETHASONE 1 MG
1 TABLET ORAL ONCE
Qty: 1 TABLET | Refills: 0 | Status: SHIPPED | OUTPATIENT
Start: 2022-12-02 | End: 2023-02-01

## 2022-12-02 RX ORDER — LIRAGLUTIDE 6 MG/ML
INJECTION SUBCUTANEOUS
Qty: 27 ML | Refills: 3 | Status: SHIPPED | OUTPATIENT
Start: 2022-12-02 | End: 2024-01-25 | Stop reason: ALTCHOICE

## 2022-12-02 ASSESSMENT — ENCOUNTER SYMPTOMS
BLOOD IN STOOL: 0
RECTAL PAIN: 1
DIARRHEA: 1
PANIC: 0
INSOMNIA: 1
CONSTIPATION: 0
ABDOMINAL PAIN: 0
HEARTBURN: 0
DEPRESSION: 1
JAUNDICE: 0
DECREASED LIBIDO: 0
HOT FLASHES: 0
BOWEL INCONTINENCE: 0
BLOATING: 1
NAUSEA: 1
VOMITING: 0
DECREASED CONCENTRATION: 1
NERVOUS/ANXIOUS: 1

## 2022-12-02 NOTE — LETTER
12/2/2022         RE: Bev Araujo  [address] [place] [address]        Dear Colleague,    Thank you for referring your patient, Bev Araujo, to the [facility]. Please see a copy of my visit note below.    Outcome for 11/25/22 8:03 AM: The [email or web address] message sent  CHILO Fox  Outcome for 11/29/22 10:13 AM: Glucose Readings sent via [employer]  CHILO Fox      Video-Visit Details    Type of service:  Video Visit    Start: 09/04/2020 9:30 am   Stop: 09/04/2020 9:50  am     Originating Location (pt. Location): Home    Distant Location (provider location):  [facility]     Platform used for Video Visit: Well                                                                                 - Endocrinology follow up -    Reason for visit/consult: DM2, overweight    Primary care provider: Linnea Mars      Assessment and Plan  22 year old female with DM2, A1C 6.6, and overweight    DM2  Reveiwed glucose data, has been good range 110-120s.     - A1C     - continue Victoza 1.8 mg daily.     - continue  Metformin 500 mg BID    Overweight    - referral to weight management clinic, now trying dairy free diet,   Need follow up      - this time we will also do 1 mg dexamethasone test      DM complication  Urine microalbumin mildly + since 2018.     Regular menses  She is off BCP for 2 months     RTC with me in 1 year.       30  minutes spent on the date of the encounter doing chart review, history and exam, documentation and further activities as noted above.    Vy Rajan MD  Staff Physician  Endocrinology and Metabolism  [facility]  License: [place] [id number]  Pager: [phone]    Interval History as of 12/2/2022 : Patient has been doing well . Medication compliance excellent  .no complaint today.   Interval History as of 12/3/2021 : Patient has been doing well.. Medication compliance: well toleratign  to victosa with full dose over 1 year, she lost 12 lb and rebounded and now total 10 lb loss   . No hirsutism, regular menses .  HPI: A 21 yo female here for the evaluation of her diabetes.   Patient was diagnosed diabetes 2 years ago with elevated A1c 6.5.  She has been trying diet and exercise.   This year she cut down significant amount of soda in which she limit amount of sweets and chocolate.  She rarely eat fast food.  Follow-up A1c in August 2020 was 6.6.  Never tried any medication or insulin.  She has family history of diabetes her older brother has diabetes diagnosed age 15, maternal grandfather has diabetes, paternal grandmother has diabetes, paternal uncle has diabetes.  Her body weight is 320 pounds and height 5 8, her body weight has been stable for the past few years.   She was also told elevated blood pressure but otherwise no medical history.  Her menarche was age [age] menstrual cycle regular taking birth control pill for the past 4 years.     Current Diabetic Regimens:  victoza 1.8 mg daily  Metformin 500 mg BID        Life Style: evening shift  Wake up 10 am  Breakfast  Lunch   Dinner  Bedtime    Exercise:  At work lifting patients    DM complications:  Retinopathy:   Nephropathy:   Neuropathy:   Most recent LDL:   LDL Cholesterol Calculated   Date Value Ref Range Status   03/03/2022 75 <=100 mg/dL Final   08/13/2020 106 (H) <100 mg/dL Final     Comment:     Above desirable:  100-129 mg/dl  Borderline High:  130-159 mg/dL  High:             160-189 mg/dL  Very high:       >189 mg/dl         Glucose Log: We downloaded glucometer and analyzed and summarize here.  : 110-120s througout the day    A1C trends from previous labs:   Hemoglobin A1C   Date Value Ref Range Status   06/13/2022 5.6 0.0 - 5.6 % Final     Comment:     Normal <5.7%   Prediabetes 5.7-6.4%    Diabetes 6.5% or higher     Note: Adopted from ADA consensus guidelines.   03/03/2022 5.4 0.0 - 5.6 % Final     Comment:     Normal <5.7%    Prediabetes 5.7-6.4%    Diabetes 6.5% or higher     Note: Adopted from ADA consensus guidelines.   12/02/2021 5.6 0.0 - 5.6 % Final     Comment:     Normal <5.7%   Prediabetes 5.7-6.4%    Diabetes 6.5% or higher     Note: Adopted from ADA consensus guidelines.   07/05/2021 5.3 0 - 5.6 % Final     Comment:     Normal <5.7% Prediabetes 5.7-6.4%  Diabetes 6.5% or higher - adopted from ADA   consensus guidelines.     03/31/2021 5.7 (H) 0 - 5.6 % Final     Comment:     Normal <5.7% Prediabetes 5.7-6.4%  Diabetes 6.5% or higher - adopted from ADA   consensus guidelines.     11/16/2020 5.6 0 - 5.6 % Final     Comment:     Normal <5.7% Prediabetes 5.7-6.4%  Diabetes 6.5% or higher - adopted from ADA   consensus guidelines.     08/13/2020 6.6 (H) 0 - 5.6 % Final     Comment:     Normal <5.7% Prediabetes 5.7-6.4%  Diabetes 6.5% or higher - adopted from ADA   consensus guidelines.     10/03/2018 6.5 (H) 0 - 5.6 % Final     Comment:     Normal <5.7% Prediabetes 5.7-6.4%  Diabetes 6.5% or higher - adopted from ADA   consensus guidelines.          Past Medical/Surgical History:  Past Medical History:   Diagnosis Date     Chronic kidney disease      Diabetes (H)     prediabetic     No past surgical history on file.    Allergies:  No Known Allergies    Current Medications   Current Outpatient Medications   Medication     blood glucose (NO BRAND SPECIFIED) lancets standard     blood glucose monitoring (NO BRAND SPECIFIED) meter device kit     Continuous Blood Gluc  (FREESTYLE ANNE MARIE 2 READER) JOSESITO     hydrOXYzine (ATARAX) 25 MG tablet     liraglutide (VICTOZA PEN) 18 MG/3ML solution     metFORMIN (GLUCOPHAGE) 500 MG tablet     Multiple Vitamins-Minerals (MULTIVITAMIN ADULTS PO)     naproxen (NAPROSYN DR) 500 MG EC tablet     norethindrone (MICRONOR) 0.35 MG tablet     ONETOUCH ULTRA test strip     Probiotic Product (PROBIOTIC DAILY PO)     No current facility-administered medications for this visit.       Family  History:  Family History   Problem Relation Age of Onset     Hypertension Mother      Glaucoma Mother      Diabetes Maternal Grandfather      Diabetes Paternal Grandmother      Hypertension Paternal Grandmother      Diabetes Brother      Glaucoma Brother      Heart Disease Father      Other Cancer Maternal Grandmother         ovarian     Cancer Maternal Grandmother      Glaucoma Maternal Grandmother      Macular Degeneration Maternal Grandmother        Social History:  Social History     Tobacco Use     Smoking status: Never     Smokeless tobacco: Never     Tobacco comments:     Dad smokes outside   Substance Use Topics     Alcohol use: Yes     Comment: occ   works full time assisted living.     ROS:  Full review of systems taken with the help of the intake sheet. Otherwise a complete 14 point review of systems was taken and is negative unless stated in the history above.      Physical Exam:   Vitals: There were no vitals taken for this visit.  BMI= There is no height or weight on file to calculate BMI.   General: well appearing, no acute distress, pleasant and conversant,   Mental Status/neuro: alert and oriented  Face: symmetrical, normal facial color  Eyes: anicteric, no proptosis or lid lag  Resp: no acute ditress      Labs : I reviewed data from epic and extract and summarize the pertinent data here.   Lab Results   Component Value Date     10/03/2018      Lab Results   Component Value Date    POTASSIUM 4.1 10/03/2018     Lab Results   Component Value Date    CHLORIDE 110 10/03/2018     Lab Results   Component Value Date    SEGUN 8.8 10/03/2018     Lab Results   Component Value Date    CO2 25 10/03/2018     Lab Results   Component Value Date    BUN 13 10/03/2018     Lab Results   Component Value Date    CR 0.55 10/03/2018     Lab Results   Component Value Date     08/13/2020     Lab Results   Component Value Date    TSH 1.09 08/13/2020     No results found for: T4  Lab Results   Component Value Date     A1C 6.6 08/13/2020       No results found for: IGF1  No results found for: LH  No results found for: FSH  No results found for: ESTROGEN  No results found for: PROLACTIN                Again, thank you for allowing me to participate in the care of your patient.        Sincerely,        Vy Rajan MD

## 2022-12-07 ENCOUNTER — VIRTUAL VISIT (OUTPATIENT)
Dept: NUTRITION | Facility: CLINIC | Age: 22
End: 2022-12-07
Payer: COMMERCIAL

## 2022-12-07 DIAGNOSIS — E11.9 TYPE 2 DIABETES MELLITUS WITHOUT COMPLICATION, WITH LONG-TERM CURRENT USE OF INSULIN (H): Primary | ICD-10-CM

## 2022-12-07 DIAGNOSIS — Z79.4 TYPE 2 DIABETES MELLITUS WITHOUT COMPLICATION, WITH LONG-TERM CURRENT USE OF INSULIN (H): Primary | ICD-10-CM

## 2022-12-07 DIAGNOSIS — E66.01 MORBID OBESITY WITH BMI OF 45.0-49.9, ADULT (H): ICD-10-CM

## 2022-12-07 PROCEDURE — 97803 MED NUTRITION INDIV SUBSEQ: CPT | Mod: 95 | Performed by: DIETITIAN, REGISTERED

## 2022-12-07 NOTE — PROGRESS NOTES
Medical Nutrition Therapy  Visit Type: Reassessment and intervention    Visit Details    How would you like to obtain your AVS? MyChart  If the correspondence for visit is dropped, how would you like your dietitian to reconnect with you:   call back by phone? Yes    Text to cell phone: [phone]  Will anyone else be joining your video visit or telephone call? No  {If patient encounters technical issues they should call [phone] :66    Type of service:  Video Visit     Start Time: 11:05 AM    End Time:11:45 AM    Originating Location (pt. Location): Home    Distant Location (provider location):  [facility] WORKING VIRTUAL FROM HOME     Platform used for Video Visit: [email or web address]      Referring Provider: Vy Rajan MD       REASON FOR REFERRAL:   Bve Araujo is a 21 year old female who is interested in Medical Nutrition Therapy (MNT) and education related to weight management.   She is accompanied by self.     Bev Araujo is a 22 year old female who presents today for new weight management nutrition consultation.    Changes since previous consult Yes        Behavior Status:Improvement shown  Barriers Include:consistency, sticking to changes long term, meal planning, prepping and cooking to stay on track    Goals: What would you like to work on that will help you most over the next several weeks? Meal planning and getting back to cutting out more wheat/gluten and dairy that has slipped back in. Picking just a few new meals per week to make and getting back to the healthy breakfast and snack ideas.     She has been doing gluten and dairy free for until Oct 1st she lost about 15lbs. She just started eating the normal stuff she ate before being gluten and dairy free. She was writing what she was feeling and noticed that the dairy made her feel bloated. It all came back when she started and failed at being able to focus on it.     She wants to watch what  she eats when shopping at the grocery store. She hasn't tried the smoothies yet. She doesn't eat breakfast too often she works late and has no idea where to start. She needs to focus on it and doesn't know where to start.     She did try the multivitamin and probiotic and they are going well. She has noticed digestive issues since taking the probiotic at first she had lose stools but now she has more consistent stools but depends on day and what she has eaten. Her digestive symptoms have incrased with the dairy and wheat/gluten in diet. She is still is mostly dairy free she has taken out cheese, yogurt and milk. She noticed that the dairy can give her heartburn so trying to stay away. Ex She thought it was tomatoes in the soup but ended up being the milk.     She wants to track her foods more and how it makes her feel to see if that will help keep her on track. She felt great when she reflected on this in her journal in the past. She feels she has enough nutrition resources at this time but just needs to focus on the goals we set at our initial consult for another few weeks so she can get back on track.     She went off the birth control in Oct as wanting to get pregnant. She didn't get the pregnancy books recommended but wants to look into these further.       NUTRITION ASSESSMENT:   Nutritional Goals 2/23/2022   Nutritional Goal Create healthier eating patterns;Create a plan to lose weight        Neurological 2/23/2022   Migraine Headaches Past;Current   Tension Headache Past;Current       No flowsheet data found.   No flowsheet data found.   No flowsheet data found.    Food Sensitivities 2/23/2022   Lactose intolerance Past;Current      Endocrine 2/23/2022   Type 2 diabetes Current   Overweight/obesity Current      Skin 2/23/2022   Dry Skin Current      No flowsheet data found.   No flowsheet data found.   Psychological 2/23/2022   ADD/ADHD/Asperger's Current   Depression/Anxiety Current      No flowsheet data  found.   Womens Health Assessment 2/23/2022   Hysterectomy No   I am pregnant.  No   I am breastfeeding. No   I want to become pregnant within the next year . Yes   What do you use for contraception?  birth control pill   Any problems with current birth control method?   No   Date of last menstrual period 98158   Are your periods irregular? No   Days from the start of one period to the next. 24   Days of Menstral Flow 6   Associated with menstral periods. Bloating;Water retention;Diarrhea/constipation   Are you officially in menopause? (no period for one year or longer)  No        Past Medical History:  Past Medical History:   Diagnosis Date     Chronic kidney disease      Diabetes (H)     prediabetic       Previous Surgeries:   No past surgical history on file.     Family History:  Family History   Problem Relation Age of Onset     Hypertension Mother      Glaucoma Mother      Diabetes Maternal Grandfather      Diabetes Paternal Grandmother      Hypertension Paternal Grandmother      Diabetes Brother      Glaucoma Brother      Heart Disease Father      Other Cancer Maternal Grandmother         ovarian     Cancer Maternal Grandmother      Glaucoma Maternal Grandmother      Macular Degeneration Maternal Grandmother         Lifestyle History:  Lifestyle 2/23/2022   Do you feel your life is stressful right now?  Yes   What is the cause(s) of stress in your life?  Work;Multi-tasking and multiple deadlines to meet;Over thinking and too much analysis;Taking care of parents, children or other family member   Are you currently implementing any strategies to help manage stress? No        Exercise History:  Exercise 2/23/2022   Does your occupation require extended periods of sitting?  No   Does your occupation require extended periods of repetitive movements (ex: walking or lifting)?  Yes   Do you currently participate in any forms of exercise? No   Rate your level of motivation for including exercise in your routine  (0=none, 10=high): 7   Do you have any medical conditions, pain, injuries, surgeries etc. restricting you from exercise? No        Sleep History:  Sleep 2/23/2022   How many hours (on average) do you sleep per night? 7-9   What time do you turn off the lights? 12 PM   How long does it take for you to fall asleep? 30-45 mins   What time do you stop using electronic devices? 12 PM   What time do you wake up? 9 AM   When do you eat your first meal?  10 AM   Do you feel well-rested during the day?  No   Do you take naps?  No   Do you have a comfortable bedroom environment (cool, quiet, dark, etc)? Yes   Do you have a sleep routine/ ritual that you do before bed?  No   How many hours do you spend per day looking at a screen (TV, computer, tablet and phone)? 3 to 4   Select all factors that apply to your current sleep habits: Have difficulty waking up in the morning;Eat large meals within 3 hours of going to bed;Feel tired/sluggish/fatigued during the day;Have tried supplements (ie: melatonin) for sleep        Nutrition History:  Nutrition 2/23/2022   Have you ever had a nutrition consultation? No   Do you currently follow a special diet or nutritional program? No   What do you feel are the biggest barriers getting in the way of achieving you nutritional goals? Lack of time;Lack of prep/cooking skills;Work (such as lack of time to eat, unhealthy choices, etc.);Lack of nutrition knowledge;Other   Do you have any food allergies, sensitivities or intolerances?  No       Digestion 2/23/2022   Do you experience stomach pains/cramping? Rarely   Do you experience bloating?  Weekly   Do you experience gas?  Rarely   Do you experience heartburn/acid reflux/indigestion? Weekly   How often do you have a bowel movement? 2 times per day   What is a typical bowel movement like for you? Select all that apply: Formed and soft      Food Access:  2/23/2022   Who does the grocery shopping? Self;Spouse/Partner   How often is grocery shopping  done? Weekly   Where do you usually receive your groceries from? Select all that apply: [employer]   Do you read food labels? Yes   What do you look at?  Calories;Lactose Free;Carbs;Sugar;Protein   Who does the cooking? Select all that apply: Self;Spouse/Partner   How many meals do you eat out per week?  1 to 3   What restaurants do you typically choose? Fast Food (Taco Bell, [employer], Subway, etc.)      Daily Patterns: 2/23/2022   How many days per week do you have breakfast? 4   How many days per week do you have lunch? 7   How many days per week do you have dinner? 7   How many days per week do you have snacks? 5      Protein Intake: 2/23/2022   How many times per day do you typically consume a protein source(s)? 3   What types of protein do you currently eat?  Ground Beef;Hamburgers;[place];Chicken Breast;Deli Chicken;Chicken Sausage;Other Chicken;Turkey Breast;Ground Turkey;Deli Turkey;Turkey Burnette;Eggs       Fat Intake:  2/23/2022   How many times per day do you typically consume healthy fat(s)? 2   What types of health fats do you currently eat? Select all that apply:  Almonds;Pecans;Cashews;Peanut butter;Coconut oil;Avocado oil;Olive oil;Avocado       Fruit Intake:  2/23/2022   How many times per day do you typically consume fruits? 1   What types of fruit do currently eat? Cherries;Dates;Mandarin oranges;Pears;Pineapple;Watermelon       Vegetable Intake:  2/23/2022   How many times per day do you typically consume vegetables? 3   What types of vegetables do you currently eat? Asparagus;Broccoli;Dow sprouts;Carrots;Cauliflower;Celery;Corn;Cucumber;Green onions/scallions;Onions;Potato (baked, boiled, mashed, French fries);Tomato;Water chestnuts      Grain Intake:  2/23/2022   How many times per day do you typically consume grains? 3   What types of grains do currently eat? Select all that apply:  Bagel (non-gluten free);Breads (non-gluten free)       Dairy Intake:  2/23/2022   How  many times per day do you typically consume dairy? 3   What types of dairy do currently eat? Select all that apply:  Milk       Non-Dairy Alternative Intake:  2/23/2022   How many times per day do you typically consume non-dairy alternatives? 0   What types of non-dairy alternatives do currently eat? Select all that apply:  Coconut milk;Oat milk       Sweets Intake:  2/23/2022   How many times per day do you typically consume sweets? 2      Beverage Intake:  2/23/2022   How many 8 oz cups of water do you typically consume per day?  4 to 5   How many 8 oz cups of caffeine do you typically consume per day?  1 to 2   How many drinks of alcohol do you typically consume per week (1 drink = 5 oz wine, 12 oz beer, 1.5 oz spirits)?   0       Lifestyle Recall:  2/23/2022   What time did you wake up? 7 AM   What time did you go to sleep? 12 PM   What time did you have breakfast? 8-9 AM   Where did you have breakfast?  Restaurant   What time did you have a morning snack? No snack   What time did you have lunch? 11AM-12 PM   Where did you have lunch?  Home   What time did you have an afternoon snack? 3-4 PM   Where did you have your afternoon snack? Work   What time did you have dinner? 9-10 PM   Where did you have dinner?  Work   What time did you have an evening snack? No Snack   Where did you exercise? Other          Additional concerns: takes medications and since starting lost about 10-13lbs. Recently moved in with grandma. She prefers chicken over red meat.   Breakfast: Shake - powder that she mixes with milk or water and protein bar     MEDICATIONS:  Current Outpatient Medications   Medication Sig Dispense Refill     blood glucose (NO BRAND SPECIFIED) lancets standard Use to test blood sugar 2 times daily or as directed. 100 each 1     blood glucose monitoring (NO BRAND SPECIFIED) meter device kit Use to test blood sugar 2 times daily or as directed. 1 kit 0     Continuous Blood Gluc  (FREESTYLE ANNE MARIE 2 READER)  JOSESITO 1 each daily Use as directed with [place] continuous glucose system. (Patient not taking: Reported on 8/26/2022) 1 each 0     dexamethasone (DECADRON) 1 MG tablet Take 1 tablet (1 mg) by mouth once for 1 dose 1 tablet 0     hydrOXYzine (ATARAX) 25 MG tablet Take 25 mg by mouth every 4 hours as needed        liraglutide (VICTOZA PEN) 18 MG/3ML solution ADMINISTER 1.8 MG UNDER THE SKIN DAILY 27 mL 3     metFORMIN (GLUCOPHAGE) 500 MG tablet Take 1 tablet (500 mg) by mouth 2 times daily (with meals) 180 tablet 3     Multiple Vitamins-Minerals (MULTIVITAMIN ADULTS PO)        naproxen (NAPROSYN DR) 500 MG EC tablet Take 500 mg by mouth 2 times daily as needed (pain) 60 tablet 0     norethindrone (MICRONOR) 0.35 MG tablet Take 1 tablet (0.35 mg) by mouth daily 84 tablet 0     ONETOUCH ULTRA test strip USE TO TEST BLOOD SUGARS TWICE DAILY OR AS DIRECTED 100 strip 11     Probiotic Product (PROBIOTIC DAILY PO)          No flowsheet data found.      ALLERGIES:   No Known Allergies     .na  LABS:  Last Basic Metabolic Panel:  Lab Results   Component Value Date     03/03/2022     12/02/2021     11/16/2020     10/03/2018      Lab Results   Component Value Date    POTASSIUM 3.9 03/03/2022    POTASSIUM 4.2 12/02/2021    POTASSIUM 4.1 11/16/2020    POTASSIUM 4.1 10/03/2018     Lab Results   Component Value Date    CHLORIDE 111 03/03/2022    CHLORIDE 107 12/02/2021    CHLORIDE 110 11/16/2020    CHLORIDE 110 10/03/2018     Lab Results   Component Value Date    SEGUN 8.5 03/03/2022    SEGUN 8.7 12/02/2021    SEGUN 8.3 11/16/2020    SEGUN 8.8 10/03/2018     Lab Results   Component Value Date    CO2 25 03/03/2022    CO2 25 12/02/2021    CO2 24 11/16/2020    CO2 25 10/03/2018     Lab Results   Component Value Date    BUN 10 03/03/2022    BUN 9 12/02/2021    BUN 11 11/16/2020    BUN 13 10/03/2018     Lab Results   Component Value Date    CR 0.54 03/03/2022    CR 0.61 12/02/2021    CR 0.58 11/16/2020    CR 0.55 10/03/2018      Lab Results   Component Value Date    GLC 97 03/03/2022     12/02/2021     11/16/2020     08/13/2020     07/17/2019       Last Glucose Profile:   Hemoglobin A1C   Date Value Ref Range Status   06/13/2022 5.6 0.0 - 5.6 % Final     Comment:     Normal <5.7%   Prediabetes 5.7-6.4%    Diabetes 6.5% or higher     Note: Adopted from ADA consensus guidelines.   03/03/2022 5.4 0.0 - 5.6 % Final     Comment:     Normal <5.7%   Prediabetes 5.7-6.4%    Diabetes 6.5% or higher     Note: Adopted from ADA consensus guidelines.   12/02/2021 5.6 0.0 - 5.6 % Final     Comment:     Normal <5.7%   Prediabetes 5.7-6.4%    Diabetes 6.5% or higher     Note: Adopted from ADA consensus guidelines.   07/05/2021 5.3 0 - 5.6 % Final     Comment:     Normal <5.7% Prediabetes 5.7-6.4%  Diabetes 6.5% or higher - adopted from ADA   consensus guidelines.     03/31/2021 5.7 (H) 0 - 5.6 % Final     Comment:     Normal <5.7% Prediabetes 5.7-6.4%  Diabetes 6.5% or higher - adopted from ADA   consensus guidelines.     11/16/2020 5.6 0 - 5.6 % Final     Comment:     Normal <5.7% Prediabetes 5.7-6.4%  Diabetes 6.5% or higher - adopted from ADA   consensus guidelines.         Last Lipid Profile:   Cholesterol   Date Value Ref Range Status   03/03/2022 138 <200 mg/dL Final   08/13/2020 196 <200 mg/dL Final   07/17/2019 158 <200 mg/dL Final   11/06/2017 166 <170 mg/dL Final     HDL Cholesterol   Date Value Ref Range Status   08/13/2020 40 (L) >49 mg/dL Final   07/17/2019 36 (L) >49 mg/dL Final   11/06/2017 45 (L) >45 mg/dL Final     Comment:     Low:             <40 mg/dl  Borderline low:   40-45 mg/dl       Direct Measure HDL   Date Value Ref Range Status   03/03/2022 39 (L) >=50 mg/dL Final     LDL Cholesterol Calculated   Date Value Ref Range Status   03/03/2022 75 <=100 mg/dL Final   08/13/2020 106 (H) <100 mg/dL Final     Comment:     Above desirable:  100-129 mg/dl  Borderline High:  130-159 mg/dL  High:              160-189 mg/dL  Very high:       >189 mg/dl     07/17/2019 82 <100 mg/dL Final     Comment:     Desirable:       <100 mg/dl   11/06/2017 88 <110 mg/dL Final     Triglycerides   Date Value Ref Range Status   03/03/2022 119 <150 mg/dL Final   08/13/2020 250 (H) <150 mg/dL Final     Comment:     Borderline high:  150-199 mg/dl  High:             200-499 mg/dl  Very high:       >499 mg/dl  Fasting specimen     07/17/2019 199 (H) <150 mg/dL Final     Comment:     Borderline high:  150-199 mg/dl  High:             200-499 mg/dl  Very high:       >499 mg/dl  Fasting specimen     11/06/2017 164 (H) <90 mg/dL Final     Comment:     Borderline high:   mg/dl  High:            >129 mg/dl  Fasting specimen       No results found for: CHOLHDLRATIO    Most recent CBC:  Recent Labs   Lab Test 05/17/21  1729 10/03/18  1530   WBC 8.3 10.8   HGB 13.3 13.3   HCT 39.1 39.9    359     Most recent hepatic panel:  Recent Labs   Lab Test 12/02/21  1052 10/03/18  1530   ALT 49 80*   AST 25 39     Most recent creatinine:  Recent Labs   Lab Test 03/03/22  1218 12/02/21  1052   CR 0.54 0.61       No components found for: GFRESETIMATEDLASTLAB(gfrestblack:1@  Lab Results   Component Value Date    ALBUMIN 3.4 12/02/2021    ALBUMIN 3.3 10/03/2018       Last Thyroid Profile:   TSH   Date Value Ref Range Status   05/17/2021 1.24 0.40 - 4.00 mU/L Final   08/13/2020 1.09 0.40 - 4.00 mU/L Final   07/17/2019 1.28 0.40 - 4.00 mU/L Final       Last Mineral Profile:   No results found for: GIANNA, IRON, FEB    Autoimmune & Inflammatory   No results found for: CRP      Last Vitamin Profile:   No results found for: RLK057, CYAT985, OKHY62KKPKW, VITD3, D2VIT, D3VIT, DTOT, [id number], [id number], [id number], [id number], [id number], [id number]    ANTHROPOMETRICS:  Vitals:   BP Readings from Last 1 Encounters:   08/26/22 130/89     Pulse Readings from Last 1 Encounters:   08/26/22 67     Estimated body mass index is 46.69 kg/m  as calculated from the  "following:    Height as of 8/26/22: 1.734 m (5' 8.25\").    Weight as of 8/26/22: 140.3 kg (309 lb 4.8 oz).    Wt Readings from Last 5 Encounters:   08/26/22 140.3 kg (309 lb 4.8 oz)   03/25/22 141.9 kg (312 lb 12.8 oz)   03/03/22 142.3 kg (313 lb 12.8 oz)   11/02/21 140.6 kg (310 lb)   10/28/21 142.3 kg (313 lb 12.8 oz)       .NUTRITION DIAGNOSIS:   1. Altered nutrition-related laboratory values related to endocrine dysfunction as evidenced by elevated BMI, past elevated HbA1c     2. Altered nutrition-related laboratory values related to cardiac as evidenced by elevated past elevated TG, past elevated LDL, and current low HDL.     3. Overweight/Obesity related to food and nutrition related knowledge deficit (excessive CHO intake, Inadequate fiber intake, inappropriate intake of healthy omega 3 fatty acids) as evidenced by nutrition intake record (limited variety of foods), past elevated labs.    4. Overweight/obesity related to inability to sustain diet/lifestyle change, as evidenced by many previous attempts at weight loss with weight regain over the past five years. Has seen improvement in lab values for recently       NUTRITION INTERVENTION:     Long Term Goals:   Goal: Lipid profile; Maintain TG <150 mg/dL, Increase HDL > 49mg/dL, Maintain LDL <100mg/dL within 6 months   Goal: Maintain Hemoglobin A1c <6 % within 2-6 months   Goal: Lose 20-24lbs in 6 months, recommend average 1-2lbs per week of weight loss.         Short Term Goals:  Goal 1: No improvement shown and wants to continue: Focus on smoothie for breakfast, ---add in unsweetened flax, hemp or coconut milk, 1 tbsp of healthy fat such as flax seed ground or satnam seed ground. serving of fruit 1 cup of berries. Veggie (optional), 1 scoop of plant based protein powder or collagen powder, 1 serving of fruit and veggie such as kale, spinach, or veggie powder even spirulina - see recipes below or details)      Also, check out some of the other breakfast ideas " provided such as oatmeal (naturally gluten free) with dairy free alternative milk, flax seed, nuts such as walnuts, fruit such as berries and protein like eggs or chicken/turkey sausage.     Avocado on slice of gluten free bread recommend base culture brand      https://[employer]/?[email or web address]     Birch campbell brand for pancakes - paleo  https://[employer]/collections/pancake-waffle-mix/products/paleo     Try siete soft taco shells with eggs and veggies in am for breakfast   https://[employer]/tortillas/products/qhqqmx-vzakg-woqdgmuwk-6-pack        Goal 2: Improvement Shown wants to continue to work on: Make sure to eat consistent meals daily with focus on balanced lunch and at least 1-2 snacks daily around 10am and 3pm.  Eating every three to four hours will help keep your insulin and blood sugar normal to help with weight loss  - see meal plan and recipe ideas in the cardi metabolic guides provided.     Some examples:   Beef Jerky with veggie or fruit   https://[employer]oc[employer].net/recipe/3-ingredient-satnam-pudding/     Simple [place] Chips with hummus     Siete Chips with Guac and Salsa      Hummus with veggie      Fruit with nut butter - banana with sunflower seed butter or apple with almond butter      Goal 3:  Improvement Shown wants to continue work: Check out [employer].co Alfred journal it can be beneficial in setting short term and long term goals for success beyond just looking at calories in and out.  https://daily[employer].co/collections/dailygreatFreever/products/daily[employer]-wellness-journal-90-day?nuiwaan=58115290980274     Goal 4: Improvement Shown: Start cutting back on gluten and dairy. Trial Gluten and Dairy free for at least 30 days or until our next appointment.   - consider just having 1 serving of grains per day and swap for lower carb/sugar options such as non-starchy  veggies. Read labels for low sugar and carbs.          Goal 5: Improvement Shown: Recommend the following supplements to start     Nordic Naturals Plus vitamin D - 1-2 capsules daily with food ----https://www.[employer]/consumers/ultimate-omega-d3    Great prenatal - packets by [employer] for health or metagencis plus one        Smoothie Recipes to Kickoff Your Success!!     Kiwi Spirulina Smoothie      Makes 1 servings     1 cup unsweetened macadamia nut milk or filtered water   1 scoop ortez vanilla plant based protein powder or 1-2 scoops collagen powder from vital proteins (plain)   1-2 Kiwi s peeled   1 handful cilantro   1 lime juiced    - 1 avocado   2 tsp spirulina   1 inch tonny fresh pilled      Method:     In  (recommend vitamix or blend tech) add dairy free alternative milk or filtered water. Add in the rest of ingredients blend on high for 2 mins. If desire thinner consistency add in the water or dairy alternative. Enjoy :)     Green Detox Smoothie      Makes 1 servings     1 cup unsweetened macadamia nut milk or filtered water (I like filtered water more for this recipe)  1-2 scoops collagen powder from vital proteins (plain)   1 small granny escobar apple (prefer organic - lower pesticides and endocrine disruptors)   1 handful cilantro   1 lime juiced    - 1 avocado (optional)   1 cucumber      Method:     In  (recommend vitamix or blend tech) add dairy free alternative milk or filtered water. Add in the rest of ingredients blend on high for 2 mins. If desire thinner consistency add in the water or dairy alternative. Enjoy :)        Spirulina Banana Smoothie      Makes 1 serving     1-2 cup unsweetened macadamia nut milk or filtered water (I like filtered water more for this recipe)  1 scoop ortez vanilla plant based protein powder     banana   1 handful spinach    - 1 avocado  1-2 tsp spirulina    1 tbsp ground flax seed      Method:     In  (recommend vitamix or blend tech)  add dairy free alternative milk or filtered water. Add in the rest of ingredients blend on high for 2 mins. If desire thinner consistency add in the water or dairy alternative. Enjoy :)     Berry Blast Smoothie      Makes 1 serving     1-2 cup unsweetened macadamia nut milk or filtered water (I like filtered water more for this recipe)  1 scoop ortez vanilla plant based protein powder or collagen protein powder by vital proteins (plain)   1 cup mixed berries    1 handful spinach    - 1 avocado  1-2 tsp raw organic maqui berry powder by [employer] superfoods (optional but very immune boosting and jammed with phytonutrients)   1 tbsp ground flax seed      Method:     In  (recommend vitamix or blend tech) add dairy free alternative milk or filtered water. Add in the rest of ingredients blend on high for 2 mins. If desire thinner consistency add in the water or dairy alternative. Enjoy :)     Superfood + Immune Boosting Powders:  Maqui Berry Powder   Turmeric Powder   Spirulina Powder   Glutamine Powder   Matcha Powder   Ruth fresh or powdered   Kale, dandelion greens or spinach         Omega 3 and Protein Desired Toppings:   Add some healthy protein and omega 3 packed seeds for an extra special nutrient packed topping and a little extra crunch!   1 tsp -1 tbps ground flax seed   1 tsp -1 tbsp hemp seeds   1 tsp-1 tbsp satnam seeds         Anti-inflammatory Mediterranean Approach: Eat whole, unprocessed real foods in their unprocessed forms such as fruits, vegetables, whole grains (prefer gluten free), nuts, legumes, extra virgin olive oil, spices, modest amounts of poultry, and fish.      Avoid inflammatory foods: Eliminate gluten found in wheat, barley, rye, oats, kamut, and spelt for at least 3 weeks to identify any hidden reaction. Gluten sensitivity or allergy can cause many different types of symptoms form migraines to fatigue to weight gain.  If symptoms go away this is a clue you may be reactive to gluten.   Dairy can also be inflammatory consider eliminating for 3 weeks as well. The proteins like casein and whey in dairy can irritate and inflame your gut. Also the sugar lactase in dairy can cause digestive issues in addition to blood sugar spikes.     Food plan - 1400 calories per day     Protein 9-10 servings per day  - include at each meal to stabilize blood sugars   (Choose 3oz or 21g per meal and aim for 1oz of 7g for snacks)   - Strive for 1-2 servings of fish per week especially of higher omega-3 fatty acid containing fish such as salmon.     Legumes 1 serving per day     Dairy alternatives 1 serving per day     Nuts and seeds 2-3 servings per day - great to incorporate as snacks     Fats and oils 4 servings per day     Non starchy vegetables 7-8 servings per day     Starchy vegetable limit 1 serving per day as they tend to impact blood sugar (they are moderate-GI).     Fruits 2 servings per day - best to couple with a little bit of protein or fat to offset a rise in blood sugars (they are low-moderate-GI foods).     Whole grains <1 serving per day - try gluten free whole grains instead       Incorporate protein powder daily:    Plant based hemp (recommended brands: [place], Nutiva, Just Hemp Protein, [employer])      Plant based pea (recommended brands: Naked Pea, Now Sports). If you want to try a combo of pea and hemp the brand Dai in vanilla or chocolate is a great option.     Try Bone broth protein powder or collagen peptides in liquid bone broth, vegetable broth or 12 oz of water as snack. The bone broth powder and collagen can be used for soups as well. This can help provide essential amino acids and minerals that heal your gut as well as balance blood sugars. A great option if you have a hard time tolerating solids.     Choose Low Glycemic (GI) foods: Regulate your sugar levels by eating foods that do not spike blood sugars.  Eat low -GI foods so only small fluctuations in blood glucose and  insulin levels are produced.      Examples of low-GI foods: nuts, seeds, GF oats, most vegetables especially non-starchy and fruits.     Medium or high-GI foods should be eaten with a protein or fat, both of which blunt the glycemic effect of these foods. This reduces the overall glycemic impact of a meal.   Ex: Most grains and starchy veggies are medium/high GI.     Avoid foods containing refined sugars, artificial sweeteners, and refined grains they are considered high-GI because they lead to sharp increases in blood sugars levels, which increase insulin sensitivity causing increased TG, and low good cholesterol (HDL).   Ex: cakes, cookies, pies, bread, sodas, fruit drinks, presweetened tea, coffee drinks, energy or sport drinks, flavored milk and other processed foods.     Choose foods high in fiber: Aim for at least 5 grams of fiber per serving of food or a total of 25-35 grams fiber per day. Remember, when looking at the label, you can take the fiber away from the total carbs. Ex:15g of total carbs - 4g of fiber = 11g net carbs     Insoluble fiber acts like a bulky  inner broom,  sweeping out debris from the intestine and creating more motility and movement.      Soluble fiber attracts water and swells, creating a gel that slows digestion.  Also, slows the release of glucose from foods into the blood which stops spikes in blood sugar levels.  Soluble fiber traps toxins in the gut, helping to carry them to excretion and provides healthy bacteria in the digestive tract.     Choose High Quality Fats: Adding anti-inflammatory fats into your diet such as fish (salmon, herring, mackerel, and sardines), omega 3 eggs, satnam seeds, ground flax seeds/milk, hemp seeds/milk, walnuts and some other certain leafy greens will increase omega-3 fats to omeaga-6 fats ratio.     Therapeutic fats both monounsaturated and polyunsaturated to include daily: ground flaxseeds, unsalted mixed nuts, avocados, olives, extra-virgin olive  oil.     Emphasize high-quality oils and fats in the diet daily such as avocado oil, coconut oil, flaxseed, olive, sesame. Ex: 1 tsp to 1 tbsp of MCT oil from coconuts can be added into coffee, smoothies, and salad dressings per day.     Avoid trans fats found in processed foods     Drink more water. Hydration is critical, so drink at least six to eight glasses of water a day. Drink more water between meals and less at meals.     Try adding herbal teas (sugar free) or lemon/lime/cucumber/fruit to water for flavor. Avoid artificial sweetener packets to flavor your water.     Cut back on coffee switch to green tea. Avoid adding sugar and milk to coffee instead use dairy alternatives such as almond, flax, coconut milk.     Focus on high quality micro-nutrients.     One Multivitamin by Pure encapsulations - 1 capsule per day with food     N[employer]ic Naturals Plus vitamin D - 1-2 capsules daily with food    Rebuild the friendly bacteria in your microbime. Start by taking a probiotic supplement, they will help rebuild the healthy bacteria so essential to good gut health. Recolonization your digestive tract with healthy, beneficial good bacteria (probiotics). You can do that by taking very high-potency probiotics (look for at least 25 billion live CFU s from diversified strains of Lactobacillus, Bifidobacterium, and Saccharomyces boulardii), taken twice a day for one to two months.  Start slowly and observe how the probiotics affect your gut.  In some cases, certain individuals may need to delay probiotics until their gut is more intact.      Eat fermented foods. Include plenty of probiotic-rich foods like kimchi, kombucha, miso, or sauerkraut. Sometimes, you can also eat yogurt if you are not allergic to dairy. Try unsweetened sheep or goat yogurt. These are all foods that help your gut juve get and stay healthy.    In addition you can help establish healthy gut microflora by consuming foods that contain live active cultures  "( probiotics ) such as kimchi,     Increase physical activity. Moving our body helps move our bowels and speeds up your metabolism.     Exercise 15-60 minutes daily--whether that looks like burst training, yoga, or vigorous walking.    Manage your stress. Stress can negatively impact the way you digest foods and absorb nutrients leading to more digestive issues (constipation, diarrhea, indigestion, nausea etc), imbalanced blood sugars and weight imbalances. Try to focus on the following relaxation techniques:     Regular exercise such as walking     Yoga    Meditation     Breathing techniques     Time management     Track what you eat. Writing down or tracking through an jurgen what you eat as well as how you feel acn help you identify patterns in your symptoms. This can help you become more aware and creat a diet that is right for you.     Pick a food tracking jurgen:     Through the [employer] jurgen, you can track symptoms, bowl movements, medications, stress, exercise, sleep and foods as well as beverages to become more mindful. [email or web address]    Through the [employer] jurgen, you can focus more on calories and macronutrient's of foods to balance blood sugars or monitor weight. You can track blood sugars in the notes section along with symptoms, bowl movements, medications, stress, exercise, water intake and sleep.   Note: You don't have to journal forever and it is more important that you have consistent meals as well as snacks while focusing on adding in healthy foods.    NUTRITION RESOURCES:  1. Nicki Arauz's \"It Starts with the Egg: How the Science of Egg Quality Can Help You Get Pregnant Naturally, Prevent Miscarriage, and Improve Your Odds in IVF\"   2. IFM Elimination Packet     Functional Nutrition Fundamentals     Comprehensive Guide    Meal plan with recipes         PATIENT'S BEHAVIOR CHANGE GOALS:   See nutrition intervention for patient stated behavior change goals. AVS was printed and given to " patient at today's appointment.    MONITOR / EVALUATE:  Registered Dietitian will monitor/evaluate the following:     Beliefs and attitudes related to food    Food and nutrition knowledge / skills    Food / Beverage / Nutrient intake     Pertinent Labs    Progress toward meeting stated nutrition-related goals    Readiness to change nutrition-related behaviors    Weight change    Digestion     COORDINATION OF CARE:  Follow up with primary care provider as needed      FOLLOW-UP:  Follow-up appointment recommend in 4-6 weeks January 18th at 11am video visit     Time spent in minutes: 30 minutes 2 units   Encounter: Individual    Altagracia Yap RD, CLT, LD  Integrative Registered Dietitian

## 2022-12-08 ENCOUNTER — LAB (OUTPATIENT)
Dept: LAB | Facility: CLINIC | Age: 22
End: 2022-12-08
Payer: COMMERCIAL

## 2022-12-08 DIAGNOSIS — E11.9 TYPE 2 DIABETES MELLITUS WITHOUT COMPLICATION, WITHOUT LONG-TERM CURRENT USE OF INSULIN (H): ICD-10-CM

## 2022-12-08 DIAGNOSIS — E11.9 TYPE 2 DIABETES MELLITUS WITHOUT COMPLICATION, WITH LONG-TERM CURRENT USE OF INSULIN (H): Primary | ICD-10-CM

## 2022-12-08 DIAGNOSIS — Z79.4 TYPE 2 DIABETES MELLITUS WITHOUT COMPLICATION, WITH LONG-TERM CURRENT USE OF INSULIN (H): Primary | ICD-10-CM

## 2022-12-08 LAB
CORTIS SERPL-MCNC: 0.5 UG/DL
CREAT UR-MCNC: 112 MG/DL
HBA1C MFR BLD: 5.6 % (ref 0–5.6)
MICROALBUMIN UR-MCNC: 102 MG/L
MICROALBUMIN/CREAT UR: 91.07 MG/G CR (ref 0–25)

## 2022-12-08 PROCEDURE — 83036 HEMOGLOBIN GLYCOSYLATED A1C: CPT

## 2022-12-08 PROCEDURE — 82043 UR ALBUMIN QUANTITATIVE: CPT

## 2022-12-08 PROCEDURE — 82533 TOTAL CORTISOL: CPT

## 2022-12-08 PROCEDURE — 36415 COLL VENOUS BLD VENIPUNCTURE: CPT

## 2022-12-12 ENCOUNTER — TRANSFERRED RECORDS (OUTPATIENT)
Dept: HEALTH INFORMATION MANAGEMENT | Facility: CLINIC | Age: 22
End: 2022-12-12

## 2022-12-12 NOTE — PATIENT INSTRUCTIONS
NUTRITION INTERVENTION:     Long Term Goals:   Goal: Lipid profile; Maintain TG <150 mg/dL, Increase HDL > 49mg/dL, Maintain LDL <100mg/dL within 6 months   Goal: Maintain Hemoglobin A1c <6 % within 2-6 months   Goal: Lose 20-24lbs in 6 months, recommend average 1-2lbs per week of weight loss.         Short Term Goals:  Goal 1: No improvement shown and wants to continue: Focus on smoothie for breakfast, ---add in unsweetened flax, hemp or coconut milk, 1 tbsp of healthy fat such as flax seed ground or satnam seed ground. serving of fruit 1 cup of berries. Veggie (optional), 1 scoop of plant based protein powder or collagen powder, 1 serving of fruit and veggie such as kale, spinach, or veggie powder even spirulina - see recipes below or details)      Also, check out some of the other breakfast ideas provided such as oatmeal (naturally gluten free) with dairy free alternative milk, flax seed, nuts such as walnuts, fruit such as berries and protein like eggs or chicken/turkey sausage.     Avocado on slice of gluten free bread recommend base [employer] brand      [email or web address]     Birch campbell brand for pancakes - paleo  https://[employer]/collections/pancake-waffle-mix/products/paleo     Try siete soft taco shells with eggs and veggies in am for breakfast   https://[employer]collections/tortillas/products/pekeyc-iufzx-trpjigmir-6-pack        Goal 2: Improvement Shown wants to continue to work on: Make sure to eat consistent meals daily with focus on balanced lunch and at least 1-2 snacks daily around 10am and 3pm.  Eating every three to four hours will help keep your insulin and blood sugar normal to help with weight loss  - see meal plan and recipe ideas in the cardi metabolic guides provided.     Some examples:   Beef Jerky with veggie or fruit    https://Bjondgoodfoodie.net/recipe/3-ingredient-satnam-pudding/     [place] Chips with hummus     Siete Chips with Guac and Salsa      Hummus with veggie      Fruit with nut butter - banana with sunflower seed butter or apple with almond butter      Goal 3:  Improvement Shown wants to continue work: Check out [employer] journal it can be beneficial in setting short term and long term goals for success beyond just looking at calories in and out.  https://[employer]/collections/dailygreatScrap Connection/products/dailygreaTeamLease Services-wellness-journal-90-day?mzvghoh=[email or web address]     Goal 4: Improvement Shown: Start cutting back on gluten and dairy. Trial Gluten and Dairy free for at least 30 days or until our next appointment.   - consider just having 1 serving of grains per day and swap for lower carb/sugar options such as non-starchy veggies. Read labels for low sugar and carbs.          Goal 5: Improvement Shown: Recommend the following supplements to start   Nordic Naturals Plus vitamin D - 1-2 capsules daily with food ----https://www.[employer]/consumers/ultimate-omega-d3  Great prenatal - packets by designs for health or [employer] plus one        Smoothie Recipes to Kickoff Your Success!!     Kiwi Spirulina Smoothie      Makes 1 servings     1 cup unsweetened macadamia nut milk or filtered water   1 scoop ortez vanilla plant based protein powder or 1-2 scoops collagen powder from vital proteins (plain)   1-2 Kiwi s peeled   1 handful cilantro   1 lime juiced    - 1 avocado   2 tsp spirulina   1 inch tonny fresh pilled      Method:     In  (recommend vitamix or blend tech) add dairy free alternative milk or filtered water. Add in the rest of ingredients blend on high for 2 mins. If desire thinner consistency add in the water or dairy alternative. Enjoy :)     Green Detox Smoothie      Makes 1 servings     1 cup unsweetened macadamia nut milk or filtered water (I like filtered water  more for this recipe)  1-2 scoops collagen powder from vital proteins (plain)   1 small granny escobar apple (prefer organic - lower pesticides and endocrine disruptors)   1 handful cilantro   1 lime juiced    - 1 avocado (optional)   1 cucumber      Method:     In  (recommend vitamix or blend tech) add dairy free alternative milk or filtered water. Add in the rest of ingredients blend on high for 2 mins. If desire thinner consistency add in the water or dairy alternative. Enjoy :)        Spirulina Banana Smoothie      Makes 1 serving     1-2 cup unsweetened macadamia nut milk or filtered water (I like filtered water more for this recipe)  1 scoop ortez vanilla plant based protein powder     banana   1 handful spinach    - 1 avocado  1-2 tsp spirulina    1 tbsp ground flax seed      Method:     In  (recommend vitamix or blend tech) add dairy free alternative milk or filtered water. Add in the rest of ingredients blend on high for 2 mins. If desire thinner consistency add in the water or dairy alternative. Enjoy :)     Berry Blast Smoothie      Makes 1 serving     1-2 cup unsweetened macadamia nut milk or filtered water (I like filtered water more for this recipe)  1 scoop ortez vanilla plant based protein powder or collagen protein powder by vital proteins (plain)   1 cup mixed berries    1 handful spinach    - 1 avocado  1-2 tsp raw organic maqui berry powder by Sunfood superfoods (optional but very immune boosting and jammed with phytonutrients)   1 tbsp ground flax seed      Method:     In  (recommend vitamix or blend tech) add dairy free alternative milk or filtered water. Add in the rest of ingredients blend on high for 2 mins. If desire thinner consistency add in the water or dairy alternative. Enjoy :)     Superfood + Immune Boosting Powders:  Maqui Berry Powder   Turmeric Powder   Spirulina Powder   Glutamine Powder   Matcha Powder   Ruth fresh or powdered   Kale, dandelion greens or  spinach         Omega 3 and Protein Desired Toppings:   Add some healthy protein and omega 3 packed seeds for an extra special nutrient packed topping and a little extra crunch!   1 tsp -1 tbps ground flax seed   1 tsp -1 tbsp hemp seeds   1 tsp-1 tbsp satnam seeds         Anti-inflammatory Mediterranean Approach: Eat whole, unprocessed real foods in their unprocessed forms such as fruits, vegetables, whole grains (prefer gluten free), nuts, legumes, extra virgin olive oil, spices, modest amounts of poultry, and fish.      Avoid inflammatory foods: Eliminate gluten found in wheat, barley, rye, oats, kamut, and spelt for at least 3 weeks to identify any hidden reaction. Gluten sensitivity or allergy can cause many different types of symptoms form migraines to fatigue to weight gain.  If symptoms go away this is a clue you may be reactive to gluten.  Dairy can also be inflammatory consider eliminating for 3 weeks as well. The proteins like casein and whey in dairy can irritate and inflame your gut. Also the sugar lactase in dairy can cause digestive issues in addition to blood sugar spikes.     Food plan - 1400 calories per day   Protein 9-10 servings per day  - include at each meal to stabilize blood sugars   (Choose 3oz or 21g per meal and aim for 1oz of 7g for snacks)   Strive for 1-2 servings of fish per week especially of higher omega-3 fatty acid containing fish such as salmon.   Legumes 1 serving per day   Dairy alternatives 1 serving per day   Nuts and seeds 2-3 servings per day - great to incorporate as snacks   Fats and oils 4 servings per day   Non starchy vegetables 7-8 servings per day   Starchy vegetable limit 1 serving per day as they tend to impact blood sugar (they are moderate-GI).   Fruits 2 servings per day - best to couple with a little bit of protein or fat to offset a rise in blood sugars (they are low-moderate-GI foods).   Whole grains <1 serving per day - try gluten free whole grains instead        Incorporate protein powder daily:  Plant based hemp (recommended brands: [place], Nutiva, Just Hemp Protein, [employer])    Plant based pea (recommended brands: Naked Pea, Now Sports). If you want to try a combo of pea and hemp the brand Dai in vanilla or chocolate is a great option.   Try Bone broth protein powder or collagen peptides in liquid bone broth, vegetable broth or 12 oz of water as snack. The bone broth powder and collagen can be used for soups as well. This can help provide essential amino acids and minerals that heal your gut as well as balance blood sugars. A great option if you have a hard time tolerating solids.     Choose Low Glycemic (GI) foods: Regulate your sugar levels by eating foods that do not spike blood sugars.  Eat low -GI foods so only small fluctuations in blood glucose and insulin levels are produced.      Examples of low-GI foods: nuts, seeds, GF oats, most vegetables especially non-starchy and fruits.     Medium or high-GI foods should be eaten with a protein or fat, both of which blunt the glycemic effect of these foods. This reduces the overall glycemic impact of a meal.   Ex: Most grains and starchy veggies are medium/high GI.     Avoid foods containing refined sugars, artificial sweeteners, and refined grains they are considered high-GI because they lead to sharp increases in blood sugars levels, which increase insulin sensitivity causing increased TG, and low good cholesterol (HDL).   Ex: cakes, cookies, pies, bread, sodas, fruit drinks, presweetened tea, coffee drinks, energy or sport drinks, flavored milk and other processed foods.     Choose foods high in fiber: Aim for at least 5 grams of fiber per serving of food or a total of 25-35 grams fiber per day. Remember, when looking at the label, you can take the fiber away from the total carbs. Ex:15g of total carbs - 4g of fiber = 11g net carbs     Insoluble fiber acts like a bulky  inner broom,  sweeping out  debris from the intestine and creating more motility and movement.      Soluble fiber attracts water and swells, creating a gel that slows digestion.  Also, slows the release of glucose from foods into the blood which stops spikes in blood sugar levels.  Soluble fiber traps toxins in the gut, helping to carry them to excretion and provides healthy bacteria in the digestive tract.     Choose High Quality Fats: Adding anti-inflammatory fats into your diet such as fish (salmon, herring, mackerel, and sardines), omega 3 eggs, satnam seeds, ground flax seeds/milk, hemp seeds/milk, walnuts and some other certain leafy greens will increase omega-3 fats to omeaga-6 fats ratio.     Therapeutic fats both monounsaturated and polyunsaturated to include daily: ground flaxseeds, unsalted mixed nuts, avocados, olives, extra-virgin olive oil.     Emphasize high-quality oils and fats in the diet daily such as avocado oil, coconut oil, flaxseed, olive, sesame. Ex: 1 tsp to 1 tbsp of MCT oil from coconuts can be added into coffee, smoothies, and salad dressings per day.   Avoid trans fats found in processed foods     Drink more water. Hydration is critical, so drink at least six to eight glasses of water a day. Drink more water between meals and less at meals.   Try adding herbal teas (sugar free) or lemon/lime/cucumber/fruit to water for flavor. Avoid artificial sweetener packets to flavor your water.   Cut back on coffee switch to green tea. Avoid adding sugar and milk to coffee instead use dairy alternatives such as almond, flax, coconut milk.     Focus on high quality micro-nutrients.   One Multivitamin by Pure encapsulations - 1 capsule per day with food   Nordic Naturals Plus vitamin D - 1-2 capsules daily with food    Rebuild the friendly bacteria in your microbime. Start by taking a probiotic supplement, they will help rebuild the healthy bacteria so essential to good gut health. Recolonization your digestive tract with healthy,  beneficial good bacteria (probiotics). You can do that by taking very high-potency probiotics (look for at least 25 billion live CFU s from diversified strains of Lactobacillus, Bifidobacterium, and Saccharomyces boulardii), taken twice a day for one to two months.  Start slowly and observe how the probiotics affect your gut.  In some cases, certain individuals may need to delay probiotics until their gut is more intact.      Eat fermented foods. Include plenty of probiotic-rich foods like kimchi, kombucha, miso, or sauerkraut. Sometimes, you can also eat yogurt if you are not allergic to dairy. Try unsweetened sheep or goat yogurt. These are all foods that help your gut juve get and stay healthy.  In addition you can help establish healthy gut microflora by consuming foods that contain live active cultures ( probiotics ) such as kimchi,     Increase physical activity. Moving our body helps move our bowels and speeds up your metabolism.   Exercise 15-60 minutes daily--whether that looks like burst training, yoga, or vigorous walking.    Manage your stress. Stress can negatively impact the way you digest foods and absorb nutrients leading to more digestive issues (constipation, diarrhea, indigestion, nausea etc), imbalanced blood sugars and weight imbalances. Try to focus on the following relaxation techniques:   Regular exercise such as walking   Yoga  Meditation   Breathing techniques   Time management     Track what you eat. Writing down or tracking through an jurgen what you eat as well as how you feel acn help you identify patterns in your symptoms. This can help you become more aware and creat a diet that is right for you.     Pick a food tracking jurgen:   Through the mysymptoms jurgen, you can track symptoms, bowl movements, medications, stress, exercise, sleep and foods as well as beverages to become more mindful. Https://[employer]/wp/  Through the myfitnesspal jurgen, you can focus more on calories and  "macronutrient's of foods to balance blood sugars or monitor weight. You can track blood sugars in the notes section along with symptoms, bowl movements, medications, stress, exercise, water intake and sleep.   Note: You don't have to journal forever and it is more important that you have consistent meals as well as snacks while focusing on adding in healthy foods.    NUTRITION RESOURCES:  Nicki Arauz's \"It Starts with the Egg: How the Science of Egg Quality Can Help You Get Pregnant Naturally, Prevent Miscarriage, and Improve Your Odds in IVF\"   IFM Elimination Packet   Functional Nutrition Fundamentals   Comprehensive Guide  Meal plan with recipes   "

## 2022-12-22 ENCOUNTER — TELEPHONE (OUTPATIENT)
Dept: ENDOCRINOLOGY | Facility: CLINIC | Age: 22
End: 2022-12-22

## 2022-12-22 NOTE — TELEPHONE ENCOUNTER
PA Initiation    Medication: Continuous Blood Gluc Sensor (FREESTYLE ANNE MARIE 2 SENSOR) [facility]   Insurance Company: [employer] - Phone [phone] Fax [phone]  Pharmacy Filling the Rx: [place], [place] - [address] [address]  Filling Pharmacy Phone: [phone]  Filling Pharmacy Fax: [phone]  Start Date: 12/22/2022

## 2022-12-22 NOTE — TELEPHONE ENCOUNTER
Prior Authorization Retail Medication Request    Medication/Dose: Freestyle adolfo 2 sensors  ICD code (if different than what is on RX):    Previously Tried and Failed:    Rationale:      Insurance Name:  [facility] [employer]  Insurance ID:  [id number]      Pharmacy Information (if different than what is on RX)  Name:  T[facility]   Phone:  [phone]

## 2022-12-26 NOTE — TELEPHONE ENCOUNTER
PRIOR AUTHORIZATION DENIED    Medication: Continuous Blood Gluc Sensor (FREESTYLE ANNE MARIE 2 SENSOR) [facility] [place]     Denial Date: 12/26/2022    Denial Rational:       Appeal Information: This medication was denied. If physician would like to appeal because patient has contraindication or allergy to covered medication please write letter of medical necessity and route back to PA team to initiate.  If no further action is needed please close encounter thank you.

## 2022-12-27 NOTE — TELEPHONE ENCOUNTER
We will not appeal. Thank you.     Jackie Brown on 12/27/2022 at 2:24 PM    Vy Rajan MD  You; Mariaa Monahan RN 16 minutes ago (2:07 PM)     TA  Thank you, Bea!     Jackie, we dont try, she can do glucometer.     Vy

## 2022-12-31 DIAGNOSIS — E11.9 TYPE 2 DIABETES MELLITUS WITHOUT COMPLICATION, UNSPECIFIED WHETHER LONG TERM INSULIN USE (H): ICD-10-CM

## 2023-01-04 ENCOUNTER — MYC MEDICAL ADVICE (OUTPATIENT)
Dept: OBGYN | Facility: CLINIC | Age: 23
End: 2023-01-04

## 2023-01-04 NOTE — TELEPHONE ENCOUNTER
LMP: 9/29/22.  Stopped taking birth control 10/22.  Pt was on birth control for 4 years.  She took micronor 0.35 mg.  She had irregular periods prior to being on birth control.  But became regular once on birth control pills.    Pt has taken at home pregnancy tests which have all been negative.

## 2023-01-06 ENCOUNTER — OFFICE VISIT (OUTPATIENT)
Dept: OBGYN | Facility: CLINIC | Age: 23
End: 2023-01-06
Payer: COMMERCIAL

## 2023-01-06 VITALS
HEIGHT: 68 IN | BODY MASS INDEX: 44.41 KG/M2 | DIASTOLIC BLOOD PRESSURE: 85 MMHG | HEART RATE: 76 BPM | SYSTOLIC BLOOD PRESSURE: 152 MMHG | WEIGHT: 293 LBS | OXYGEN SATURATION: 98 %

## 2023-01-06 DIAGNOSIS — E66.01 MORBID OBESITY (H): ICD-10-CM

## 2023-01-06 DIAGNOSIS — R03.0 ELEVATED BLOOD PRESSURE READING WITHOUT DIAGNOSIS OF HYPERTENSION: ICD-10-CM

## 2023-01-06 DIAGNOSIS — N91.2 AMENORRHEA: Primary | ICD-10-CM

## 2023-01-06 LAB
B-HCG SERPL-ACNC: <1 IU/L (ref 0–5)
TSH SERPL DL<=0.005 MIU/L-ACNC: 1.22 MU/L (ref 0.4–4)

## 2023-01-06 PROCEDURE — 36415 COLL VENOUS BLD VENIPUNCTURE: CPT | Performed by: ADVANCED PRACTICE MIDWIFE

## 2023-01-06 PROCEDURE — 99213 OFFICE O/P EST LOW 20 MIN: CPT | Performed by: ADVANCED PRACTICE MIDWIFE

## 2023-01-06 PROCEDURE — 84702 CHORIONIC GONADOTROPIN TEST: CPT | Performed by: ADVANCED PRACTICE MIDWIFE

## 2023-01-06 PROCEDURE — 84443 ASSAY THYROID STIM HORMONE: CPT | Performed by: ADVANCED PRACTICE MIDWIFE

## 2023-01-06 RX ORDER — MEDROXYPROGESTERONE ACETATE 10 MG
10 TABLET ORAL DAILY
Qty: 10 TABLET | Refills: 0 | Status: SHIPPED | OUTPATIENT
Start: 2023-01-06 | End: 2023-01-16

## 2023-01-06 NOTE — PROGRESS NOTES
S:  Bev is concerned about no period since stopping birth control pills in October.  She has had multiple negative home pregnancy tests.  But her  and mom are not confident that she is not pregnant.  She took birth control pills for years.  She is now open to pregnancy.  She did have bleeding in September while on Micronor. She has elevated blood pressure without a diagnosis of hypertension. She has diabetes controlled with medication.  She has been working with a dietician on weight loss.  She says that she has had a pelvic US and has been tested for PCOS and has been told that she does not have PCOS  O:  Appears well  Body mass index is 47.36 kg/m .  BP:  152/85  A:  Amenorrhea  P: Discussed wt loss and return of fertility.  She is working on it and has an appt with Nutrition in 2 weeks.   Serum pregnancy test ordered per her request.  TSH ordered   Progesterone challenge ordered to assist in shedding of the uterine lining and possibly to assist in returning of menses - She should take this medication only when we have confirmed a negative pregnancy test.  She declines any birth control at this time to assist with bleeding or to prevent pregnancy.  Discussed if she wants further assistance with fertility to make an appt with an OB/Gyn MD for further labs and discussion.    She verbalized understanding and agreement with plan.

## 2023-01-18 ENCOUNTER — VIRTUAL VISIT (OUTPATIENT)
Dept: NUTRITION | Facility: CLINIC | Age: 23
End: 2023-01-18
Payer: COMMERCIAL

## 2023-01-18 DIAGNOSIS — Z79.4 TYPE 2 DIABETES MELLITUS WITHOUT COMPLICATION, WITH LONG-TERM CURRENT USE OF INSULIN (H): Primary | ICD-10-CM

## 2023-01-18 DIAGNOSIS — E11.9 TYPE 2 DIABETES MELLITUS WITHOUT COMPLICATION, WITH LONG-TERM CURRENT USE OF INSULIN (H): Primary | ICD-10-CM

## 2023-01-18 DIAGNOSIS — E66.01 MORBID OBESITY WITH BMI OF 45.0-49.9, ADULT (H): ICD-10-CM

## 2023-01-18 PROCEDURE — 97803 MED NUTRITION INDIV SUBSEQ: CPT | Mod: GT | Performed by: DIETITIAN, REGISTERED

## 2023-01-18 NOTE — PATIENT INSTRUCTIONS
NUTRITION INTERVENTION:   Nutrition Education (Application):  Low Glycemic Meal Plan with Recipes- GF,DF, low sugar -materials by that clean life      Discussed healthy eating patters breakfast and snacks whole grains, fats, protein & fiber    Reviewed labels focusing on carb gluten, dairy free and low, sugar    Reviewed and planned out some recipes for breakfast and snacks     Long Term Goals:   Goal: Lipid profile; Maintain TG <150 mg/dL, Increase HDL > 49mg/dL, Maintain LDL <100mg/dL within 6 months   Goal: Maintain Hemoglobin A1c <6 % within 2-6 months   Goal: Lose 20-24lbs in 6 months, recommend average 1-2lbs per week of weight loss.           Short Term Goals:  Goal 1: Focus on having a healthy balance breakfast daily for the next six weeks. See meal plan with recipes reviewed during consult continue with gluten free, dairy free and low sugar.   Incorporate a whole grain at breakfast ex oatmeal naturally gluten free with more healthy fats such as handful of walnuts and 1 fruit serving 15g of carbs such as blueberries. Try a lean protein on side such as 2 eggs, 2 turkey or chicken sausage.    Try a smoothie 1-2x a week for breakfast for the next six weeks see recipe discussed. add in unsweetened flax, hemp or coconut milk, 1 tbsp of healthy fat such as flax seed ground or satnam seed ground. serving of fruit 1 cup of berries. Veggie (optional), 1 scoop of plant based protein powder or collagen powder, 1 serving of fruit and veggie such as kale, spinach, or veggie powder even spirulina     Avocado on slice of gluten free bread recommend XOXO Kitchen brand      [email or web address]     Birch campbell brand for pancakes - paleo  [email or web address]     Try siete soft taco shells with eggs and veggies in am for breakfast    [email or web address]         Goal 2:  Continue having 1 snack daily between meals that includes 1 fruit serving 15g of carbs and healthy fat and or lean protein for the next eight weeks.   - see meal plan and recipe ideas in the cardi metabolic guides provided.     2 hard boiled eggs.   Greek Yogurt with Blackberries  Fresh Yellow Pear or veggie (radishes, bell peppers, baby cucumbers, carrots or sugar snap peas) with Hummus - try the mini to go packs to help with portion control   Marinated Olives* and Kefir   Purple Plum with Mixed Nuts  Celery with Bend Butter  Dark Chocolate, 70% or higher Cocoa and Pistachio Nuts  Banana with almond butter or sunflower seed butter   Apple with peanut butter topped with cinnamon   Baby cucumber or carrots, bell pepper, radishes or sugar snap peas with individual servings of guac   Low sodium cottage with peaches or mandarin oranges   Sunflower seeds with strawberries   Turkey jerky or 100% grass fed beef jerky   Plant based protein bar   Simple [place] Flour Crackers or Kera Gone Cracker dip with hummus or guac   Avocado on slice of gluten free bread recommend base culture brand or 100% whole grain bread     Goal 3: Increase daily movement or physical activity. Start by walking the dog at least 3-4x per week for 15 mins. - on colder days check out some dog friendly stores   Moving our body helps move our bowels and speeds up your metabolism. whether that looks like burst training (CHELSEA), yoga, or vigorous walking.    1. Start small. Take small steps towards increasing your physical activity, so that your body can adjust safely and you can take the time to recognize minor benefits and improvements in your everyday life. If walking is where you want to start, incorporate a 15 minute walk into your schedule twice a week for the first week. Then add an extra day the next week. Then add 5 minutes each day. Allow  yourself the room you need to change your approach if needed and monitor your results. Additionally, you can work movement into various parts of your day, like taking a walk on your lunch break, or even just parking farther away from the entrance of a store so that you have to walk more. If stairs are an option where you live or work, take them! Keeping a fitness journal can provide a helpful perspective as you begin to dive deeper into your personal relationship with movement.    2. Longer isn t always better. New research has revealed that we can workout less than previously thought and see even better results. Interval workouts alternate between bursts of high-intensity movements and low-intensity resting periods. The idea is that you can push yourself harder for a short amount of time repeatedly than you can for a continuous, extended period of time, which actually burns more calories and stimulates more muscle growth than steady-state cardio. This approach to exercise can really be incorporated into a wide variety of programs, from walking to weight lifting. It s also more approachable to the busy person s schedule: 30-minutes of a total-body interval circuit is more appealing than an hour on the treadmill to most. Plus, due to the varied nature, people have been found to stick with this type of workout over the long-term, when compared to other types of exercise programs.      Goal 4: Work on mindset shifts to help with motivation - check out the daily affirmations jurgen and motivation jurgen to provide inspiration and accountability on your phone. Set reminders to pop up multiple times throughout the day.     The following are two really good applications to add to phone:      I am - Daily Affirmations   Manifest motivation reminders by Monkey [employer]aps     2.   Motivation - Daily quotes  Reminders to think positive by [employer]     Look into dailygreatness.co green wellness journal.   Dailygreatness Wellness  "includes a daily food and exercise journal, weekly and quarterly goal planners, training and clean eating tips, weekly shopping lists, healthy habit reminders & mindset coaching.     If you're looking for a journal to help you focus on holistic health, wellness, and vitality, this wellness journal is for you.          Goal 5: Improvement Shown: Recommend the following supplements to start   N[employer]ic Naturals Plus vitamin D - 1-2 capsules daily with food ----[email or web address]://[email or web address]/[email or web address]-omega-[email or web address]  Great prenatal - packets by designs for health or [employer] plus one        NUTRITION RESOURCES:  Nicki Arauz's \"It Starts with the Egg: How the Science of Egg Quality Can Help You Get Pregnant Naturally, Prevent Miscarriage, and Improve Your Odds in IVF\"   Low Glycemic Meal Plan by That Clean Life - GF,DF and Sugar Free Meal Plan with Recipes   IFM Elimination Packet - provided in past   Functional Nutrition Fundamentals   Comprehensive Guide  Meal plan with recipes     "

## 2023-01-18 NOTE — PROGRESS NOTES
Medical Nutrition Therapy  Visit Type: Reassessment and intervention    Visit Details    How would you like to obtain your AVS? MyChart  If the correspondence for visit is dropped, how would you like your dietitian to reconnect with you:   call back by phone? Yes    Text to cell phone: [phone]  Will anyone else be joining your video visit or telephone call? No  {If patient encounters technical issues they should call [phone] :93    Type of service:  Video Visit     Start Time: 11:03 AM    End Time:11:51 AM    Originating Location (pt. Location): Home    Distant Location (provider location):  [facility] WORKING VIRTUAL FROM HOME     Platform used for Video Visit: [employer]      Referring Provider: Vy Rajan MD       REASON FOR REFERRAL:   Bev Araujo is a 21 year old female who is interested in Medical Nutrition Therapy (MNT) and education related to weight management.   She is accompanied by self.     Bev Araujo is a 22 year old female who presents today for new weight management nutrition consultation.    Changes since previous consult Yes        Behavior Status:Improvement shown  Barriers Include:Motivation/Ready to change , Lack of time  and staying consistent long term, variety of meal ideas    Goals: What would you like to work on that will help you most over the next several weeks? Meal planning especially breakfast and working on some positive affirmations/motivation reminders, increase movement        She has been staying consistent with supplements. She has been trying to work on gluten and dairy free. She does better avoiding dairy as she knows it is a trigger food that upsets her stomach. She has tried taking lactose free and still had a stomach. She struggles more with avoiding gluten especially at work. She has to be mindful of brining gf options to work. She has been staying away from bread and has not been having in the  house. She needs a little more consistent at avoiding gluten and needs more reminders. She is starting to make a list of symptoms to put on fridge as motivation to remind her not eat those foods. For example gluten makes her feel bloated and uncomfortable and milk gives her heartburn. Needs to find the time and set aside for to meal plan and prep. She has been trying to find some specific foods, meals, snacks to stay gluten and dairy free. She would do popsicle sticks with names of new foods to try and provide variety. She doesn't like to eat the same foods over and over again. She found some of the recipes in the elimination guide helpful. She needs more variety and ideas mainly for breakfast to start. She does have snacks down and keeps healthy options on hand. She is also trying to do more lower carbs and sugar for her diabetes. Her A1c has dropped to 5.6. Her digestive issues have mostly resolved just by avoiding dairy and cutting back on wheat/gluten and sugar. Doesn't seem to be bothered by any other foods. She has gone off birth control and since has been retaining water which affects her weight. Her weight was 315lbs and now 317lbs. She had lost weight initiallly cutting out gluten and dairy but had gained it back. She just got a dog so intends to do more physical exercise such as walking.     Struggling with breakfast tried the smoothies but the recipe she found online wasn't what she thought it would taste like so hasn't tried another smoothie yet but open to it.     Notes from 12/7/22    Changes since previous consult Yes        Behavior Status:Improvement shown  Barriers Include:consistency, sticking to changes long term, meal planning, prepping and cooking to stay on track    Goals: What would you like to work on that will help you most over the next several weeks? Meal planning and getting back to cutting out more wheat/gluten and dairy that has slipped back in. Picking just a few new meals per week to  make and getting back to the healthy breakfast and snack ideas.     She has been doing gluten and dairy free for until Oct 1st she lost about 15lbs. She just started eating the normal stuff she ate before being gluten and dairy free. She was writing what she was feeling and noticed that the dairy made her feel bloated. It all came back when she started and failed at being able to focus on it.     She wants to watch what she eats when shopping at the grocery store. She hasn't tried the smoothies yet. She doesn't eat breakfast too often she works late and has no idea where to start. She needs to focus on it and doesn't know where to start.     She did try the multivitamin and probiotic and they are going well. She has noticed digestive issues since taking the probiotic at first she had lose stools but now she has more consistent stools but depends on day and what she has eaten. Her digestive symptoms have incrased with the dairy and wheat/gluten in diet. She is still is mostly dairy free she has taken out cheese, yogurt and milk. She noticed that the dairy can give her heartburn so trying to stay away. Ex She thought it was tomatoes in the soup but ended up being the milk.     She wants to track her foods more and how it makes her feel to see if that will help keep her on track. She felt great when she reflected on this in her journal in the past. She feels she has enough nutrition resources at this time but just needs to focus on the goals we set at our initial consult for another few weeks so she can get back on track.     She went off the birth control in Oct as wanting to get pregnant. She didn't get the pregnancy books recommended but wants to look into these further.       NUTRITION ASSESSMENT:   Nutritional Goals 2/23/2022   Nutritional Goal Create healthier eating patterns;Create a plan to lose weight        Neurological 2/23/2022   Migraine Headaches Past;Current   Tension Headache Past;Current       No  flowsheet data found.   No flowsheet data found.   No flowsheet data found.    Food Sensitivities 2/23/2022   Lactose intolerance Past;Current      Endocrine 2/23/2022   Type 2 diabetes Current   Overweight/obesity Current      Skin 2/23/2022   Dry Skin Current      No flowsheet data found.   No flowsheet data found.   Psychological 2/23/2022   ADD/ADHD/Asperger's Current   Depression/Anxiety Current      No flowsheet data found.   Womens Health Assessment 2/23/2022   Hysterectomy No   I am pregnant.  No   I am breastfeeding. No   I want to become pregnant within the next year . Yes   What do you use for contraception?  birth control pill   Any problems with current birth control method?   No   Date of last menstrual period 08741   Are your periods irregular? No   Days from the start of one period to the next. 24   Days of Menstral Flow 6   Associated with menstral periods. Bloating;Water retention;Diarrhea/constipation   Are you officially in menopause? (no period for one year or longer)  No        Past Medical History:  Past Medical History:   Diagnosis Date     Chronic kidney disease      Diabetes (H)     prediabetic       Previous Surgeries:   No past surgical history on file.     Family History:  Family History   Problem Relation Age of Onset     Hypertension Mother      Glaucoma Mother      Diabetes Maternal Grandfather      Diabetes Paternal Grandmother      Hypertension Paternal Grandmother      Diabetes Brother      Glaucoma Brother      Heart Disease Father      Other Cancer Maternal Grandmother         ovarian     Cancer Maternal Grandmother      Glaucoma Maternal Grandmother      Macular Degeneration Maternal Grandmother         Lifestyle History:  Lifestyle 2/23/2022   Do you feel your life is stressful right now?  Yes   What is the cause(s) of stress in your life?  Work;Multi-tasking and multiple deadlines to meet;Over thinking and too much analysis;Taking care of parents, children or other family  member   Are you currently implementing any strategies to help manage stress? No        Exercise History:  Exercise 2/23/2022   Does your occupation require extended periods of sitting?  No   Does your occupation require extended periods of repetitive movements (ex: walking or lifting)?  Yes   Do you currently participate in any forms of exercise? No   Rate your level of motivation for including exercise in your routine (0=none, 10=high): 7   Do you have any medical conditions, pain, injuries, surgeries etc. restricting you from exercise? No        Sleep History:  Sleep 2/23/2022   How many hours (on average) do you sleep per night? 7-9   What time do you turn off the lights? 12 PM   How long does it take for you to fall asleep? 30-45 mins   What time do you stop using electronic devices? 12 PM   What time do you wake up? 9 AM   When do you eat your first meal?  10 AM   Do you feel well-rested during the day?  No   Do you take naps?  No   Do you have a comfortable bedroom environment (cool, quiet, dark, etc)? Yes   Do you have a sleep routine/ ritual that you do before bed?  No   How many hours do you spend per day looking at a screen (TV, computer, tablet and phone)? 3 to 4   Select all factors that apply to your current sleep habits: Have difficulty waking up in the morning;Eat large meals within 3 hours of going to bed;Feel tired/sluggish/fatigued during the day;Have tried supplements (ie: melatonin) for sleep        Nutrition History:  Nutrition 2/23/2022   Have you ever had a nutrition consultation? No   Do you currently follow a special diet or nutritional program? No   What do you feel are the biggest barriers getting in the way of achieving you nutritional goals? Lack of time;Lack of prep/cooking skills;Work (such as lack of time to eat, unhealthy choices, etc.);Lack of nutrition knowledge;Other   Do you have any food allergies, sensitivities or intolerances?  No       Digestion 2/23/2022   Do you experience  stomach pains/cramping? Rarely   Do you experience bloating?  Weekly   Do you experience gas?  Rarely   Do you experience heartburn/acid reflux/indigestion? Weekly   How often do you have a bowel movement? 2 times per day   What is a typical bowel movement like for you? Select all that apply: Formed and soft      Food Access:  2/23/2022   Who does the grocery shopping? Self;Spouse/Partner   How often is grocery shopping done? Weekly   Where do you usually receive your groceries from? Select all that apply: [employer]   Do you read food labels? Yes   What do you look at?  Calories;Lactose Free;Carbs;Sugar;Protein   Who does the cooking? Select all that apply: Self;Spouse/Partner   How many meals do you eat out per week?  1 to 3   What restaurants do you typically choose? Fast Food (Taco [employer], [employer], Subway, etc.)      Daily Patterns: 2/23/2022   How many days per week do you have breakfast? 4   How many days per week do you have lunch? 7   How many days per week do you have dinner? 7   How many days per week do you have snacks? 5      Protein Intake: 2/23/2022   How many times per day do you typically consume a protein source(s)? 3   What types of protein do you currently eat?  Ground Beef;Hamburgers;[place];Chicken Breast;Deli Chicken;Chicken Sausage;Other Chicken;Turkey Breast;Ground Turkey;Deli Turkey;Turkey Burnette;Eggs       Fat Intake:  2/23/2022   How many times per day do you typically consume healthy fat(s)? 2   What types of health fats do you currently eat? Select all that apply:  Almonds;Pecans;Cashews;Peanut butter;Coconut oil;Avocado oil;Olive oil;Avocado       Fruit Intake:  2/23/2022   How many times per day do you typically consume fruits? 1   What types of fruit do currently eat? Cherries;Dates;Mandarin oranges;Pears;Pineapple;Watermelon       Vegetable Intake:  2/23/2022   How many times per day do you typically consume vegetables? 3   What types of vegetables do you currently  eat? Asparagus;Broccoli;[place] sprouts;Carrots;Cauliflower;Celery;Corn;Cucumber;Green onions/scallions;Onions;Potato (baked, boiled, mashed, French fries);Tomato;Water chestnuts      Grain Intake:  2/23/2022   How many times per day do you typically consume grains? 3   What types of grains do currently eat? Select all that apply:  Bagel (non-gluten free);Breads (non-gluten free)       Dairy Intake:  2/23/2022   How many times per day do you typically consume dairy? 3   What types of dairy do currently eat? Select all that apply:  Milk       Non-Dairy Alternative Intake:  2/23/2022   How many times per day do you typically consume non-dairy alternatives? 0   What types of non-dairy alternatives do currently eat? Select all that apply:  Coconut milk;Oat milk       Sweets Intake:  2/23/2022   How many times per day do you typically consume sweets? 2      Beverage Intake:  2/23/2022   How many 8 oz cups of water do you typically consume per day?  4 to 5   How many 8 oz cups of caffeine do you typically consume per day?  1 to 2   How many drinks of alcohol do you typically consume per week (1 drink = 5 oz wine, 12 oz beer, 1.5 oz spirits)?   0       Lifestyle Recall:  2/23/2022   What time did you wake up? 7 AM   What time did you go to sleep? 12 PM   What time did you have breakfast? 8-9 AM   Where did you have breakfast?  Restaurant   What time did you have a morning snack? No snack   What time did you have lunch? 11AM-12 PM   Where did you have lunch?  Home   What time did you have an afternoon snack? 3-4 PM   Where did you have your afternoon snack? Work   What time did you have dinner? 9-10 PM   Where did you have dinner?  Work   What time did you have an evening snack? No Snack   Where did you exercise? Other          Additional concerns: takes medications and since starting lost about 10-13lbs. Recently moved in with grandma. She prefers chicken over red meat.   Breakfast: Shake - powder that she mixes with milk  or water and protein bar     MEDICATIONS:  Current Outpatient Medications   Medication Sig Dispense Refill     blood glucose (NO BRAND SPECIFIED) lancets standard Use to test blood sugar 2 times daily or as directed. 100 each 1     blood glucose monitoring (NO BRAND SPECIFIED) meter device kit Use to test blood sugar 2 times daily or as directed. 1 kit 0     Continuous Blood Gluc  (FREESTYLE ANNE MARIE 2 READER) JOSESITO 1 each daily Use as directed with Anne Marie continuous glucose system. (Patient not taking: Reported on 8/26/2022) 1 each 0     dexamethasone (DECADRON) 1 MG tablet Take 1 tablet (1 mg) by mouth once for 1 dose 1 tablet 0     hydrOXYzine (ATARAX) 25 MG tablet Take 25 mg by mouth every 4 hours as needed        liraglutide (VICTOZA PEN) 18 MG/3ML solution ADMINISTER 1.8 MG UNDER THE SKIN DAILY 27 mL 3     metFORMIN (GLUCOPHAGE) 500 MG tablet Take 1 tablet (500 mg) by mouth 2 times daily (with meals) 180 tablet 3     Multiple Vitamins-Minerals (MULTIVITAMIN ADULTS PO)        naproxen (NAPROSYN DR) 500 MG EC tablet Take 500 mg by mouth 2 times daily as needed (pain) 60 tablet 0     norethindrone (MICRONOR) 0.35 MG tablet Take 1 tablet (0.35 mg) by mouth daily 84 tablet 0     ONETOUCH ULTRA test strip USE TO TEST BLOOD SUGARS TWICE DAILY OR AS DIRECTED 100 strip 11     Probiotic Product (PROBIOTIC DAILY PO)          No flowsheet data found.      ALLERGIES:   No Known Allergies     .na  LABS:  Last Basic Metabolic Panel:  Lab Results   Component Value Date     03/03/2022     12/02/2021     11/16/2020     10/03/2018      Lab Results   Component Value Date    POTASSIUM 3.9 03/03/2022    POTASSIUM 4.2 12/02/2021    POTASSIUM 4.1 11/16/2020    POTASSIUM 4.1 10/03/2018     Lab Results   Component Value Date    CHLORIDE 111 03/03/2022    CHLORIDE 107 12/02/2021    CHLORIDE 110 11/16/2020    CHLORIDE 110 10/03/2018     Lab Results   Component Value Date    SEGUN 8.5 03/03/2022    SEGUN 8.7  12/02/2021    SEGUN 8.3 11/16/2020    SEGUN 8.8 10/03/2018     Lab Results   Component Value Date    CO2 25 03/03/2022    CO2 25 12/02/2021    CO2 24 11/16/2020    CO2 25 10/03/2018     Lab Results   Component Value Date    BUN 10 03/03/2022    BUN 9 12/02/2021    BUN 11 11/16/2020    BUN 13 10/03/2018     Lab Results   Component Value Date    CR 0.54 03/03/2022    CR 0.61 12/02/2021    CR 0.58 11/16/2020    CR 0.55 10/03/2018     Lab Results   Component Value Date    GLC 97 03/03/2022     12/02/2021     11/16/2020     08/13/2020     07/17/2019       Last Glucose Profile:   Hemoglobin A1C   Date Value Ref Range Status   12/08/2022 5.6 0.0 - 5.6 % Final     Comment:     Normal <5.7%   Prediabetes 5.7-6.4%    Diabetes 6.5% or higher     Note: Adopted from ADA consensus guidelines.   06/13/2022 5.6 0.0 - 5.6 % Final     Comment:     Normal <5.7%   Prediabetes 5.7-6.4%    Diabetes 6.5% or higher     Note: Adopted from ADA consensus guidelines.   03/03/2022 5.4 0.0 - 5.6 % Final     Comment:     Normal <5.7%   Prediabetes 5.7-6.4%    Diabetes 6.5% or higher     Note: Adopted from ADA consensus guidelines.   07/05/2021 5.3 0 - 5.6 % Final     Comment:     Normal <5.7% Prediabetes 5.7-6.4%  Diabetes 6.5% or higher - adopted from ADA   consensus guidelines.     03/31/2021 5.7 (H) 0 - 5.6 % Final     Comment:     Normal <5.7% Prediabetes 5.7-6.4%  Diabetes 6.5% or higher - adopted from ADA   consensus guidelines.     11/16/2020 5.6 0 - 5.6 % Final     Comment:     Normal <5.7% Prediabetes 5.7-6.4%  Diabetes 6.5% or higher - adopted from ADA   consensus guidelines.         Last Lipid Profile:   Cholesterol   Date Value Ref Range Status   03/03/2022 138 <200 mg/dL Final   08/13/2020 196 <200 mg/dL Final   07/17/2019 158 <200 mg/dL Final   11/06/2017 166 <170 mg/dL Final     HDL Cholesterol   Date Value Ref Range Status   08/13/2020 40 (L) >49 mg/dL Final   07/17/2019 36 (L) >49 mg/dL Final   11/06/2017  45 (L) >45 mg/dL Final     Comment:     Low:             <40 mg/dl  Borderline low:   40-45 mg/dl       Direct Measure HDL   Date Value Ref Range Status   03/03/2022 39 (L) >=50 mg/dL Final     LDL Cholesterol Calculated   Date Value Ref Range Status   03/03/2022 75 <=100 mg/dL Final   08/13/2020 106 (H) <100 mg/dL Final     Comment:     Above desirable:  100-129 mg/dl  Borderline High:  130-159 mg/dL  High:             160-189 mg/dL  Very high:       >189 mg/dl     07/17/2019 82 <100 mg/dL Final     Comment:     Desirable:       <100 mg/dl   11/06/2017 88 <110 mg/dL Final     Triglycerides   Date Value Ref Range Status   03/03/2022 119 <150 mg/dL Final   08/13/2020 250 (H) <150 mg/dL Final     Comment:     Borderline high:  150-199 mg/dl  High:             200-499 mg/dl  Very high:       >499 mg/dl  Fasting specimen     07/17/2019 199 (H) <150 mg/dL Final     Comment:     Borderline high:  150-199 mg/dl  High:             200-499 mg/dl  Very high:       >499 mg/dl  Fasting specimen     11/06/2017 164 (H) <90 mg/dL Final     Comment:     Borderline high:   mg/dl  High:            >129 mg/dl  Fasting specimen       No results found for: CHOLHDLRATIO    Most recent CBC:  Recent Labs   Lab Test 05/17/21  1729 10/03/18  1530   WBC 8.3 10.8   HGB 13.3 13.3   HCT 39.1 39.9    359     Most recent hepatic panel:  Recent Labs   Lab Test 12/02/21  1052 10/03/18  1530   ALT 49 80*   AST 25 39     Most recent creatinine:  Recent Labs   Lab Test 03/03/22  1218 12/02/21  1052   CR 0.54 0.61       No components found for: GFRESETIMATEDLASTLAB(gfrestblack:1@  Lab Results   Component Value Date    ALBUMIN 3.4 12/02/2021    ALBUMIN 3.3 10/03/2018       Last Thyroid Profile:   TSH   Date Value Ref Range Status   01/06/2023 1.22 0.40 - 4.00 mU/L Final   05/17/2021 1.24 0.40 - 4.00 mU/L Final   08/13/2020 1.09 0.40 - 4.00 mU/L Final   07/17/2019 1.28 0.40 - 4.00 mU/L Final       Last Mineral Profile:   No results found for:  "GIANNA, IRON, FEB    Autoimmune & Inflammatory   No results found for: CRP      Last Vitamin Profile:   No results found for: FLU873, HKDH290, TBWO92ICIFU, VITD3, D2VIT, D3VIT, DTOT, [id number], [id number], [id number], [id number], [id number], [id number]    ANTHROPOMETRICS:  Vitals:   BP Readings from Last 1 Encounters:   01/06/23 (!) 152/85     Pulse Readings from Last 1 Encounters:   01/06/23 76     Estimated body mass index is 47.36 kg/m  as calculated from the following:    Height as of 1/6/23: 1.734 m (5' 8.25\").    Weight as of 1/6/23: 142.3 kg (313 lb 12.8 oz).    Wt Readings from Last 5 Encounters:   01/06/23 142.3 kg (313 lb 12.8 oz)   08/26/22 140.3 kg (309 lb 4.8 oz)   03/25/22 141.9 kg (312 lb 12.8 oz)   03/03/22 142.3 kg (313 lb 12.8 oz)   11/02/21 140.6 kg (310 lb)       .NUTRITION DIAGNOSIS:   1. Altered nutrition-related laboratory values related to endocrine dysfunction as evidenced by elevated BMI, past elevated HbA1c     2. Altered nutrition-related laboratory values related to cardiac as evidenced by elevated past elevated TG, past elevated LDL, and current low HDL.     3. Overweight/Obesity related to food and nutrition related knowledge deficit (excessive CHO intake, Inadequate fiber intake, inappropriate intake of healthy omega 3 fatty acids) as evidenced by nutrition intake record (limited variety of foods), past elevated labs.    4. Overweight/obesity related to inability to sustain diet/lifestyle change, as evidenced by many previous attempts at weight loss with weight regain over the past five years. Has seen improvement in lab values for recently       NUTRITION INTERVENTION:   Nutrition Education (Application):  Low Glycemic Meal Plan with Recipes- GF,DF, low sugar -materials by that clean life      Discussed healthy eating patters breakfast and snacks whole grains, fats, protein & fiber    Reviewed labels focusing on carb gluten, dairy free and low, sugar    Reviewed and planned out some " recipes for breakfast and snacks     Long Term Goals:   Goal: Lipid profile; Maintain TG <150 mg/dL, Increase HDL > 49mg/dL, Maintain LDL <100mg/dL within 6 months   Goal: Maintain Hemoglobin A1c <6 % within 2-6 months   Goal: Lose 20-24lbs in 6 months, recommend average 1-2lbs per week of weight loss.           Short Term Goals:  Goal 1: Focus on having a healthy balance breakfast daily for the next six weeks. See meal plan with recipes reviewed during consult continue with gluten free, dairy free and low sugar.   Incorporate a whole grain at breakfast ex oatmeal naturally gluten free with more healthy fats such as handful of walnuts and 1 fruit serving 15g of carbs such as blueberries. Try a lean protein on side such as 2 eggs, 2 turkey or chicken sausage.    Try a smoothie 1-2x a week for breakfast for the next six weeks see recipe discussed. add in unsweetened flax, hemp or coconut milk, 1 tbsp of healthy fat such as flax seed ground or satnam seed ground. serving of fruit 1 cup of berries. Veggie (optional), 1 scoop of plant based protein powder or collagen powder, 1 serving of fruit and veggie such as kale, spinach, or veggie powder even spirulina     Avocado on slice of gluten free bread recommend base [employer] brand      https://[employer]/?[email or web address]     Birch campbell brand for pancakes - paleo  [email or web address]-[email or web address]     Try siete soft taco shells with eggs and veggies in am for breakfast   [email or web address]-[email or web address]-jfyjpzeih-[email or web address]-pack         Goal 2:  Continue having 1 snack daily between meals that includes 1 fruit serving 15g of carbs and healthy fat and or lean protein for the next eight weeks.   - see meal plan and recipe ideas in the cardi metabolic guides provided.       2 hard boiled eggs.     Greek Yogurt with  Blackberries    Fresh Yellow Pear or veggie (radishes, bell peppers, baby cucumbers, carrots or sugar snap peas) with Hummus - try the mini to go packs to help with portion control     Marinated Olives* and Kefir     Purple Plum with Mixed Nuts    Celery with [place] Butter    Dark Chocolate, 70% or higher Cocoa and Pistachio Nuts    Banana with almond butter or sunflower seed butter     Apple with peanut butter topped with cinnamon     Baby cucumber or carrots, bell pepper, radishes or sugar snap peas with individual servings of guac     Low sodium cottage with peaches or mandarin oranges     Sunflower seeds with strawberries     Turkey jerky or 100% grass fed beef jerky     Plant based protein bar     [place] Flour Crackers or Kera Gone Cracker dip with hummus or guac     Avocado on slice of gluten free bread recommend base culture brand or 100% whole grain bread     Goal 3: Increase daily movement or physical activity. Start by walking the dog at least 3-4x per week for 15 mins. - on colder days check out some dog friendly stores   Moving our body helps move our bowels and speeds up your metabolism. whether that looks like burst training (CHELSEA), yoga, or vigorous walking.    1. Start small. Take small steps towards increasing your physical activity, so that your body can adjust safely and you can take the time to recognize minor benefits and improvements in your everyday life. If walking is where you want to start, incorporate a 15 minute walk into your schedule twice a week for the first week. Then add an extra day the next week. Then add 5 minutes each day. Allow yourself the room you need to change your approach if needed and monitor your results. Additionally, you can work movement into various parts of your day, like taking a walk on your lunch break, or even just parking farther away from the entrance of a store so that you have to walk more. If stairs are an option where you live or work, take  them! Keeping a fitness journal can provide a helpful perspective as you begin to dive deeper into your personal relationship with movement.    2. Longer isn t always better. New research has revealed that we can workout less than previously thought and see even better results. Interval workouts alternate between bursts of high-intensity movements and low-intensity resting periods. The idea is that you can push yourself harder for a short amount of time repeatedly than you can for a continuous, extended period of time, which actually burns more calories and stimulates more muscle growth than steady-state cardio. This approach to exercise can really be incorporated into a wide variety of programs, from walking to weight lifting. It s also more approachable to the busy person s schedule: 30-minutes of a total-body interval circuit is more appealing than an hour on the treadmill to most. Plus, due to the varied nature, people have been found to stick with this type of workout over the long-term, when compared to other types of exercise programs.      Goal 4: Work on mindset shifts to help with motivation - check out the daily affirmations jurgen and motivation jurgen to provide inspiration and accountability on your phone. Set reminders to pop up multiple times throughout the day.     The following are two really good applications to add to phone:      1. I am - Daily Affirmations   Manifest motivation reminders by Monkey Taps     2.   Motivation - Daily quotes  Reminders to think positive by Monkey Taps     Look into dailygreatness.co Outcome Referrals wellness journal.   Dailygreatness Wellness includes a daily food and exercise journal, weekly and quarterly goal planners, training and clean eating tips, weekly shopping lists, healthy habit reminders & mindset coaching.     If you're looking for a journal to help you focus on holistic health, wellness, and vitality, this wellness journal is for you.          Goal 5: Improvement Shown:  "Recommend the following supplements to start     Nordic Naturals Plus vitamin D - 1-2 capsules daily with food ----https://[email or web address].dscout/consumers/ultimate-omega-d3    Great prenatal - packets by designs for health or orderTalkcis plus one        NUTRITION RESOURCES:  1. Nicki Fett's \"It Starts with the Egg: How the Science of Egg Quality Can Help You Get Pregnant Naturally, Prevent Miscarriage, and Improve Your Odds in IVF\"   2. Low Glycemic Meal Plan by That Clean Life - GF,DF and Sugar Free Meal Plan with Recipes   3. IFM Elimination Packet - provided in past     Functional Nutrition Fundamentals     Comprehensive Guide    Meal plan with recipes           PATIENT'S BEHAVIOR CHANGE GOALS:   See nutrition intervention for patient stated behavior change goals.    MONITOR / EVALUATE:  Registered Dietitian will monitor/evaluate the following:     Beliefs and attitudes related to food    Food and nutrition knowledge / skills    Food / Beverage / Nutrient intake     Pertinent Labs    Progress toward meeting stated nutrition-related goals    Readiness to change nutrition-related behaviors    Weight change    Digestion     COORDINATION OF CARE:  Follow up with primary care provider as needed      FOLLOW-UP:  Follow-up appointment recommend on March 29th at 10am video visit     Time spent in minutes: 45 minutes 3 units   Encounter: Individual    Altagracia Yap RD, CLT, LD  Integrative Registered Dietitian         "

## 2023-01-20 ENCOUNTER — TELEPHONE (OUTPATIENT)
Dept: ENDOCRINOLOGY | Facility: CLINIC | Age: 23
End: 2023-01-20

## 2023-01-20 NOTE — TELEPHONE ENCOUNTER
Prior Authorization Retail Medication Request    Medication/Dose: Victoza 18mg/3mL qty 9 for 30 days  ICD code (if different than what is on RX):    Previously Tried and Failed:  n/a  Rationale:  n/a    Insurance Name:  [employer]    Insurance ID:  [id number] - spouse      Pharmacy Information (if different than what is on RX)  Name:  [facility] [place]  Phone:  [phone]

## 2023-01-27 NOTE — TELEPHONE ENCOUNTER
Central Prior Authorization Team   Phone: [phone]    PA Initiation    Medication: Victoza 18mg/3mL qty 9 for 30 days  Insurance Company: [employer] ([facility]) - Phone [phone] Fax [phone]  Ph[facility]illing the Rx[facility] [place] [facility]  Filling Pharmacy Phone: [phone]  Filling Pharmacy Fax:    Start Date: 1/27/2023

## 2023-01-31 NOTE — TELEPHONE ENCOUNTER
Prior Authorization Approval    Authorization Effective Date: 1/27/2023  Authorization Expiration Date: 1/27/2024  Medication: Victoza 18mg/3mL qty 9 for 30 days  Approved Dose/Quantity:   Reference #:     Insurance Company: [employer] ([facility]) - Phone [phone] Fax [phone]  Expected CoPay:       CoPay Card Available:      Foundation Assistance Needed:    Which Pharmacy is filling the prescription (Not needed for infusion/clinic administered): [facility]  Pharmacy Notified: Yes  Patient Notified: Yes

## 2023-02-01 ENCOUNTER — OFFICE VISIT (OUTPATIENT)
Dept: URGENT CARE | Facility: URGENT CARE | Age: 23
End: 2023-02-01
Payer: COMMERCIAL

## 2023-02-01 VITALS
TEMPERATURE: 98.6 F | OXYGEN SATURATION: 95 % | HEART RATE: 94 BPM | SYSTOLIC BLOOD PRESSURE: 188 MMHG | DIASTOLIC BLOOD PRESSURE: 129 MMHG | BODY MASS INDEX: 47.39 KG/M2 | WEIGHT: 293 LBS

## 2023-02-01 DIAGNOSIS — E11.9 TYPE 2 DIABETES MELLITUS WITHOUT COMPLICATION, WITH LONG-TERM CURRENT USE OF INSULIN (H): ICD-10-CM

## 2023-02-01 DIAGNOSIS — Z79.4 TYPE 2 DIABETES MELLITUS WITHOUT COMPLICATION, WITH LONG-TERM CURRENT USE OF INSULIN (H): ICD-10-CM

## 2023-02-01 DIAGNOSIS — J06.9 VIRAL UPPER RESPIRATORY TRACT INFECTION WITH COUGH: ICD-10-CM

## 2023-02-01 DIAGNOSIS — H66.003 ACUTE SUPPURATIVE OTITIS MEDIA OF BOTH EARS WITHOUT SPONTANEOUS RUPTURE OF TYMPANIC MEMBRANES, RECURRENCE NOT SPECIFIED: Primary | ICD-10-CM

## 2023-02-01 PROCEDURE — 99214 OFFICE O/P EST MOD 30 MIN: CPT | Performed by: PHYSICIAN ASSISTANT

## 2023-02-01 ASSESSMENT — ENCOUNTER SYMPTOMS
CARDIOVASCULAR NEGATIVE: 1
SINUS PRESSURE: 1
COUGH: 1
HEADACHES: 0
PALPITATIONS: 0
ALLERGIC/IMMUNOLOGIC NEGATIVE: 1
ARTHRALGIAS: 0
JOINT SWELLING: 0
ENDOCRINE NEGATIVE: 1
CHILLS: 0
VOMITING: 0
FEVER: 0
NECK STIFFNESS: 0
WOUND: 0
MYALGIAS: 0
MUSCULOSKELETAL NEGATIVE: 1
WEAKNESS: 0
BACK PAIN: 0
EYES NEGATIVE: 1
LIGHT-HEADEDNESS: 0
SORE THROAT: 0
SHORTNESS OF BREATH: 0
NAUSEA: 0
DIZZINESS: 0
NECK PAIN: 0
RHINORRHEA: 0
DIARRHEA: 0

## 2023-02-01 NOTE — LETTER
[facility] URGENT CARE [address] [address]          February 1, 2023    RE:  Bev Asia Sherman Araujo                                                                                                                                                       [address] [place] [address]            To whom it may concern:    Bev Posadasra is under my professional care for illness.  She  may return to work with no restrictions when fever free.      Sincerely,        Alessandro Mckay PA-C

## 2023-02-01 NOTE — PROGRESS NOTES
Chief Complaint:     Chief Complaint   Patient presents with     URI     Sinus infection? Facial pressure,teeth pain,congestion x 24 hours        No results found for any visits on 02/01/23.    Medical Decision Making:    Vital signs reviewed by Alessandro Mckay PA-C  BP (!) 188/129   Pulse 94   Temp 98.6  F (37  C) (Tympanic)   Wt 142.4 kg (314 lb)   SpO2 95%   BMI 47.39 kg/m      Differential Diagnosis:  URI Adult/Peds:  Sinusitis, Viral syndrome and Viral upper respiratory illness        ASSESSMENT    1. Acute suppurative otitis media of both ears without spontaneous rupture of tympanic membranes, recurrence not specified    2. Viral upper respiratory tract infection with cough    3. Type 2 diabetes mellitus without complication, with long-term current use of insulin (H)        PLAN    Patient is in no acute distress.    Temp is 98.6 in clinic today, lung sounds were clear, and O2 sats at 95% on RA.    Rx for Augmentin for ear infection.  Rest, Push fluids, vaporizer, elevation of head of bed.  Ibuprofen and or Tylenol for any fever or body aches.  Patient at higher risk for severe infection with DM.  Patient instructed to monitor blood sugars closely with illness.  Continue taking your Metformin, and Liraglutide and follow up with your primary provider for any DM medication changes if needed.  If symptoms worsen, recheck immediately otherwise follow up with your PCP in 1 week if symptoms are not improving.  Worrisome symptoms discussed with instructions to go to the ED.  Patient verbalized understanding and agreed with this plan.    Labs:    No results found for any visits on 02/01/23.     Vital signs reviewed by Alessandro Mckay PA-C  BP (!) 188/129   Pulse 94   Temp 98.6  F (37  C) (Tympanic)   Wt 142.4 kg (314 lb)   SpO2 95%   BMI 47.39 kg/m      Current Meds      Current Outpatient Medications:      amoxicillin-clavulanate (AUGMENTIN) 875-125 MG tablet, Take 1 tablet by mouth 2 times daily for 10  days, Disp: 20 tablet, Rfl: 0     blood glucose (NO BRAND SPECIFIED) lancets standard, Use to test blood sugar 2 times daily or as directed., Disp: 100 each, Rfl: 1     blood glucose monitoring (NO BRAND SPECIFIED) meter device kit, Use to test blood sugar 2 times daily or as directed., Disp: 1 kit, Rfl: 0     hydrOXYzine (ATARAX) 25 MG tablet, Take 25 mg by mouth every 4 hours as needed , Disp: , Rfl:      liraglutide (VICTOZA PEN) 18 MG/3ML solution, ADMINISTER 1.8 MG UNDER THE SKIN DAILY, Disp: 27 mL, Rfl: 3     metFORMIN (GLUCOPHAGE) 500 MG tablet, Take 1 tablet (500 mg) by mouth 2 times daily (with meals), Disp: 180 tablet, Rfl: 3     naproxen (NAPROSYN DR) 500 MG EC tablet, Take 500 mg by mouth 2 times daily as needed (pain), Disp: 60 tablet, Rfl: 0     ONETOUCH ULTRA test strip, USE TO TEST BLOOD SUGARS TWICE DAILY OR AS DIRECTED, Disp: 100 strip, Rfl: 11     Probiotic Product (PROBIOTIC DAILY PO), , Disp: , Rfl:       Respiratory History    occasional episodes of bronchitis      SUBJECTIVE    HPI: Bev Araujo is an 22 year old female who presents with facial pain/pressure and nasal congestion.  Symptoms began 1  days ago and has unchanged.  There is no shortness of breath, wheezing and chest pain.  Patient is eating and drinking well.  No fever, nausea, vomiting, or diarrhea.    Patient denies any recent travel or exposure to known COVID positive tested individual.      ROS:     Review of Systems   Constitutional: Negative for chills and fever.   HENT: Positive for congestion and sinus pressure. Negative for ear pain, rhinorrhea and sore throat.    Eyes: Negative.    Respiratory: Positive for cough. Negative for shortness of breath.    Cardiovascular: Negative.  Negative for chest pain and palpitations.   Gastrointestinal: Negative for diarrhea, nausea and vomiting.   Endocrine: Negative.    Genitourinary: Negative.    Musculoskeletal: Negative.  Negative for arthralgias, back pain, joint  swelling, myalgias, neck pain and neck stiffness.   Skin: Negative.  Negative for rash and wound.   Allergic/Immunologic: Negative.  Negative for immunocompromised state.   Neurological: Negative for dizziness, weakness, light-headedness and headaches.         Family History   Family History   Problem Relation Age of Onset     Hypertension Mother      Glaucoma Mother      Diabetes Maternal Grandfather      Diabetes Paternal Grandmother      Hypertension Paternal Grandmother      Diabetes Brother      Glaucoma Brother      Heart Disease Father      Other Cancer Maternal Grandmother         ovarian     Cancer Maternal Grandmother      Glaucoma Maternal Grandmother      Macular Degeneration Maternal Grandmother         Problem history  Patient Active Problem List   Diagnosis     Morbid obesity (H)     Attention deficit disorder     Right-sided low back pain without sciatica, unspecified chronicity     Elevated BP without diagnosis of hypertension     Elevated fasting blood sugar     Type 2 diabetes mellitus without complication, with long-term current use of insulin (H)     Morbid obesity with BMI of 45.0-49.9, adult (H)        Allergies  No Known Allergies     Social History  Social History     Socioeconomic History     Marital status:      Spouse name: Not on file     Number of children: Not on file     Years of education: Not on file     Highest education level: Not on file   Occupational History     Not on file   Tobacco Use     Smoking status: Never     Smokeless tobacco: Never     Tobacco comments:     Dad smokes outside   Vaping Use     Vaping Use: Never used   Substance and Sexual Activity     Alcohol use: Yes     Comment: occ     Drug use: No     Sexual activity: Yes     Partners: Male     Comment: never   Other Topics Concern     Parent/sibling w/ CABG, MI or angioplasty before 65F 55M? Not Asked   Social History Narrative     Not on file     Social Determinants of Health     Financial Resource Strain:  Not on file   Food Insecurity: Not on file   Transportation Needs: Not on file   Physical Activity: Not on file   Stress: Not on file   Social Connections: Not on file   Intimate Partner Violence: Not on file   Housing Stability: Not on file        OBJECTIVE     Vital signs reviewed by Alessandro Mckay PA-C  BP (!) 188/129   Pulse 94   Temp 98.6  F (37  C) (Tympanic)   Wt 142.4 kg (314 lb)   SpO2 95%   BMI 47.39 kg/m       Physical Exam  Vitals and nursing note reviewed.   Constitutional:       General: She is not in acute distress.     Appearance: She is well-developed. She is not ill-appearing, toxic-appearing or diaphoretic.   HENT:      Head: Normocephalic and atraumatic.      Right Ear: External ear normal. No drainage, swelling or tenderness. Tympanic membrane is erythematous and bulging. Tympanic membrane is not perforated or retracted.      Left Ear: External ear normal. No drainage, swelling or tenderness. Tympanic membrane is erythematous and bulging. Tympanic membrane is not perforated or retracted.      Nose: No mucosal edema, congestion or rhinorrhea.      Right Sinus: No maxillary sinus tenderness or frontal sinus tenderness.      Left Sinus: No maxillary sinus tenderness or frontal sinus tenderness.      Mouth/Throat:      Pharynx: No pharyngeal swelling, oropharyngeal exudate, posterior oropharyngeal erythema or uvula swelling.      Tonsils: No tonsillar abscesses.   Eyes:      Pupils: Pupils are equal, round, and reactive to light.   Neck:      Trachea: Trachea normal.   Cardiovascular:      Rate and Rhythm: Normal rate and regular rhythm.      Heart sounds: Normal heart sounds, S1 normal and S2 normal. No murmur heard.    No friction rub. No gallop.   Pulmonary:      Effort: Pulmonary effort is normal. No respiratory distress.      Breath sounds: Normal breath sounds. No decreased breath sounds, wheezing, rhonchi or rales.   Abdominal:      General: Bowel sounds are normal. There is no  distension.      Palpations: Abdomen is soft. Abdomen is not rigid. There is no mass.      Tenderness: There is no abdominal tenderness. There is no guarding or rebound.   Musculoskeletal:      Cervical back: Full passive range of motion without pain, normal range of motion and neck supple.   Lymphadenopathy:      Cervical: No cervical adenopathy.   Skin:     General: Skin is warm and dry.   Neurological:      Mental Status: She is alert and oriented to person, place, and time.      Cranial Nerves: No cranial nerve deficit.      Deep Tendon Reflexes: Reflexes are normal and symmetric.   Psychiatric:         Behavior: Behavior normal. Behavior is cooperative.         Thought Content: Thought content normal.         Judgment: Judgment normal.           Alessandro Mckay PA-C  2/1/2023, 1:05 PM

## 2023-04-01 ENCOUNTER — HEALTH MAINTENANCE LETTER (OUTPATIENT)
Age: 23
End: 2023-04-01

## 2023-06-08 ENCOUNTER — OFFICE VISIT (OUTPATIENT)
Dept: OBGYN | Facility: CLINIC | Age: 23
End: 2023-06-08
Payer: COMMERCIAL

## 2023-06-08 VITALS
HEART RATE: 95 BPM | OXYGEN SATURATION: 98 % | DIASTOLIC BLOOD PRESSURE: 99 MMHG | SYSTOLIC BLOOD PRESSURE: 143 MMHG | WEIGHT: 293 LBS | BODY MASS INDEX: 47.7 KG/M2

## 2023-06-08 DIAGNOSIS — N91.2 AMENORRHEA: Primary | ICD-10-CM

## 2023-06-08 DIAGNOSIS — Z31.69 PRE-CONCEPTION COUNSELING: ICD-10-CM

## 2023-06-08 DIAGNOSIS — E66.01 MORBID OBESITY (H): ICD-10-CM

## 2023-06-08 LAB — HCG INTACT+B SERPL-ACNC: <1 MIU/ML

## 2023-06-08 PROCEDURE — 99214 OFFICE O/P EST MOD 30 MIN: CPT | Performed by: OBSTETRICS & GYNECOLOGY

## 2023-06-08 PROCEDURE — 84702 CHORIONIC GONADOTROPIN TEST: CPT | Performed by: OBSTETRICS & GYNECOLOGY

## 2023-06-08 PROCEDURE — 36415 COLL VENOUS BLD VENIPUNCTURE: CPT | Performed by: OBSTETRICS & GYNECOLOGY

## 2023-06-08 RX ORDER — MEDROXYPROGESTERONE ACETATE 10 MG
TABLET ORAL
Qty: 30 TABLET | Refills: 3 | Status: SHIPPED | OUTPATIENT
Start: 2023-06-08 | End: 2024-06-24

## 2023-06-08 NOTE — PATIENT INSTRUCTIONS
If you have any questions regarding your visit, Please contact your care team.    [facility]: [phone]    To Schedule an Appointment 24/7  Call: [phone]   DO. Radha Angulo -Surgery Scheduler  Maryana - Surgery Scheduler    DESTIN Rodriguez, DESTIN Cyr, DESTIN   [place]  Wednesday and Friday  8:30 a.m-5:00 p.m  [place]-Temporary  Monday 8:30 a.m-5:00 p.m  Typical Surgery day:  Tuesday [facility]  [address] [place] [address] [phone] Phone  [phone] Fax    Imaging Scheduling-All Locations [phone]    [facility]  [address] [place] [address]     Urgent Care locations:  [facility] Monday-Friday   10 am - 8 pm  Saturday and Sunday   9 am - 5 pm [phone]   **Surgeries** Our Surgery Schedulers will contact you to schedule. If you do not receive a call within 3 business days, please call [phone].    [facility] Labor and Delivery:  [phone]    If you need a medication refill, please contact your pharmacy. Please allow 3 business days for your refill to be completed.  As always, Thank you for trusting us with your healthcare needs!  see additional instructions from your care team below

## 2023-06-08 NOTE — PROGRESS NOTES
This 22 y/o female, , LMP 2023, presents today c/o the absence of a menses x the past 5 months.  She has run out of Provera and would like a refill since this typically triggers a period within days after taking a 10-day course.  She discontinued taking Micronor last October since she would like to conceive.  Yet, she is not worried that she has not gotten pregnant since her attitude is that if it happens fine, and if it doesn't happen fine.  She is taking a PNV daily and her social hx is negative for nicotine, etoh, and illicit drug abuse.  She requests a SPT today since she does not trust the results of a UPT.  Her last pelvic US on 2022 did not demonstrate evidence of PCOS.  BP (!) 143/99 (BP Location: Right arm, Patient Position: Chair, Cuff Size: Adult Large)   Pulse 95   Wt 143.3 kg (316 lb)   LMP 2023   SpO2 98%   Breastfeeding No   BMI 47.70 kg/m    ROS:  10 systems were reviewed and the positives were listed under problems.  PE:  General- Healthy-appearing female in no acute distress  Eyes- No scleral injection or icterus  ENT- Mucus membranes moist  CV- No noticeable edema in extremities  Lymph- No noticeable cervical adenopathy  Respiratory- Non-labored breathing  Skin- No suspicious lesions or rashes visualized  Psych- Normal affect  Assessment - Secondary amenorrhea felt to be due to chronic anovulation, pre-conceptual counseling, diabetes, and hypertension  Plan - We discussed the use of Provera to trigger a menses and she is comfortable with this plan since it has worked well for her in the past.  However, she will need to check a pregnancy test prior to use each month and hold off using if she is pregnant.  Today's SPT was negative and she denies any recent risk of pregnancy exposure.  She declines a new script for Micronor since she would be happy to conceive.  Therefore, she is to continue taking a PNV daily po and f/u with her PCP for elevated BP issues and diabetes.  30  minutes were spent today in chart review, the patient visit, review of tests, and documentation in regard to the issues noted above.

## 2023-06-28 ENCOUNTER — TRANSFERRED RECORDS (OUTPATIENT)
Dept: HEALTH INFORMATION MANAGEMENT | Facility: CLINIC | Age: 23
End: 2023-06-28
Payer: COMMERCIAL

## 2023-08-03 ENCOUNTER — TRANSFERRED RECORDS (OUTPATIENT)
Dept: HEALTH INFORMATION MANAGEMENT | Facility: CLINIC | Age: 23
End: 2023-08-03
Payer: COMMERCIAL

## 2023-08-27 ENCOUNTER — HEALTH MAINTENANCE LETTER (OUTPATIENT)
Age: 23
End: 2023-08-27

## 2023-09-06 ENCOUNTER — TRANSFERRED RECORDS (OUTPATIENT)
Dept: HEALTH INFORMATION MANAGEMENT | Facility: CLINIC | Age: 23
End: 2023-09-06

## 2023-09-21 ENCOUNTER — TRANSFERRED RECORDS (OUTPATIENT)
Dept: HEALTH INFORMATION MANAGEMENT | Facility: CLINIC | Age: 23
End: 2023-09-21
Payer: COMMERCIAL

## 2023-10-04 ENCOUNTER — OFFICE VISIT (OUTPATIENT)
Dept: URGENT CARE | Facility: URGENT CARE | Age: 23
End: 2023-10-04
Payer: COMMERCIAL

## 2023-10-04 VITALS
BODY MASS INDEX: 49.24 KG/M2 | RESPIRATION RATE: 18 BRPM | DIASTOLIC BLOOD PRESSURE: 102 MMHG | SYSTOLIC BLOOD PRESSURE: 154 MMHG | TEMPERATURE: 98.1 F | WEIGHT: 293 LBS | HEART RATE: 94 BPM | OXYGEN SATURATION: 96 %

## 2023-10-04 DIAGNOSIS — Z79.4 TYPE 2 DIABETES MELLITUS WITHOUT COMPLICATION, WITH LONG-TERM CURRENT USE OF INSULIN (H): ICD-10-CM

## 2023-10-04 DIAGNOSIS — E11.9 TYPE 2 DIABETES MELLITUS WITHOUT COMPLICATION, WITH LONG-TERM CURRENT USE OF INSULIN (H): ICD-10-CM

## 2023-10-04 DIAGNOSIS — H66.001 ACUTE SUPPURATIVE OTITIS MEDIA OF RIGHT EAR WITHOUT SPONTANEOUS RUPTURE OF TYMPANIC MEMBRANE, RECURRENCE NOT SPECIFIED: Primary | ICD-10-CM

## 2023-10-04 PROCEDURE — 99214 OFFICE O/P EST MOD 30 MIN: CPT | Performed by: PHYSICIAN ASSISTANT

## 2023-10-04 RX ORDER — FOLIC ACID/MULTIVIT,IRON,MINER 0.4MG-18MG
TABLET ORAL
COMMUNITY
Start: 2022-09-08

## 2023-10-04 ASSESSMENT — ENCOUNTER SYMPTOMS
DIZZINESS: 0
PALPITATIONS: 0
NECK PAIN: 0
ARTHRALGIAS: 0
LIGHT-HEADEDNESS: 0
WEAKNESS: 0
VOMITING: 0
FEVER: 0
CHILLS: 0
ALLERGIC/IMMUNOLOGIC NEGATIVE: 1
BACK PAIN: 0
SORE THROAT: 0
COUGH: 1
NAUSEA: 0
NECK STIFFNESS: 0
ENDOCRINE NEGATIVE: 1
MUSCULOSKELETAL NEGATIVE: 1
MYALGIAS: 0
CARDIOVASCULAR NEGATIVE: 1
EYES NEGATIVE: 1
JOINT SWELLING: 0
WOUND: 0
HEADACHES: 0
RHINORRHEA: 0
DIARRHEA: 0
SHORTNESS OF BREATH: 0

## 2023-10-04 ASSESSMENT — PAIN SCALES - GENERAL: PAINLEVEL: MILD PAIN (3)

## 2023-10-04 NOTE — PROGRESS NOTES
"Chief Complaint:    Chief Complaint   Patient presents with    Ear Problem     Patient reporting \"plugged\" right ear beginning last night; patient states pain was worse last night. Patient has had cold symptoms for past two weeks - symptoms have improved but began worsening on Friday.     Sinus Problem    Laryngitis       ASSESSMENT    Vital signs reviewed by Alessandro Mckay PA-C  BP (!) 154/102 (BP Location: Left arm, Patient Position: Sitting, Cuff Size: Adult Large)   Pulse 94   Temp 98.1  F (36.7  C) (Tympanic)   Resp 18   Wt 148 kg (326 lb 3.2 oz)   SpO2 96%   BMI 49.24 kg/m       1. Acute suppurative otitis media of right ear without spontaneous rupture of tympanic membrane, recurrence not specified    2. Type 2 diabetes mellitus without complication, with long-term current use of insulin (H)         PLAN  Rx for Augmentin for ear infection.    Fluids, vaporizer, acetaminophen, and or ibuprofen for pain.  Patient at higher risk for severe infection with DM.  Patient instructed to monitor blood sugars closely with illness.  Continue taking your Metformin and Victoza and follow up with your primary provider for any DM medication changes if needed.  Follow up with PCP if symptoms are not improving in 1 week. Sooner if symptoms worsen.   Worrisome symptoms discussed with instructions to go to the ED.  Patient verbalized understanding and agreed with this plan.     LABS:    No results found for any visits on 10/04/23.    Respiratory History  occasional episodes of bronchitis    Current Meds    Current Outpatient Medications:     amoxicillin-clavulanate (AUGMENTIN) 875-125 MG tablet, Take 1 tablet by mouth 2 times daily for 10 days, Disp: 20 tablet, Rfl: 0    Docosahexaenoic Acid (PRENATAL DHA) 200 MG capsule, , Disp: , Rfl:     hydrOXYzine (ATARAX) 25 MG tablet, Take 25 mg by mouth every 4 hours as needed , Disp: , Rfl:     liraglutide (VICTOZA PEN) 18 MG/3ML solution, ADMINISTER 1.8 MG UNDER THE SKIN DAILY, " Disp: 27 mL, Rfl: 3    medroxyPROGESTERone (PROVERA) 10 MG tablet, Take 1 tab daily up to 10 days to trigger a menses each month prn if your UPT is negative, Disp: 30 tablet, Rfl: 3    metFORMIN (GLUCOPHAGE) 500 MG tablet, Take 1 tablet (500 mg) by mouth 2 times daily (with meals), Disp: 180 tablet, Rfl: 3    naproxen (NAPROSYN DR) 500 MG EC tablet, Take 500 mg by mouth 2 times daily as needed (pain), Disp: 60 tablet, Rfl: 0    Probiotic Product (PROBIOTIC DAILY PO), , Disp: , Rfl:     blood glucose (NO BRAND SPECIFIED) lancets standard, Use to test blood sugar 2 times daily or as directed., Disp: 100 each, Rfl: 1    blood glucose monitoring (NO BRAND SPECIFIED) meter device kit, Use to test blood sugar 2 times daily or as directed., Disp: 1 kit, Rfl: 0    ONETOUCH ULTRA test strip, USE TO TEST BLOOD SUGARS TWICE DAILY OR AS DIRECTED, Disp: 100 strip, Rfl: 11    Problem history  Patient Active Problem List   Diagnosis    Morbid obesity (H)    Attention deficit disorder    Right-sided low back pain without sciatica, unspecified chronicity    Elevated BP without diagnosis of hypertension    Elevated fasting blood sugar    Type 2 diabetes mellitus without complication, with long-term current use of insulin (H)    Morbid obesity with BMI of 45.0-49.9, adult (H)       Allergies  Allergies   Allergen Reactions    Nickel Dermatitis, Rash and Swelling       SUBJECTIVE    HPI:Bev Araujo is an 23 year old female who presents for possible ear infection. Symptoms include ear pain on right, plugged sensation on right, congestion, and cough. The ear pain started last night and has improved.  Ear history: few episodes of otitis.    Patient is eating and drinking well.  No fever, diarrhea or vomiting.  No cough, or wheezing.    ROS:    Review of Systems   Constitutional:  Negative for chills and fever.   HENT:  Positive for congestion and ear pain. Negative for ear discharge, rhinorrhea and sore throat.    Eyes:  Negative.    Respiratory:  Positive for cough. Negative for shortness of breath.    Cardiovascular: Negative.  Negative for chest pain and palpitations.   Gastrointestinal:  Negative for diarrhea, nausea and vomiting.   Endocrine: Negative.    Genitourinary: Negative.    Musculoskeletal: Negative.  Negative for arthralgias, back pain, joint swelling, myalgias, neck pain and neck stiffness.   Skin: Negative.  Negative for rash and wound.   Allergic/Immunologic: Negative.  Negative for immunocompromised state.   Neurological:  Negative for dizziness, weakness, light-headedness and headaches.        Family History   Family History   Problem Relation Age of Onset    Hypertension Mother     Glaucoma Mother     Diabetes Maternal Grandfather     Diabetes Paternal Grandmother     Hypertension Paternal Grandmother     Diabetes Brother     Glaucoma Brother     Heart Disease Father     Other Cancer Maternal Grandmother         ovarian    Cancer Maternal Grandmother     Glaucoma Maternal Grandmother     Macular Degeneration Maternal Grandmother         Social History  Social History     Socioeconomic History    Marital status:      Spouse name: Not on file    Number of children: Not on file    Years of education: Not on file    Highest education level: Not on file   Occupational History    Not on file   Tobacco Use    Smoking status: Never     Passive exposure: Past    Smokeless tobacco: Never    Tobacco comments:     Dad smokes outside   Vaping Use    Vaping Use: Never used   Substance and Sexual Activity    Alcohol use: Yes     Comment: occ    Drug use: No    Sexual activity: Yes     Partners: Male     Comment: never   Other Topics Concern    Parent/sibling w/ CABG, MI or angioplasty before 65F 55M? Not Asked   Social History Narrative    Not on file     Social Determinants of Health     Financial Resource Strain: Not on file   Food Insecurity: Not on file   Transportation Needs: Not on file   Physical Activity: Not on  file   Stress: Not on file   Social Connections: Not on file   Interpersonal Safety: Not on file   Housing Stability: Not on file        OBJECTIVE     Physical Exam:     Vital signs reviewed by Alessandro Mckay PA-C  BP (!) 154/102 (BP Location: Left arm, Patient Position: Sitting, Cuff Size: Adult Large)   Pulse 94   Temp 98.1  F (36.7  C) (Tympanic)   Resp 18   Wt 148 kg (326 lb 3.2 oz)   SpO2 96%   BMI 49.24 kg/m       Physical Exam:    Physical Exam  Vitals and nursing note reviewed.   Constitutional:       General: She is not in acute distress.     Appearance: She is well-developed. She is not ill-appearing, toxic-appearing or diaphoretic.   HENT:      Head: Normocephalic and atraumatic.      Right Ear: Hearing, ear canal and external ear normal. No drainage, swelling or tenderness. Tympanic membrane is erythematous and bulging. Tympanic membrane is not perforated or retracted.      Left Ear: Hearing, tympanic membrane, ear canal and external ear normal. No drainage, swelling or tenderness. Tympanic membrane is not perforated, erythematous, retracted or bulging.      Nose: Congestion present. No mucosal edema or rhinorrhea.      Right Sinus: No maxillary sinus tenderness or frontal sinus tenderness.      Left Sinus: No maxillary sinus tenderness or frontal sinus tenderness.      Mouth/Throat:      Pharynx: No pharyngeal swelling, oropharyngeal exudate, posterior oropharyngeal erythema or uvula swelling.      Tonsils: No tonsillar exudate or tonsillar abscesses. 0 on the right. 0 on the left.   Eyes:      General:         Right eye: No discharge.         Left eye: No discharge.      Pupils: Pupils are equal, round, and reactive to light.   Cardiovascular:      Rate and Rhythm: Normal rate and regular rhythm.      Heart sounds: Normal heart sounds. No murmur heard.     No friction rub. No gallop.   Pulmonary:      Effort: Pulmonary effort is normal. No respiratory distress.      Breath sounds: Normal breath  sounds. No decreased breath sounds, wheezing, rhonchi or rales.   Chest:      Chest wall: No tenderness.   Abdominal:      General: Bowel sounds are normal. There is no distension.      Palpations: Abdomen is soft. There is no mass.      Tenderness: There is no abdominal tenderness. There is no guarding.   Musculoskeletal:      Cervical back: Normal range of motion and neck supple.   Lymphadenopathy:      Head:      Right side of head: No submental, submandibular, tonsillar, preauricular or posterior auricular adenopathy.      Left side of head: No submental, submandibular, tonsillar, preauricular or posterior auricular adenopathy.      Cervical: No cervical adenopathy.      Right cervical: No superficial or posterior cervical adenopathy.     Left cervical: No superficial or posterior cervical adenopathy.   Skin:     General: Skin is warm and dry.      Findings: No rash.   Neurological:      Mental Status: She is alert and oriented to person, place, and time.      Cranial Nerves: No cranial nerve deficit.      Deep Tendon Reflexes: Reflexes are normal and symmetric.   Psychiatric:         Behavior: Behavior normal. Behavior is cooperative.         Thought Content: Thought content normal.         Judgment: Judgment normal.            Alessandro Mckay PA-C  10/4/2023, 11:07 AM

## 2023-10-05 ENCOUNTER — TRANSFERRED RECORDS (OUTPATIENT)
Dept: HEALTH INFORMATION MANAGEMENT | Facility: CLINIC | Age: 23
End: 2023-10-05
Payer: COMMERCIAL

## 2023-10-26 NOTE — PROGRESS NOTES
Outcome for 10/26/23 9:40 AM: [employer] message sent  Lauren Dickinson LPN   Outcome for 11/01/23 2:08 PM: Left Voicemail   Lauren Dickinson LPN   Outcome for 11/02/23 9:42 AM: Left Voicemail   Lauren Dickinson LPN   Outcome for 11/03/23 2:24 PM: Data obtained via [employer] website  Jennifer Bentley, MA    Patient is showing 4/5 MNCM met. BP out range   Lauren Dickinson LPN                        Video-Visit Details    Type of service:  Video Visit    Start: 2:38 pm   Stop: 2:55 pm     Originating Location (pt. Location): Home    Distant Location (provider location):  [facility]     Platform used for Video Visit: [facility]                                                                                 - Endocrinology follow up -    Reason for visit/consult: DM2, overweight    Primary care provider: Linnea Mars      Assessment and Plan  23 year old female with DM2, A1C 6.6 last year 2022, and overweight BMI 47    DM2  Reveiwed  CGM, excellent targeted range most of the time     - A1C     - continue  Metformin 500 mg BID      Overweight BMI47  She has been on victoza 1.8 mg for 3 years and only 10 lb lost initially, and currently plateau.     - discussed with her we will try switching to ozempic    Irregular menses  Persistated after stopped BCP and currently on progesterone withdrawal, managed by her PMD      DM complication  Urine microalbumin mildly + since 2018.       RTC with me in 1 year.       30  minutes spent on the date of the encounter doing chart review, history and exam, documentation and further activities as noted above.    Vy Rajan MD  Staff Physician  Endocrinology and Metabolism  [facility]  License: [id number]  Pager: [phone]    Interval History as of 11/3/2023 : Patient has been doing well. Compliant to medication. She has been on victoza 1.8 mg for 3 years and only 10 lb lost initially, and currently plateau.   Interval History as of 12/2/2022 : Patient has been  doing well . Medication compliance excellent  .no complaint today.   Interval History as of 12/3/2021 : Patient has been doing well.. Medication compliance: well toleratign to victosa with full dose over 1 year, she lost 12 lb and rebounded and now total 10 lb loss   . No hirsutism, regular menses .  HPI: A 19 yo female here for the evaluation of her diabetes.   Patient was diagnosed diabetes 2 years ago with elevated A1c 6.5.  She has been trying diet and exercise.   This year she cut down significant amount of soda in which she limit amount of sweets and chocolate.  She rarely eat fast food.  Follow-up A1c in August 2020 was 6.6.  Never tried any medication or insulin.  She has family history of diabetes her older brother has diabetes diagnosed age 15, maternal grandfather has diabetes, paternal grandmother has diabetes, paternal uncle has diabetes.  Her body weight is 320 pounds and height 5 8, her body weight has been stable for the past few years.   She was also told elevated blood pressure but otherwise no medical history.  Her menarche was age 15-16 menstrual cycle regular taking birth control pill for the past 4 years.     Current Diabetic Regimens:  victoza 1.8 mg daily  Metformin 500 mg BID        Life Style: evening shift  Wake up 10 am  Breakfast  Lunch   Dinner  Bedtime    Exercise:  At work lifting patients    DM complications:  Retinopathy:   Nephropathy:   Neuropathy:   Most recent LDL:   LDL Cholesterol Calculated   Date Value Ref Range Status   03/03/2022 75 <=100 mg/dL Final   08/13/2020 106 (H) <100 mg/dL Final     Comment:     Above desirable:  100-129 mg/dl  Borderline High:  130-159 mg/dL  High:             160-189 mg/dL  Very high:       >189 mg/dl         Glucose Log: We downloaded glucometer and analyzed and summarize here.  : 110-120s througout the day    A1C trends from previous labs:   Hemoglobin A1C   Date Value Ref Range Status   12/08/2022 5.6 0.0 - 5.6 % Final     Comment:     Normal  <5.7%   Prediabetes 5.7-6.4%    Diabetes 6.5% or higher     Note: Adopted from ADA consensus guidelines.   06/13/2022 5.6 0.0 - 5.6 % Final     Comment:     Normal <5.7%   Prediabetes 5.7-6.4%    Diabetes 6.5% or higher     Note: Adopted from ADA consensus guidelines.   03/03/2022 5.4 0.0 - 5.6 % Final     Comment:     Normal <5.7%   Prediabetes 5.7-6.4%    Diabetes 6.5% or higher     Note: Adopted from ADA consensus guidelines.   12/02/2021 5.6 0.0 - 5.6 % Final     Comment:     Normal <5.7%   Prediabetes 5.7-6.4%    Diabetes 6.5% or higher     Note: Adopted from ADA consensus guidelines.   07/05/2021 5.3 0 - 5.6 % Final     Comment:     Normal <5.7% Prediabetes 5.7-6.4%  Diabetes 6.5% or higher - adopted from ADA   consensus guidelines.     03/31/2021 5.7 (H) 0 - 5.6 % Final     Comment:     Normal <5.7% Prediabetes 5.7-6.4%  Diabetes 6.5% or higher - adopted from ADA   consensus guidelines.     11/16/2020 5.6 0 - 5.6 % Final     Comment:     Normal <5.7% Prediabetes 5.7-6.4%  Diabetes 6.5% or higher - adopted from ADA   consensus guidelines.     08/13/2020 6.6 (H) 0 - 5.6 % Final     Comment:     Normal <5.7% Prediabetes 5.7-6.4%  Diabetes 6.5% or higher - adopted from ADA   consensus guidelines.     10/03/2018 6.5 (H) 0 - 5.6 % Final     Comment:     Normal <5.7% Prediabetes 5.7-6.4%  Diabetes 6.5% or higher - adopted from ADA   consensus guidelines.          Past Medical/Surgical History:  Past Medical History:   Diagnosis Date    Chronic kidney disease     Diabetes (H)     prediabetic     No past surgical history on file.    Allergies:  Allergies   Allergen Reactions    Nickel Dermatitis, Rash and Swelling       Current Medications   Current Outpatient Medications   Medication    blood glucose (NO BRAND SPECIFIED) lancets standard    blood glucose monitoring (NO BRAND SPECIFIED) meter device kit    Docosahexaenoic Acid (PRENATAL DHA) 200 MG capsule    hydrOXYzine (ATARAX) 25 MG tablet    liraglutide (VICTOZA  PEN) 18 MG/3ML solution    medroxyPROGESTERone (PROVERA) 10 MG tablet    metFORMIN (GLUCOPHAGE) 500 MG tablet    naproxen (NAPROSYN DR) 500 MG EC tablet    ONETOUCH ULTRA test strip    Probiotic Product (PROBIOTIC DAILY PO)     No current facility-administered medications for this visit.       Family History:  Family History   Problem Relation Age of Onset    Hypertension Mother     Glaucoma Mother     Diabetes Maternal Grandfather     Diabetes Paternal Grandmother     Hypertension Paternal Grandmother     Diabetes Brother     Glaucoma Brother     Heart Disease Father     Other Cancer Maternal Grandmother         ovarian    Cancer Maternal Grandmother     Glaucoma Maternal Grandmother     Macular Degeneration Maternal Grandmother        Social History:  Social History     Tobacco Use    Smoking status: Never     Passive exposure: Past    Smokeless tobacco: Never    Tobacco comments:     Dad smokes outside   Substance Use Topics    Alcohol use: Yes     Comment: occ   works full time assisted living.     ROS:  Full review of systems taken with the help of the intake sheet. Otherwise a complete 14 point review of systems was taken and is negative unless stated in the history above.      Physical Exam:   Vitals: There were no vitals taken for this visit.  BMI= There is no height or weight on file to calculate BMI.   General: well appearing, no acute distress, pleasant and conversant,   Mental Status/neuro: alert and oriented  Face: symmetrical, normal facial color  Eyes: anicteric, no proptosis or lid lag  Resp: no acute ditress      Labs : I reviewed data from epic and extract and summarize the pertinent data here.   Lab Results   Component Value Date     10/03/2018      Lab Results   Component Value Date    POTASSIUM 4.1 10/03/2018     Lab Results   Component Value Date    CHLORIDE 110 10/03/2018     Lab Results   Component Value Date    SEGUN 8.8 10/03/2018     Lab Results   Component Value Date    CO2 25  10/03/2018     Lab Results   Component Value Date    BUN 13 10/03/2018     Lab Results   Component Value Date    CR 0.55 10/03/2018     Lab Results   Component Value Date     08/13/2020     Lab Results   Component Value Date    TSH 1.09 08/13/2020     No results found for: T4  Lab Results   Component Value Date    A1C 6.6 08/13/2020       No results found for: IGF1  No results found for: LH  No results found for: FSH  No results found for: ESTROGEN  No results found for: PROLACTIN

## 2023-11-01 ENCOUNTER — TELEPHONE (OUTPATIENT)
Dept: ENDOCRINOLOGY | Facility: CLINIC | Age: 23
End: 2023-11-01
Payer: COMMERCIAL

## 2023-11-01 NOTE — TELEPHONE ENCOUNTER
Called patient and left voicemail. Patient has an appointment on 11/3/2023. Need to collect the last 14 days worth of blood sugar readings to prepare for patient's visit.     Lauren Dickinson LPN 11/01/23 2:08 PM

## 2023-11-02 ENCOUNTER — TRANSFERRED RECORDS (OUTPATIENT)
Dept: HEALTH INFORMATION MANAGEMENT | Facility: CLINIC | Age: 23
End: 2023-11-02
Payer: COMMERCIAL

## 2023-11-02 NOTE — TELEPHONE ENCOUNTER
Called patient and left voicemail. Patient has an appointment on 11/3/2023. Need to collect the last 14 days worth of blood sugar readings to prepare for patient's visit.     Lauren Dickinson LPN 11/02/23 9:42 AM

## 2023-11-03 ENCOUNTER — VIRTUAL VISIT (OUTPATIENT)
Dept: ENDOCRINOLOGY | Facility: CLINIC | Age: 23
End: 2023-11-03
Payer: COMMERCIAL

## 2023-11-03 DIAGNOSIS — E66.01 MORBID OBESITY (H): ICD-10-CM

## 2023-11-03 DIAGNOSIS — E11.9 TYPE 2 DIABETES MELLITUS WITHOUT COMPLICATION, WITHOUT LONG-TERM CURRENT USE OF INSULIN (H): Primary | ICD-10-CM

## 2023-11-03 PROCEDURE — 99214 OFFICE O/P EST MOD 30 MIN: CPT | Mod: 95 | Performed by: INTERNAL MEDICINE

## 2023-11-03 NOTE — LETTER
11/3/2023         RE: Bev Posadasra  [address] [place] [address]        Dear Colleague,    Thank you for referring your patient, Bev Araujo, to the [facility]. Please see a copy of my visit note below.    Outcome for 10/26/23 9:40 AM: deltamethod message sent  Lauren Dickinson LPN   Outcome for 11/01/23 2:08 PM: Left Voicemail   Lauren Dickinson LPN   Outcome for 11/02/23 9:42 AM: Left Voicemail   Lauren Dickinson LPN   Outcome for 11/03/23 2:24 PM: Data obtained via Dexcom website  Jennifer Bentley MA    Patient is showing 4/5 [place]CM met. BP out range   Lauren Dickinson LPN                        Video-Visit Details    Type of service:  Video Visit    Start: 2:38 pm   Stop: 2:55 pm     Originating Location (pt. Location): Home    Distant Location (provider location):  [facility]     Platform used for Video Visit: [facility]                                                                                 - Endocrinology follow up -    Reason for visit/consult: DM2, overweight    Primary care provider: Linnea Mars      Assessment and Plan  23 year old female with DM2, A1C 6.6 last year 2022, and overweight BMI 47    DM2  Reveiwed  CGM, excellent targeted range most of the time     - A1C     - continue  Metformin 500 mg BID      Overweight BMI47  She has been on victoza 1.8 mg for 3 years and only 10 lb lost initially, and currently plateau.     - discussed with her we will try switching to ozempic    Irregular menses  Persistated after stopped BCP and currently on progesterone withdrawal, managed by her PMD      DM complication  Urine microalbumin mildly + since 2018.       RTC with me in 1 year.       30  minutes spent on the date of the encounter doing chart review, history and exam, documentation and further activities as noted above.    Vy Rajan MD  Staff Physician  Endocrinology and Metabolism  [facility]  [facility]  License: [id number]  Pager: [phone]    Interval History as of 11/3/2023 : Patient has been doing well. Compliant to medication. She has been on victoza 1.8 mg for 3 years and only 10 lb lost initially, and currently plateau.   Interval History as of 12/2/2022 : Patient has been doing well . Medication compliance excellent  .no complaint today.   Interval History as of 12/3/2021 : Patient has been doing well.. Medication compliance: well toleratign to victosa with full dose over 1 year, she lost 12 lb and rebounded and now total 10 lb loss   . No hirsutism, regular menses .  HPI: A 21 yo female here for the evaluation of her diabetes.   Patient was diagnosed diabetes 2 years ago with elevated A1c 6.5.  She has been trying diet and exercise.   This year she cut down significant amount of soda in which she limit amount of sweets and chocolate.  She rarely eat fast food.  Follow-up A1c in August 2020 was 6.6.  Never tried any medication or insulin.  She has family history of diabetes her older brother has diabetes diagnosed age 15, maternal grandfather has diabetes, paternal grandmother has diabetes, paternal uncle has diabetes.  Her body weight is 320 pounds and height 5 8, her body weight has been stable for the past few years.   She was also told elevated blood pressure but otherwise no medical history.  Her menarche was age 13-16 menstrual cycle regular taking birth control pill for the past 4 years.     Current Diabetic Regimens:  victoza 1.8 mg daily  Metformin 500 mg BID        Life Style: evening shift  Wake up 10 am  Breakfast  Lunch   Dinner  Bedtime    Exercise:  At work lifting patients    DM complications:  Retinopathy:   Nephropathy:   Neuropathy:   Most recent LDL:   LDL Cholesterol Calculated   Date Value Ref Range Status   03/03/2022 75 <=100 mg/dL Final   08/13/2020 106 (H) <100 mg/dL Final     Comment:     Above desirable:  100-129 mg/dl  Borderline High:  130-159 mg/dL  High:              160-189 mg/dL  Very high:       >189 mg/dl         Glucose Log: We downloaded glucometer and analyzed and summarize here.  : 110-120s througout the day    A1C trends from previous labs:   Hemoglobin A1C   Date Value Ref Range Status   12/08/2022 5.6 0.0 - 5.6 % Final     Comment:     Normal <5.7%   Prediabetes 5.7-6.4%    Diabetes 6.5% or higher     Note: Adopted from ADA consensus guidelines.   06/13/2022 5.6 0.0 - 5.6 % Final     Comment:     Normal <5.7%   Prediabetes 5.7-6.4%    Diabetes 6.5% or higher     Note: Adopted from ADA consensus guidelines.   03/03/2022 5.4 0.0 - 5.6 % Final     Comment:     Normal <5.7%   Prediabetes 5.7-6.4%    Diabetes 6.5% or higher     Note: Adopted from ADA consensus guidelines.   12/02/2021 5.6 0.0 - 5.6 % Final     Comment:     Normal <5.7%   Prediabetes 5.7-6.4%    Diabetes 6.5% or higher     Note: Adopted from ADA consensus guidelines.   07/05/2021 5.3 0 - 5.6 % Final     Comment:     Normal <5.7% Prediabetes 5.7-6.4%  Diabetes 6.5% or higher - adopted from ADA   consensus guidelines.     03/31/2021 5.7 (H) 0 - 5.6 % Final     Comment:     Normal <5.7% Prediabetes 5.7-6.4%  Diabetes 6.5% or higher - adopted from ADA   consensus guidelines.     11/16/2020 5.6 0 - 5.6 % Final     Comment:     Normal <5.7% Prediabetes 5.7-6.4%  Diabetes 6.5% or higher - adopted from ADA   consensus guidelines.     08/13/2020 6.6 (H) 0 - 5.6 % Final     Comment:     Normal <5.7% Prediabetes 5.7-6.4%  Diabetes 6.5% or higher - adopted from ADA   consensus guidelines.     10/03/2018 6.5 (H) 0 - 5.6 % Final     Comment:     Normal <5.7% Prediabetes 5.7-6.4%  Diabetes 6.5% or higher - adopted from ADA   consensus guidelines.          Past Medical/Surgical History:  Past Medical History:   Diagnosis Date     Chronic kidney disease      Diabetes (H)     prediabetic     No past surgical history on file.    Allergies:  Allergies   Allergen Reactions     Nickel Dermatitis, Rash and Swelling       Current  Medications   Current Outpatient Medications   Medication     blood glucose (NO BRAND SPECIFIED) lancets standard     blood glucose monitoring (NO BRAND SPECIFIED) meter device kit     Docosahexaenoic Acid (PRENATAL DHA) 200 MG capsule     hydrOXYzine (ATARAX) 25 MG tablet     liraglutide (VICTOZA PEN) 18 MG/3ML solution     medroxyPROGESTERone (PROVERA) 10 MG tablet     metFORMIN (GLUCOPHAGE) 500 MG tablet     naproxen (NAPROSYN DR) 500 MG EC tablet     ONETOUCH ULTRA test strip     Probiotic Product (PROBIOTIC DAILY PO)     No current facility-administered medications for this visit.       Family History:  Family History   Problem Relation Age of Onset     Hypertension Mother      Glaucoma Mother      Diabetes Maternal Grandfather      Diabetes Paternal Grandmother      Hypertension Paternal Grandmother      Diabetes Brother      Glaucoma Brother      Heart Disease Father      Other Cancer Maternal Grandmother         ovarian     Cancer Maternal Grandmother      Glaucoma Maternal Grandmother      Macular Degeneration Maternal Grandmother        Social History:  Social History     Tobacco Use     Smoking status: Never     Passive exposure: Past     Smokeless tobacco: Never     Tobacco comments:     Dad smokes outside   Substance Use Topics     Alcohol use: Yes     Comment: occ   works full time assisted living.     ROS:  Full review of systems taken with the help of the intake sheet. Otherwise a complete 14 point review of systems was taken and is negative unless stated in the history above.      Physical Exam:   Vitals: There were no vitals taken for this visit.  BMI= There is no height or weight on file to calculate BMI.   General: well appearing, no acute distress, pleasant and conversant,   Mental Status/neuro: alert and oriented  Face: symmetrical, normal facial color  Eyes: anicteric, no proptosis or lid lag  Resp: no acute ditress      Labs : I reviewed data from epic and extract and summarize the pertinent  data here.   Lab Results   Component Value Date     10/03/2018      Lab Results   Component Value Date    POTASSIUM 4.1 10/03/2018     Lab Results   Component Value Date    CHLORIDE 110 10/03/2018     Lab Results   Component Value Date    SEGUN 8.8 10/03/2018     Lab Results   Component Value Date    CO2 25 10/03/2018     Lab Results   Component Value Date    BUN 13 10/03/2018     Lab Results   Component Value Date    CR 0.55 10/03/2018     Lab Results   Component Value Date     08/13/2020     Lab Results   Component Value Date    TSH 1.09 08/13/2020     No results found for: T4  Lab Results   Component Value Date    A1C 6.6 08/13/2020       No results found for: IGF1  No results found for: LH  No results found for: FSH  No results found for: ESTROGEN  No results found for: PROLACTIN              Again, thank you for allowing me to participate in the care of your patient.        Sincerely,        Vy Rajan MD

## 2023-11-03 NOTE — NURSING NOTE
Is the patient currently in the state of [place]? YES    Visit mode:VIDEO    If the visit is dropped, the patient can be reconnected by: VIDEO VISIT: Text to cell phone:   Telephone Information:   Mobile [phone]       Will anyone else be joining the visit? NO  (If patient encounters technical issues they should call [phone]    How would you like to obtain your AVS? MyChart    Are changes needed to the allergy or medication list? Pt stated no changes to allergies and Pt stated no med changes    Reason for visit: LINDA Bentley, LIBERTAD, VVF

## 2023-11-05 ENCOUNTER — HEALTH MAINTENANCE LETTER (OUTPATIENT)
Age: 23
End: 2023-11-05

## 2023-11-07 ENCOUNTER — OFFICE VISIT (OUTPATIENT)
Dept: URGENT CARE | Facility: URGENT CARE | Age: 23
End: 2023-11-07
Payer: COMMERCIAL

## 2023-11-07 VITALS
TEMPERATURE: 99.8 F | HEART RATE: 106 BPM | DIASTOLIC BLOOD PRESSURE: 116 MMHG | BODY MASS INDEX: 49.05 KG/M2 | WEIGHT: 293 LBS | SYSTOLIC BLOOD PRESSURE: 150 MMHG | OXYGEN SATURATION: 99 %

## 2023-11-07 DIAGNOSIS — H91.91 DECREASED HEARING OF RIGHT EAR: ICD-10-CM

## 2023-11-07 DIAGNOSIS — I16.0 HYPERTENSIVE URGENCY: Primary | ICD-10-CM

## 2023-11-07 DIAGNOSIS — H66.001 ACUTE SUPPURATIVE OTITIS MEDIA OF RIGHT EAR WITHOUT SPONTANEOUS RUPTURE OF TYMPANIC MEMBRANE, RECURRENCE NOT SPECIFIED: ICD-10-CM

## 2023-11-07 DIAGNOSIS — H92.01 MASTOID PAIN, RIGHT: ICD-10-CM

## 2023-11-07 PROCEDURE — 99214 OFFICE O/P EST MOD 30 MIN: CPT | Performed by: PHYSICIAN ASSISTANT

## 2023-11-07 ASSESSMENT — PAIN SCALES - GENERAL: PAINLEVEL: WORST PAIN (10)

## 2023-11-08 NOTE — PROGRESS NOTES
Chief Complaint   Patient presents with    Ear Problem     Recently did antibiotic Rt ear is having Pain, Discharge ,buzzing and heard time hearing out of ear                     ASSESSMENT:     ICD-10-CM    1. Hypertensive urgency  I16.0       2. Acute suppurative otitis media of right ear without spontaneous rupture of tympanic membrane, recurrence not specified  H66.001       3. Decreased hearing of right ear  H91.91       4. Mastoid pain, right  H92.01             PLAN: Severe right ear pain with decreased hearing and drainage today.  No obvious perforation noted. Treated for otitis media 10/4 with Augmentin.  Improved while on it.  My concern is that I feel she has right mastoid tenderness here tonight.  In addition has elevated blood pressure.  Denies any headache or dizziness.  To ER for evaluation and treatment.    I have discussed clinical findings with patient.  All questions are answered, patient indicates understanding of these issues and is in agreement with plan.   Patient care instructions are discussed/given at the end of visit.     Hiral Page PA-C      SUBJECTIVE:  23-year-old with severe right ear pain that started today.  Ear started to drain in the waiting room here.  Placed on Augmentin 10/4 for right otitis media.  Improved while on it.  Now reports lots of pain behind the right ear.  In addition extremely elevated  Blood pressure.  Denies headache or dizziness.  Chart review shows multiple elevated blood pressures in the past.  Not on any medication for it. No fever.    Allergies   Allergen Reactions    Nickel Dermatitis, Rash and Swelling       Past Medical History:   Diagnosis Date    Chronic kidney disease     Diabetes (H)     prediabetic       blood glucose (NO BRAND SPECIFIED) lancets standard, Use to test blood sugar 2 times daily or as directed.  blood glucose monitoring (NO BRAND SPECIFIED) meter device kit, Use to test blood sugar 2 times daily or as  directed.  Docosahexaenoic Acid (PRENATAL DHA) 200 MG capsule,   hydrOXYzine (ATARAX) 25 MG tablet, Take 25 mg by mouth every 4 hours as needed   liraglutide (VICTOZA PEN) 18 MG/3ML solution, ADMINISTER 1.8 MG UNDER THE SKIN DAILY  medroxyPROGESTERone (PROVERA) 10 MG tablet, Take 1 tab daily up to 10 days to trigger a menses each month prn if your UPT is negative  metFORMIN (GLUCOPHAGE) 500 MG tablet, Take 1 tablet (500 mg) by mouth 2 times daily (with meals)  naproxen (NAPROSYN DR) 500 MG EC tablet, Take 500 mg by mouth 2 times daily as needed (pain)  ONETOUCH ULTRA test strip, USE TO TEST BLOOD SUGARS TWICE DAILY OR AS DIRECTED  Probiotic Product (PROBIOTIC DAILY PO),   semaglutide (OZEMPIC) 2 MG/3ML pen, Inject 0.25 mg Subcutaneous every 7 days for 21 days, THEN 0.5 mg every 7 days for 21 days.    No current facility-administered medications on file prior to visit.      Social History     Tobacco Use    Smoking status: Never     Passive exposure: Past    Smokeless tobacco: Never    Tobacco comments:     Dad smokes outside   Substance Use Topics    Alcohol use: Yes     Comment: occ       ROS:  CONSTITUTIONAL: Negative for fatigue or fever.  EYES: Negative for eye problems.  ENT: As above.  RESP: Neg for SOB.  CV: Negative for chest pains.  GI: Negative for vomiting.  MUSCULOSKELETAL:  Negative for significant muscle or joint pains.  NEUROLOGIC: Negative for headaches.  SKIN: Negative for rash.  PSYCH: Normal mentation for age.    OBJECTIVE:  BP (!) 150/116   Pulse 106   Temp 99.8  F (37.7  C) (Tympanic)   Wt 147.4 kg (325 lb)   SpO2 99%   BMI 49.05 kg/m    GENERAL APPEARANCE: Healthy, alert and no distress.  EYES:Conjunctiva/sclera clear.  EARS: Left TM translucent.  Right tragal tenderness present.  Also feel there is some right mastoid tenderness.  Right TM is dull and mildly erythematous.  No obvious perforation noted.  Canal does not look red and swollen but there is a small amount of yellow drainage on  the floor of the canal.  THROAT: No erythema w/o tonsillar enlargement . No exudates.  NECK: Supple, nontender, no lymphadenopathy.  NEURO: Awake, alert    SKIN: No rashes        Hiral Page PA-C

## 2023-11-08 NOTE — PATIENT INSTRUCTIONS
Severe right ear pain.  Had Augmentin 10/4 for 10 days for right ear infection.  Improved on it.  However today severe pain with drainage in the waiting room.  No obvious perforation noted on exam but has tragal and mastoid tenderness.  In addition blood pressure elevated at 179/139.  To ER for further evaluation and treatment.

## 2023-12-11 ENCOUNTER — MYC REFILL (OUTPATIENT)
Dept: ENDOCRINOLOGY | Facility: CLINIC | Age: 23
End: 2023-12-11
Payer: COMMERCIAL

## 2023-12-11 DIAGNOSIS — E11.9 TYPE 2 DIABETES MELLITUS WITHOUT COMPLICATION, UNSPECIFIED WHETHER LONG TERM INSULIN USE (H): ICD-10-CM

## 2023-12-27 ENCOUNTER — MYC REFILL (OUTPATIENT)
Dept: PHARMACY | Facility: CLINIC | Age: 23
End: 2023-12-27
Payer: COMMERCIAL

## 2023-12-27 DIAGNOSIS — E11.9 TYPE 2 DIABETES MELLITUS WITHOUT COMPLICATION, WITHOUT LONG-TERM CURRENT USE OF INSULIN (H): ICD-10-CM

## 2023-12-27 DIAGNOSIS — E66.01 MORBID OBESITY (H): ICD-10-CM

## 2024-01-04 ENCOUNTER — TRANSFERRED RECORDS (OUTPATIENT)
Dept: HEALTH INFORMATION MANAGEMENT | Facility: CLINIC | Age: 24
End: 2024-01-04
Payer: COMMERCIAL

## 2024-01-14 ENCOUNTER — HEALTH MAINTENANCE LETTER (OUTPATIENT)
Age: 24
End: 2024-01-14

## 2024-01-25 ENCOUNTER — OFFICE VISIT (OUTPATIENT)
Dept: URGENT CARE | Facility: URGENT CARE | Age: 24
End: 2024-01-25
Payer: COMMERCIAL

## 2024-01-25 VITALS
WEIGHT: 293 LBS | DIASTOLIC BLOOD PRESSURE: 80 MMHG | TEMPERATURE: 97.8 F | OXYGEN SATURATION: 97 % | BODY MASS INDEX: 48.6 KG/M2 | RESPIRATION RATE: 16 BRPM | HEART RATE: 92 BPM | SYSTOLIC BLOOD PRESSURE: 140 MMHG

## 2024-01-25 DIAGNOSIS — R09.89 RUNNY NOSE: ICD-10-CM

## 2024-01-25 DIAGNOSIS — H53.142 PHOTOPHOBIA OF LEFT EYE: ICD-10-CM

## 2024-01-25 DIAGNOSIS — H57.12 PAIN IN PERIORBITAL REGION OF LEFT EYE: Primary | ICD-10-CM

## 2024-01-25 PROCEDURE — 99215 OFFICE O/P EST HI 40 MIN: CPT | Performed by: PHYSICIAN ASSISTANT

## 2024-01-25 ASSESSMENT — ENCOUNTER SYMPTOMS
COUGH: 0
EYE PAIN: 1
PALPITATIONS: 0
ARTHRALGIAS: 0
NECK STIFFNESS: 0
HEADACHES: 0
WEAKNESS: 0
ENDOCRINE NEGATIVE: 1
VOMITING: 0
ALLERGIC/IMMUNOLOGIC NEGATIVE: 1
RHINORRHEA: 0
SHORTNESS OF BREATH: 0
CARDIOVASCULAR NEGATIVE: 1
MYALGIAS: 0
FEVER: 0
EYE ITCHING: 0
PHOTOPHOBIA: 1
DIZZINESS: 0
EYE REDNESS: 1
RESPIRATORY NEGATIVE: 1
EYE DISCHARGE: 1
JOINT SWELLING: 0
DIARRHEA: 0
MUSCULOSKELETAL NEGATIVE: 1
CHILLS: 0
SORE THROAT: 0
NAUSEA: 0
LIGHT-HEADEDNESS: 0
WOUND: 0
NECK PAIN: 0
BACK PAIN: 0

## 2024-01-25 ASSESSMENT — PAIN SCALES - GENERAL: PAINLEVEL: EXTREME PAIN (8)

## 2024-01-25 NOTE — PROGRESS NOTES
"Chief Complaint:      Chief Complaint   Patient presents with    Eye Problem     Pain around left eye and \"in eye socket\" for the past 24 hours. Patient states her eye is watering. Patient states she has been taking Tylenol for pain and Mucinex to dry eye out. Patient states it does not feel like anything in eye. Patient denies vision changes or blurry vision.       Medical Decision Making:    Differential Diagnosis:  Eye Problem: Conjunctivitis  Preseptal cellulitis  Periorbital cellulitis  Cluster headache     ASSESSMENT     1. Pain in periorbital region of left eye    2. Runny nose    3. Photophobia of left eye         PLAN    Patient appears uncomfortable in clinic today.  She is afebrile with stable vital signs.   With swelling and periorbital pain, concern for early periorbital cellulitis.  Discussed case with ADS and ADS is at capacity today.    With unilateral runny nose and eye watering, patient placed on O2 at 16 LPM in clinic for possible Cluster headache.  After 20 minutes, patient verbalized significant improvement in symptoms.    Patient will continue to monitor this.  She understands that she is to go to the ED if symptoms worsen.  Patient verbalized understanding and agreed with this plan.    52 minutes was spent in the care of this patient including chart review, HPI, ROS, PE, review of plan, and placing of orders.      Labs:    No results found for any visits on 01/25/24.       Current Meds    Current Outpatient Medications:     blood glucose (NO BRAND SPECIFIED) lancets standard, Use to test blood sugar 2 times daily or as directed., Disp: 100 each, Rfl: 1    blood glucose monitoring (NO BRAND SPECIFIED) meter device kit, Use to test blood sugar 2 times daily or as directed., Disp: 1 kit, Rfl: 0    Docosahexaenoic Acid (PRENATAL DHA) 200 MG capsule, , Disp: , Rfl:     hydrOXYzine (ATARAX) 25 MG tablet, Take 25 mg by mouth every 4 hours as needed , Disp: , Rfl:     medroxyPROGESTERone (PROVERA) 10 " MG tablet, Take 1 tab daily up to 10 days to trigger a menses each month prn if your UPT is negative, Disp: 30 tablet, Rfl: 3    metFORMIN (GLUCOPHAGE) 500 MG tablet, Take 1 tablet (500 mg) by mouth 2 times daily (with meals), Disp: 180 tablet, Rfl: 3    naproxen (NAPROSYN DR) 500 MG EC tablet, Take 500 mg by mouth 2 times daily as needed (pain), Disp: 60 tablet, Rfl: 0    ONETOUCH ULTRA test strip, USE TO TEST BLOOD SUGARS TWICE DAILY OR AS DIRECTED, Disp: 100 strip, Rfl: 11    Probiotic Product (PROBIOTIC DAILY PO), , Disp: , Rfl:     Semaglutide, 1 MG/DOSE, (OZEMPIC) 4 MG/3ML pen, Inject 1 mg Subcutaneous every 7 days, Disp: 3 mL, Rfl: 11    Allergies  Allergies   Allergen Reactions    Nickel Dermatitis, Rash and Swelling         SUBJECTIVE    HPI: Bev Araujo is a 23 year old female presenting with left eye discharge, edema, pain since yesterday morning. She reports periorbital pain that feels similar to a headache, as well as redness, swelling, and excessive tearing. She endorses photophobia but denies phonophobia. No visual disturbances. She also reports left sided rhinorrhea beginning at the same time. There has not been exposure to pink eye.  There has not been trauma to the eye.  No fevers or chills. Bev Araujo does not wear contact lenses.  No recent viral illness or seasonal allergies.    ROS:     Review of Systems   Constitutional:  Negative for chills and fever.   HENT:  Negative for congestion, ear pain, rhinorrhea and sore throat.    Eyes:  Positive for photophobia, pain, discharge and redness. Negative for itching and visual disturbance.   Respiratory: Negative.  Negative for cough and shortness of breath.    Cardiovascular: Negative.  Negative for chest pain and palpitations.   Gastrointestinal:  Negative for diarrhea, nausea and vomiting.   Endocrine: Negative.    Genitourinary: Negative.    Musculoskeletal: Negative.  Negative for arthralgias, back pain,  joint swelling, myalgias, neck pain and neck stiffness.   Skin: Negative.  Negative for rash and wound.   Allergic/Immunologic: Negative.  Negative for immunocompromised state.   Neurological:  Negative for dizziness, weakness, light-headedness and headaches.           Family History   Family History   Problem Relation Age of Onset    Hypertension Mother     Glaucoma Mother     Diabetes Maternal Grandfather     Diabetes Paternal Grandmother     Hypertension Paternal Grandmother     Diabetes Brother     Glaucoma Brother     Heart Disease Father     Other Cancer Maternal Grandmother         ovarian    Cancer Maternal Grandmother     Glaucoma Maternal Grandmother     Macular Degeneration Maternal Grandmother         Problem history  Patient Active Problem List   Diagnosis    Morbid obesity (H)    Attention deficit disorder    Right-sided low back pain without sciatica, unspecified chronicity    Elevated BP without diagnosis of hypertension    Elevated fasting blood sugar    Type 2 diabetes mellitus without complication, with long-term current use of insulin (H)    Morbid obesity with BMI of 45.0-49.9, adult (H)           Social History  Social History     Socioeconomic History    Marital status:      Spouse name: Not on file    Number of children: Not on file    Years of education: Not on file    Highest education level: Not on file   Occupational History    Not on file   Tobacco Use    Smoking status: Never     Passive exposure: Past    Smokeless tobacco: Never    Tobacco comments:     Dad smokes outside   Vaping Use    Vaping Use: Never used   Substance and Sexual Activity    Alcohol use: Yes     Comment: occ    Drug use: No    Sexual activity: Yes     Partners: Male     Comment: never   Other Topics Concern    Parent/sibling w/ CABG, MI or angioplasty before 65F 55M? Not Asked   Social History Narrative    Not on file     Social Determinants of Health     Financial Resource Strain: Not on file   Food  Insecurity: Not on file   Transportation Needs: Not on file   Physical Activity: Not on file   Stress: Not on file   Social Connections: Not on file   Interpersonal Safety: Not on file   Housing Stability: Not on file        OBJECTIVE     Vital signs reviewed by Alessandro Mckay PA-C  BP (!) 140/80 (BP Location: Left arm, Patient Position: Sitting, Cuff Size: Adult Large)   Pulse 92   Temp 97.8  F (36.6  C) (Tympanic)   Resp 16   Wt 146.1 kg (322 lb)   SpO2 97%   BMI 48.60 kg/m       Physical Exam  Vitals and nursing note reviewed.   Constitutional:       General: She is not in acute distress.     Appearance: She is well-developed. She is not ill-appearing, toxic-appearing or diaphoretic.   HENT:      Head: Normocephalic and atraumatic.      Right Ear: External ear normal.      Left Ear: External ear normal.      Nose: No mucosal edema, congestion or rhinorrhea.      Right Sinus: No maxillary sinus tenderness or frontal sinus tenderness.      Left Sinus: No maxillary sinus tenderness or frontal sinus tenderness.      Mouth/Throat:      Pharynx: No pharyngeal swelling, oropharyngeal exudate, posterior oropharyngeal erythema or uvula swelling.      Tonsils: No tonsillar abscesses.   Eyes:      General: Gaze aligned appropriately. No scleral icterus.        Left eye: No foreign body, discharge or hordeolum.      Extraocular Movements:      Right eye: Normal extraocular motion and no nystagmus.      Left eye: Normal extraocular motion and no nystagmus.      Conjunctiva/sclera:      Right eye: Right conjunctiva is not injected.      Left eye: Left conjunctiva is not injected. No chemosis, exudate or hemorrhage.     Pupils: Pupils are equal, round, and reactive to light. Pupils are equal.      Comments: Dull pain with EOM on left.   Neck:      Trachea: Trachea normal.   Cardiovascular:      Rate and Rhythm: Normal rate and regular rhythm.      Heart sounds: S1 normal and S2 normal.   Pulmonary:      Effort:  Pulmonary effort is normal. No respiratory distress.      Breath sounds: No decreased breath sounds.   Abdominal:      General: There is no distension.      Palpations: Abdomen is soft. Abdomen is not rigid.   Musculoskeletal:      Cervical back: Full passive range of motion without pain, normal range of motion and neck supple.   Lymphadenopathy:      Cervical: No cervical adenopathy.   Skin:     General: Skin is warm and dry.   Neurological:      Mental Status: She is alert and oriented to person, place, and time.      Cranial Nerves: No cranial nerve deficit.   Psychiatric:         Behavior: Behavior normal. Behavior is cooperative.         Thought Content: Thought content normal.         Judgment: Judgment normal.            Alessandro Mckay PA-C  1/25/2024, 1:28 PM

## 2024-01-30 ENCOUNTER — LAB (OUTPATIENT)
Dept: LAB | Facility: CLINIC | Age: 24
End: 2024-01-30
Payer: COMMERCIAL

## 2024-01-30 ENCOUNTER — OFFICE VISIT (OUTPATIENT)
Dept: OPTOMETRY | Facility: CLINIC | Age: 24
End: 2024-01-30
Payer: COMMERCIAL

## 2024-01-30 DIAGNOSIS — H52.13 MYOPIA OF BOTH EYES: ICD-10-CM

## 2024-01-30 DIAGNOSIS — Z83.511 FAMILY HISTORY OF GLAUCOMA: ICD-10-CM

## 2024-01-30 DIAGNOSIS — Z79.4 TYPE 2 DIABETES MELLITUS WITHOUT COMPLICATION, WITH LONG-TERM CURRENT USE OF INSULIN (H): ICD-10-CM

## 2024-01-30 DIAGNOSIS — E11.9 TYPE 2 DIABETES MELLITUS WITHOUT COMPLICATION, WITHOUT LONG-TERM CURRENT USE OF INSULIN (H): ICD-10-CM

## 2024-01-30 DIAGNOSIS — H52.223 REGULAR ASTIGMATISM OF BOTH EYES: ICD-10-CM

## 2024-01-30 DIAGNOSIS — Z01.00 EXAMINATION OF EYES AND VISION: Primary | ICD-10-CM

## 2024-01-30 DIAGNOSIS — E11.9 TYPE 2 DIABETES MELLITUS WITHOUT COMPLICATION, WITH LONG-TERM CURRENT USE OF INSULIN (H): ICD-10-CM

## 2024-01-30 LAB
ALBUMIN SERPL BCG-MCNC: 3.9 G/DL (ref 3.5–5.2)
ALP SERPL-CCNC: 58 U/L (ref 40–150)
ALT SERPL W P-5'-P-CCNC: 63 U/L (ref 0–50)
ANION GAP SERPL CALCULATED.3IONS-SCNC: 11 MMOL/L (ref 7–15)
AST SERPL W P-5'-P-CCNC: 38 U/L (ref 0–45)
BILIRUB SERPL-MCNC: 0.3 MG/DL
BUN SERPL-MCNC: 8.9 MG/DL (ref 6–20)
CALCIUM SERPL-MCNC: 9 MG/DL (ref 8.6–10)
CHLORIDE SERPL-SCNC: 103 MMOL/L (ref 98–107)
CHOLEST SERPL-MCNC: 166 MG/DL
CREAT SERPL-MCNC: 0.51 MG/DL (ref 0.51–0.95)
CREAT UR-MCNC: 76.8 MG/DL
DEPRECATED HCO3 PLAS-SCNC: 23 MMOL/L (ref 22–29)
EGFRCR SERPLBLD CKD-EPI 2021: >90 ML/MIN/1.73M2
FASTING STATUS PATIENT QL REPORTED: YES
GLUCOSE SERPL-MCNC: 97 MG/DL (ref 70–99)
HBA1C MFR BLD: 5.8 % (ref 0–5.6)
HDLC SERPL-MCNC: 45 MG/DL
LDLC SERPL CALC-MCNC: 95 MG/DL
MICROALBUMIN UR-MCNC: 98.3 MG/L
MICROALBUMIN/CREAT UR: 127.99 MG/G CR (ref 0–25)
NONHDLC SERPL-MCNC: 121 MG/DL
POTASSIUM SERPL-SCNC: 4.4 MMOL/L (ref 3.4–5.3)
PROT SERPL-MCNC: 6.9 G/DL (ref 6.4–8.3)
SODIUM SERPL-SCNC: 137 MMOL/L (ref 135–145)
TRIGL SERPL-MCNC: 132 MG/DL

## 2024-01-30 PROCEDURE — 92014 COMPRE OPH EXAM EST PT 1/>: CPT | Performed by: OPTOMETRIST

## 2024-01-30 PROCEDURE — 82043 UR ALBUMIN QUANTITATIVE: CPT

## 2024-01-30 PROCEDURE — 92310 CONTACT LENS FITTING OU: CPT | Mod: GA | Performed by: OPTOMETRIST

## 2024-01-30 PROCEDURE — 82570 ASSAY OF URINE CREATININE: CPT

## 2024-01-30 PROCEDURE — 80061 LIPID PANEL: CPT

## 2024-01-30 PROCEDURE — 36415 COLL VENOUS BLD VENIPUNCTURE: CPT

## 2024-01-30 PROCEDURE — 83036 HEMOGLOBIN GLYCOSYLATED A1C: CPT

## 2024-01-30 PROCEDURE — 80053 COMPREHEN METABOLIC PANEL: CPT

## 2024-01-30 PROCEDURE — 92015 DETERMINE REFRACTIVE STATE: CPT | Performed by: OPTOMETRIST

## 2024-01-30 ASSESSMENT — CUP TO DISC RATIO
OD_RATIO: 0.4
OS_RATIO: 0.3

## 2024-01-30 ASSESSMENT — REFRACTION_WEARINGRX
OD_AXIS: 092
OS_AXIS: 084
OD_CYLINDER: +1.75
SPECS_TYPE: SVL
OD_SPHERE: -5.25
OS_CYLINDER: +1.25
OS_SPHERE: -5.75

## 2024-01-30 ASSESSMENT — CONF VISUAL FIELD
OS_SUPERIOR_NASAL_RESTRICTION: 0
OS_SUPERIOR_TEMPORAL_RESTRICTION: 0
OS_INFERIOR_TEMPORAL_RESTRICTION: 0
OS_NORMAL: 1
OD_SUPERIOR_NASAL_RESTRICTION: 0
OD_INFERIOR_NASAL_RESTRICTION: 0
OD_NORMAL: 1
OD_INFERIOR_TEMPORAL_RESTRICTION: 0
OS_INFERIOR_NASAL_RESTRICTION: 0
OD_SUPERIOR_TEMPORAL_RESTRICTION: 0

## 2024-01-30 ASSESSMENT — REFRACTION_MANIFEST
OD_AXIS: 092
OS_SPHERE: -6.00
OS_CYLINDER: +1.50
OD_SPHERE: -5.25
OS_AXIS: 084
OD_CYLINDER: +1.75

## 2024-01-30 ASSESSMENT — EXTERNAL EXAM - LEFT EYE: OS_EXAM: NORMAL

## 2024-01-30 ASSESSMENT — TONOMETRY
IOP_METHOD: TONOPEN
OD_IOP_MMHG: 19
OS_IOP_MMHG: 20

## 2024-01-30 ASSESSMENT — VISUAL ACUITY
OD_CC+: -1
CORRECTION_TYPE: GLASSES
OD_CC: 20/20
OS_CC: 20/20
OS_CC: 20/20
OS_SC: 20/400-
METHOD: SNELLEN - LINEAR
OD_CC: 20/20
OD_SC: 20/400-

## 2024-01-30 ASSESSMENT — SLIT LAMP EXAM - LIDS
COMMENTS: NORMAL
COMMENTS: NORMAL

## 2024-01-30 ASSESSMENT — KERATOMETRY
OS_K2POWER_DIOPTERS: 42.50
OD_K2POWER_DIOPTERS: 42.50
OS_AXISANGLE2_DEGREES: 174
OD_K1POWER_DIOPTERS: 40.25
OS_AXISANGLE_DEGREES: 084
OD_AXISANGLE2_DEGREES: 180
OD_AXISANGLE_DEGREES: 090
OS_K1POWER_DIOPTERS: 40.50

## 2024-01-30 ASSESSMENT — REFRACTION_CURRENTRX
OD_SPHERE: -3.50
OD_AXIS: 180
OD_CYLINDER: -1.75
OS_BRAND: ALCON DAILIES TOTAL 1 FOR ASTIGMATISM BC 8.6 D 14.5
OS_AXIS: 170
OD_BRAND: ALCON DAILIES TOTAL 1 FOR ASTIGMATISM BC 8.6 D 14.5
OS_SPHERE: -4.25
OS_CYLINDER: -1.25

## 2024-01-30 ASSESSMENT — EXTERNAL EXAM - RIGHT EYE: OD_EXAM: NORMAL

## 2024-01-30 NOTE — LETTER
1/30/2024         RE: Bev Araujo  [address] [place] [address]        Dear Colleague,    Thank you for referring your patient, Bev Araujo, to the [facility] [place]. Please see a copy of my visit note below.    Chief Complaint   Patient presents with     Diabetic Eye Exam        Chief Complaint(s) and History of Present Illness(es)       Diabetic Eye Exam              Vision: is stable    Diabetes Type: Type 2 and taking oral medications    Duration: 3 years    Blood Sugars: is controlled              Comments    More contacts fit fee $75 ok                  Lab Results   Component Value Date    A1C 5.8 01/30/2024    A1C 5.6 12/08/2022    A1C 5.6 06/13/2022    A1C 5.4 03/03/2022    A1C 5.6 12/02/2021    A1C 5.3 07/05/2021    A1C 5.7 03/31/2021    A1C 5.6 11/16/2020    A1C 6.6 08/13/2020    A1C 6.5 10/03/2018       Previous contact lens wearer? Yes,air optix  Comfort of contact lenses :good  Satisfied with current lenses: Yes        Last Eye Exam: 4-  Dilated Previously: Yes    What are you currently using to see?  glasses and contacts    Distance Vision Acuity: Satisfied with vision    Near Vision Acuity: Satisfied with vision while reading  unaided    Eye Comfort: good  Do you use eye drops? : No  Occupation or Hobbies:  - [employer]-     History of cluster headache last week that affected her left eye.  No issues since then.  Was seen in Urgent Care.    Family history of glaucoma- mother    Kelin Levin Optometric Assistant, A.B.O.C.     Medical, surgical and family histories reviewed and updated 1/30/2024.       OBJECTIVE: See Ophthalmology exam    ASSESSMENT:    ICD-10-CM    1. Examination of eyes and vision  Z01.00 EYE EXAM (SIMPLE-NONBILLABLE)      2. Type 2 diabetes mellitus without complication, with long-term current use of insulin (H)  E11.9 EYE EXAM (SIMPLE-NONBILLABLE)    Z79.4     Negative diabetic  retinopathy both eyes      3. Family history of glaucoma  Z83.511 EYE EXAM (SIMPLE-NONBILLABLE)      4. Myopia of both eyes  H52.13 REFRACTION     CONTACT LENS FITTING,BILAT w/ signed waiver      5. Regular astigmatism of both eyes  H52.223 REFRACTION     CONTACT LENS FITTING,BILAT w/ signed waiver          PLAN:    Bev Araujo aware  eye exam results will be sent to Linnea Mars.  Patient Instructions   There are not any signs of the diabetes affecting the eyes today.  It is important that you get your eyes dilated once yearly and keep good control of your diabetes.    Eyeglass prescription given.    Trial contact lenses will be ordered for .  Schedule a recheck about 1 week after picking up lenses.  Be sure to wear lenses to that appointment.    Recommend annual eye exams.    Willie Chang, OD             Again, thank you for allowing me to participate in the care of your patient.        Sincerely,        Willie Chang, OD

## 2024-01-30 NOTE — PATIENT INSTRUCTIONS
There are not any signs of the diabetes affecting the eyes today.  It is important that you get your eyes dilated once yearly and keep good control of your diabetes.    Eyeglass prescription given.    Trial contact lenses will be ordered for .  Schedule a recheck about 1 week after picking up lenses.  Be sure to wear lenses to that appointment.    Recommend annual eye exams.    Willie Chang, OD    The affects of the dilating drops last for 4- 6 hours.  You will be more sensitive to light and vision will be blurry up close.  Do not drive if you do not feel comfortable.  Mydriatic sunglasses were given if needed.    Patient Education   Diabetes weakens the blood vessels all over the body, including the eyes. Damage to the blood vessels in the eyes can cause swelling or bleeding into part of the eye (called the retina). This is called diabetic retinopathy (LEAH-tin--pu-thee). If not treated, this disease can cause vision loss or blindness.   Symptoms may include blurred or distorted vision, but many people have no symptoms. It's important to see your eye doctor regularly to check for problems.   Early treatment and good control can help protect your vision. Here are the things you can do to help prevent vision loss:      1. Keep your blood sugar levels under tight control.      2. Bring high blood pressure under control.      3. No smoking.      4. Have yearly dilated eye exams.       Optometry Providers       Clinic Locations                                 Telephone Number   Dr. Kirti Cortez   [facility]  [place] [phone]     [facility] Hours:                [facility] [place] [facility] Hours:       [facility] Hours:   [address] [facility] [address]   [address]     [address] [place] [address]   [place] [address]    LEAH Cortez  [address]  Phone: [phone]                    Phone: [phone]     Phone: [phone]                      Monday 8:00-6:00                          Monday 8:00-6:00                          Monday 8:00-6:00              Tuesday 8:00-6:00                          Tuesday 8:00-6:00                          Tuesday 8:00-6:00              Wednesday 8:00-6:00                  Wednesday 8:00-6:00                   Wednesday 8:00-6:00      Thursday 8:00-6:00                        Thursday 8:00-6:00                         Thursday 8:00-6:00            Friday 8:00-5:00                              Friday 8:00-5:00                              Friday 8:00-5:00    [facility] Hours:   [address] [place] [address]  [phone]    Monday 9:00-6:00  Tuesday 9:00-6:00  Wednesday 9:00-6:00  Thursday 9:00-6:00  Friday 9:00-5:00  As always, Thank you for trusting us with your health care needs!

## 2024-01-30 NOTE — PROGRESS NOTES
Chief Complaint   Patient presents with    Diabetic Eye Exam        Chief Complaint(s) and History of Present Illness(es)       Diabetic Eye Exam              Vision: is stable    Diabetes Type: Type 2 and taking oral medications    Duration: 3 years    Blood Sugars: is controlled              Comments    More contacts fit fee $75 ok                  Lab Results   Component Value Date    A1C 5.8 01/30/2024    A1C 5.6 12/08/2022    A1C 5.6 06/13/2022    A1C 5.4 03/03/2022    A1C 5.6 12/02/2021    A1C 5.3 07/05/2021    A1C 5.7 03/31/2021    A1C 5.6 11/16/2020    A1C 6.6 08/13/2020    A1C 6.5 10/03/2018       Previous contact lens wearer? Yes,air optix  Comfort of contact lenses :good  Satisfied with current lenses: Yes        Last Eye Exam: 4-  Dilated Previously: Yes    What are you currently using to see?  glasses and contacts    Distance Vision Acuity: Satisfied with vision    Near Vision Acuity: Satisfied with vision while reading  unaided    Eye Comfort: good  Do you use eye drops? : No  Occupation or Hobbies:  - [employer]-     History of cluster headache last week that affected her left eye.  No issues since then.  Was seen in Urgent Care.    Family history of glaucoma- mother    Kelin Levin Optometric Assistant, A.B.O.C.     Medical, surgical and family histories reviewed and updated 1/30/2024.       OBJECTIVE: See Ophthalmology exam    ASSESSMENT:    ICD-10-CM    1. Examination of eyes and vision  Z01.00 EYE EXAM (SIMPLE-NONBILLABLE)      2. Type 2 diabetes mellitus without complication, with long-term current use of insulin (H)  E11.9 EYE EXAM (SIMPLE-NONBILLABLE)    Z79.4     Negative diabetic retinopathy both eyes      3. Family history of glaucoma  Z83.511 EYE EXAM (SIMPLE-NONBILLABLE)      4. Myopia of both eyes  H52.13 REFRACTION     CONTACT LENS FITTING,BILAT w/ signed waiver      5. Regular astigmatism of both eyes  H52.223 REFRACTION     CONTACT LENS FITTING,BILAT w/  PACU signed waiver          PLAN:    Bev Araujo aware  eye exam results will be sent to Linnea Mars.  Patient Instructions   There are not any signs of the diabetes affecting the eyes today.  It is important that you get your eyes dilated once yearly and keep good control of your diabetes.    Eyeglass prescription given.    Trial contact lenses will be ordered for .  Schedule a recheck about 1 week after picking up lenses.  Be sure to wear lenses to that appointment.    Recommend annual eye exams.    Willie Chang, OD

## 2024-02-27 ENCOUNTER — ANCILLARY PROCEDURE (OUTPATIENT)
Dept: GENERAL RADIOLOGY | Facility: CLINIC | Age: 24
End: 2024-02-27
Attending: PHYSICIAN ASSISTANT
Payer: COMMERCIAL

## 2024-02-27 ENCOUNTER — OFFICE VISIT (OUTPATIENT)
Dept: URGENT CARE | Facility: URGENT CARE | Age: 24
End: 2024-02-27
Payer: COMMERCIAL

## 2024-02-27 VITALS
WEIGHT: 293 LBS | SYSTOLIC BLOOD PRESSURE: 155 MMHG | OXYGEN SATURATION: 96 % | RESPIRATION RATE: 20 BRPM | DIASTOLIC BLOOD PRESSURE: 116 MMHG | HEART RATE: 98 BPM | BODY MASS INDEX: 48.71 KG/M2 | TEMPERATURE: 99.6 F

## 2024-02-27 DIAGNOSIS — J18.9 PNEUMONIA OF BOTH LOWER LOBES DUE TO INFECTIOUS ORGANISM: Primary | ICD-10-CM

## 2024-02-27 PROCEDURE — 99214 OFFICE O/P EST MOD 30 MIN: CPT | Performed by: PHYSICIAN ASSISTANT

## 2024-02-27 PROCEDURE — 71046 X-RAY EXAM CHEST 2 VIEWS: CPT | Mod: TC | Performed by: RADIOLOGY

## 2024-02-27 RX ORDER — AZITHROMYCIN 250 MG/1
TABLET, FILM COATED ORAL
Qty: 6 TABLET | Refills: 0 | Status: SHIPPED | OUTPATIENT
Start: 2024-02-27

## 2024-02-27 RX ORDER — BENZONATATE 200 MG/1
200 CAPSULE ORAL 3 TIMES DAILY PRN
Qty: 30 CAPSULE | Refills: 0 | Status: SHIPPED | OUTPATIENT
Start: 2024-02-27 | End: 2024-03-08

## 2024-02-27 RX ORDER — ALBUTEROL SULFATE 90 UG/1
2 AEROSOL, METERED RESPIRATORY (INHALATION) EVERY 4 HOURS PRN
Qty: 18 G | Refills: 0 | Status: SHIPPED | OUTPATIENT
Start: 2024-02-27

## 2024-02-27 ASSESSMENT — ENCOUNTER SYMPTOMS
COUGH: 1
CARDIOVASCULAR NEGATIVE: 1
CHILLS: 0
SINUS PRESSURE: 0
WHEEZING: 1
SINUS PAIN: 0
NAUSEA: 0
VOMITING: 0
FATIGUE: 0
DIARRHEA: 0
SHORTNESS OF BREATH: 1
SORE THROAT: 0
PALPITATIONS: 0
RHINORRHEA: 1
FEVER: 1

## 2024-02-27 ASSESSMENT — PAIN SCALES - GENERAL: PAINLEVEL: EXTREME PAIN (8)

## 2024-02-27 NOTE — LETTER
February 27, 2024      Bev Araujo  [address] [place] [address]        To Whom It May Concern:    Bev Araujo was seen in urgent care clinic today and is unable to work from 2/27/24-3/1/24 due to her symptoms.  Please feel free to contact me via phone if you have any questions or concerns.        Sincerely,      Irina Connelly PA-C

## 2024-02-28 NOTE — PROGRESS NOTES
Subjective   Bev is a 23 year old, presenting for the following health issues:  Cough (Patient seen in clinic for a persistent cough for about the last 4 weeks. When she cough she has pain her upper chest area that goes around.)    HPI   Acute Illness  Acute illness concerns:   Onset/Duration: 4weeks  Symptoms:  Fever: YES  Chills/Sweats: No  Headache (location?): No  Sinus Pressure: No  Conjunctivitis:  No  Ear Pain: no  Rhinorrhea: YES  Congestion: YES  Sore Throat: No  Cough: YES-productive of yellow sputum, with shortness of breath  Wheeze: YES  Decreased Appetite: No  Nausea: No  Vomiting: No  Diarrhea: No  Dysuria/Freq.: No  Dysuria or Hematuria: No  Fatigue/Achiness: No  Sick/Strep Exposure: YES- at work  Therapies tried and outcome: rest,fluids,cough meds with minimal relief    Patient Active Problem List   Diagnosis    Morbid obesity (H)    Attention deficit disorder    Right-sided low back pain without sciatica, unspecified chronicity    Elevated BP without diagnosis of hypertension    Elevated fasting blood sugar    Type 2 diabetes mellitus without complication, with long-term current use of insulin (H)    Morbid obesity with BMI of 45.0-49.9, adult (H)     Current Outpatient Medications   Medication    blood glucose (NO BRAND SPECIFIED) lancets standard    blood glucose monitoring (NO BRAND SPECIFIED) meter device kit    Docosahexaenoic Acid (PRENATAL DHA) 200 MG capsule    hydrOXYzine (ATARAX) 25 MG tablet    medroxyPROGESTERone (PROVERA) 10 MG tablet    metFORMIN (GLUCOPHAGE) 500 MG tablet    naproxen (NAPROSYN DR) 500 MG EC tablet    ONETOUCH ULTRA test strip    Probiotic Product (PROBIOTIC DAILY PO)    Semaglutide, 1 MG/DOSE, (OZEMPIC) 4 MG/3ML pen     No current facility-administered medications for this visit.        Allergies   Allergen Reactions    Nickel Dermatitis, Rash and Swelling     Review of Systems   Constitutional:  Positive for fever. Negative for chills and fatigue.   HENT:   Positive for congestion and rhinorrhea. Negative for ear pain, sinus pressure, sinus pain and sore throat.    Respiratory:  Positive for cough, shortness of breath and wheezing.    Cardiovascular: Negative.  Negative for chest pain, palpitations and leg swelling.   Gastrointestinal:  Negative for diarrhea, nausea and vomiting.   All other systems reviewed and are negative.          Objective    BP (!) 155/116 (BP Location: Right arm, Patient Position: Sitting, Cuff Size: Adult Large)   Pulse 98   Temp 99.6  F (37.6  C) (Tympanic)   Resp 20   Wt 146.4 kg (322 lb 11.2 oz)   SpO2 96%   BMI 48.71 kg/m    Body mass index is 48.71 kg/m .  Physical Exam  Vitals and nursing note reviewed.   Constitutional:       General: She is not in acute distress.     Appearance: Normal appearance. She is well-developed and normal weight. She is not ill-appearing.   HENT:      Head: Normocephalic and atraumatic.      Comments: TMs are intact without any erythema or bulging bilaterally.  Airway is patent.     Nose: Nose normal.      Mouth/Throat:      Lips: Pink.      Mouth: Mucous membranes are moist.      Pharynx: Oropharynx is clear. Uvula midline. No pharyngeal swelling, oropharyngeal exudate or posterior oropharyngeal erythema.      Tonsils: No tonsillar exudate.   Eyes:      General: No scleral icterus.     Extraocular Movements: Extraocular movements intact.      Conjunctiva/sclera: Conjunctivae normal.      Pupils: Pupils are equal, round, and reactive to light.   Neck:      Thyroid: No thyromegaly.   Cardiovascular:      Rate and Rhythm: Normal rate and regular rhythm.      Pulses: Normal pulses.      Heart sounds: Normal heart sounds, S1 normal and S2 normal. No murmur heard.     No friction rub. No gallop.   Pulmonary:      Effort: Pulmonary effort is normal. No accessory muscle usage, respiratory distress or retractions.      Breath sounds: Normal breath sounds and air entry. No stridor. No decreased breath sounds,  wheezing, rhonchi or rales.   Musculoskeletal:      Cervical back: Normal range of motion and neck supple.   Lymphadenopathy:      Cervical: No cervical adenopathy.   Skin:     General: Skin is warm and dry.      Nails: There is no clubbing.   Neurological:      Mental Status: She is alert and oriented to person, place, and time.   Psychiatric:         Mood and Affect: Mood normal.         Behavior: Behavior normal.         Thought Content: Thought content normal.         Judgment: Judgment normal.        No results found for this or any previous visit (from the past 24 hour(s)).    CXR PA/lateral:  Suspicious bilateral lower lobe infiltrates.  No effusions or pneumothorax.  No suspicious nodules or lesions. No fractures.  Per my read.   Will send for overread.      Assessment/Plan:  Pneumonia of both lower lobes due to infectious organism:  CXR shows suspicious bilateral infiltrates.  Will treat with zithromax+augmentin, tessalon perles, and albuterol inh as needed for symptoms.  Recommend treatment with rest, fluids and chicken soup. Tylenol/ibuprofen prn fever/pain.  Recheck in clinic if symptoms worsen or if symptoms do not improve.  To the ER if she develops hemoptysis, chest pain, fevers>102, worsening shortness of breath/wheezing.    -     XR Chest 2 Views  -     azithromycin (ZITHROMAX) 250 MG tablet; 2 tablets the first day, then 1 tablet daily for the next 4 days  -     amoxicillin-clavulanate (AUGMENTIN) 875-125 MG tablet; Take 1 tablet by mouth 2 times daily for 10 days  -     albuterol (PROAIR HFA/PROVENTIL HFA/VENTOLIN HFA) 108 (90 Base) MCG/ACT inhaler; Inhale 2 puffs into the lungs every 4 hours as needed for shortness of breath, wheezing or cough Use with spacer  -     benzonatate (TESSALON) 200 MG capsule; Take 1 capsule (200 mg) by mouth 3 times daily as needed for cough        Irina See AMANDA Connelly

## 2024-04-18 DIAGNOSIS — J18.9 PNEUMONIA OF BOTH LOWER LOBES DUE TO INFECTIOUS ORGANISM: ICD-10-CM

## 2024-04-18 RX ORDER — ALBUTEROL SULFATE 90 UG/1
2 INHALANT RESPIRATORY (INHALATION) EVERY 4 HOURS PRN
Qty: 18 G | Refills: 0 | OUTPATIENT
Start: 2024-04-18

## 2024-04-18 NOTE — TELEPHONE ENCOUNTER
Medication Question or Refill    Contacts         Type Contact Phone/Fax    04/18/2024 10:24 AM CDT Fax (Incoming) [id number] - [place], [place] - [address] AT [facility] [address] (Pharmacy) [phone]            What medication are you calling about (include dose and sig)?: albuterol (PROAIR HFA/PROVENTIL HFA/VENTOLIN HFA) 108 (90 Base) MCG/ACT inhaler     Preferred Pharmacy:  [id number] - [place], [place] - [address] AT [facility] [address]      Controlled Substance Agreement on file:   CSA -- Patient Level:    CSA: None found at the patient level.       Who prescribed the medication?: UC    Do you need a refill? Yes    When did you use the medication last? N/a    Patient offered an appointment? No    Do you have any questions or concerns?  No      Could we send this information to you in [facility] or would you prefer to receive a phone call?:   Patient would prefer a phone call   Okay to leave a detailed message?: Yes at Cell number on file:    Telephone Information:   Mobile [phone]

## 2024-05-01 ENCOUNTER — TELEPHONE (OUTPATIENT)
Dept: ENDOCRINOLOGY | Facility: CLINIC | Age: 24
End: 2024-05-01
Payer: COMMERCIAL

## 2024-05-01 NOTE — TELEPHONE ENCOUNTER
Left Voicemail (1st Attempt) for the patient to call back and schedule the following:    Appointment type: return  Provider: dr. Rajan   Return date: 11/3/2024  Specialty phone number: [phone]   Additonal Notes:  Return in about 1 year (around 11/3/2024)     [facility]   Orthopedics, Podiatry, Sports Medicine, Ent ,Eye , Audiology, Adult Endocrine & Diabetes, Nutrition & Medication Therapy Management Specialties   [facility]

## 2024-05-02 ENCOUNTER — OFFICE VISIT (OUTPATIENT)
Dept: URGENT CARE | Facility: URGENT CARE | Age: 24
End: 2024-05-02
Payer: COMMERCIAL

## 2024-05-02 ENCOUNTER — ANCILLARY PROCEDURE (OUTPATIENT)
Dept: GENERAL RADIOLOGY | Facility: CLINIC | Age: 24
End: 2024-05-02
Attending: PHYSICIAN ASSISTANT
Payer: COMMERCIAL

## 2024-05-02 VITALS
SYSTOLIC BLOOD PRESSURE: 168 MMHG | DIASTOLIC BLOOD PRESSURE: 98 MMHG | OXYGEN SATURATION: 96 % | TEMPERATURE: 101.4 F | HEART RATE: 110 BPM | BODY MASS INDEX: 48.71 KG/M2 | WEIGHT: 293 LBS

## 2024-05-02 DIAGNOSIS — J20.9 ACUTE BRONCHITIS WITH COEXISTING CONDITION REQUIRING PROPHYLACTIC TREATMENT: ICD-10-CM

## 2024-05-02 DIAGNOSIS — J18.1 LOBAR PNEUMONIA (H): ICD-10-CM

## 2024-05-02 DIAGNOSIS — R03.0 ELEVATED BP WITHOUT DIAGNOSIS OF HYPERTENSION: ICD-10-CM

## 2024-05-02 DIAGNOSIS — J10.1 INFLUENZA B: ICD-10-CM

## 2024-05-02 DIAGNOSIS — Z79.4 TYPE 2 DIABETES MELLITUS WITHOUT COMPLICATION, WITH LONG-TERM CURRENT USE OF INSULIN (H): ICD-10-CM

## 2024-05-02 DIAGNOSIS — E11.9 TYPE 2 DIABETES MELLITUS WITHOUT COMPLICATION, WITH LONG-TERM CURRENT USE OF INSULIN (H): ICD-10-CM

## 2024-05-02 DIAGNOSIS — R07.0 THROAT PAIN: ICD-10-CM

## 2024-05-02 DIAGNOSIS — R07.0 THROAT PAIN: Primary | ICD-10-CM

## 2024-05-02 PROBLEM — R50.9 FEVER: Status: ACTIVE | Noted: 2024-05-02

## 2024-05-02 LAB
DEPRECATED S PYO AG THROAT QL EIA: NEGATIVE
FLUAV AG SPEC QL IA: NEGATIVE
FLUBV AG SPEC QL IA: POSITIVE
GROUP A STREP BY PCR: NOT DETECTED

## 2024-05-02 PROCEDURE — 87651 STREP A DNA AMP PROBE: CPT | Performed by: PHYSICIAN ASSISTANT

## 2024-05-02 PROCEDURE — 71046 X-RAY EXAM CHEST 2 VIEWS: CPT | Mod: TC | Performed by: RADIOLOGY

## 2024-05-02 PROCEDURE — 99214 OFFICE O/P EST MOD 30 MIN: CPT | Performed by: PHYSICIAN ASSISTANT

## 2024-05-02 PROCEDURE — 87804 INFLUENZA ASSAY W/OPTIC: CPT | Performed by: PHYSICIAN ASSISTANT

## 2024-05-02 RX ORDER — AZITHROMYCIN 250 MG/1
TABLET, FILM COATED ORAL
Qty: 6 TABLET | Refills: 0 | Status: SHIPPED | OUTPATIENT
Start: 2024-05-02

## 2024-05-02 RX ORDER — ACETAMINOPHEN 325 MG/1
TABLET ORAL
Qty: 2 TABLET | Refills: 0 | Status: SHIPPED | OUTPATIENT
Start: 2024-05-02

## 2024-05-02 RX ORDER — AZITHROMYCIN 250 MG/1
TABLET, FILM COATED ORAL
Qty: 6 TABLET | Refills: 0 | Status: SHIPPED | OUTPATIENT
Start: 2024-05-02 | End: 2024-05-02

## 2024-05-02 RX ORDER — OSELTAMIVIR PHOSPHATE 75 MG/1
75 CAPSULE ORAL 2 TIMES DAILY
Qty: 10 CAPSULE | Refills: 0 | Status: SHIPPED | OUTPATIENT
Start: 2024-05-02 | End: 2024-05-07

## 2024-05-02 RX ORDER — ACETAMINOPHEN 325 MG/1
650 TABLET ORAL ONCE
Status: COMPLETED | OUTPATIENT
Start: 2024-05-02 | End: 2024-05-02

## 2024-05-02 RX ADMIN — ACETAMINOPHEN 650 MG: 325 TABLET ORAL at 16:31

## 2024-05-02 NOTE — LETTER
May 2, 2024      Bev Araujo  [address] [place] [address]        To Whom It May Concern:    Please excuse her from until fever has been gone 24 hours.     Sincerely,        Nahun Delaney PA-C

## 2024-05-02 NOTE — PATIENT INSTRUCTIONS
Take all meds as prescribed, recommend checking back up with your doctor as planned for blood pressure and flu; recommend using your inhaler as prescribed

## 2024-05-02 NOTE — PROGRESS NOTES
HPI 23 yo with multiple complaints of morbidities present with fever onset today, continuous cough since monday after inhaling food particle into airway, when pressed is not clear on onset and duration of cough + hx pneumonia 2 months ago, says she gets very anxious at dr office, no NVD, als C/o left esr pain, uses       ROS no SHORT OF BREATH, no HEADACHE, no NVD, po's well tolerated ,denies hemoptysis, no calf pain, is on no meds for BP      Physical Exam  Vitals and nursing note reviewed.   Constitutional:       Appearance: Normal appearance.   HENT:      Head: Normocephalic and atraumatic.      Right Ear: Tympanic membrane and external ear normal.      Left Ear: Tympanic membrane and external ear normal.      Ears:      Comments: Mild tragal motion pain left ear, nl canal, mild ttp with rottion against canal with speculum > right side  Eyes:      General:         Right eye: No discharge.         Left eye: No discharge.      Conjunctiva/sclera: Conjunctivae normal.      Pupils: Pupils are equal, round, and reactive to light.   Neck:      Thyroid: No thyromegaly.      Trachea: No tracheal deviation.   Cardiovascular:      Rate and Rhythm: Regular rhythm. Tachycardia present.      Heart sounds: Normal heart sounds. No murmur heard.     Comments: fever  Pulmonary:      Effort: Pulmonary effort is normal.      Comments: Respiratory rate normal, no stridor, no respiratory distress,+  wheeze, no retractions, no rales, breath sounds present in all fields, nl bronchophony and fremitus, non-tender to palp, no calf ttp. No keli sign, no cording     Abdominal:      Palpations: Abdomen is soft.      Tenderness: There is no abdominal tenderness.   Musculoskeletal:         General: No tenderness or deformity.      Cervical back: Normal range of motion and neck supple.   Lymphadenopathy:      Cervical: No cervical adenopathy.   Skin:     General: Skin is warm.      Capillary Refill: Capillary refill takes less than 2 seconds.       Findings: No erythema or rash.   Neurological:      Mental Status: She is alert and oriented to person, place, and time.      Cranial Nerves: No cranial nerve deficit.      Motor: No abnormal muscle tone.   Psychiatric:         Behavior: Behavior normal.       Not best inhlation in lateral xray but appears to have increased nodular appearance, with recently hx pneumonia and DM will rx prophylacticly  --    RAD agress -- but JOK will discontinue and call in augmenting., spoke with pharmacist to discontinue zpak and dispense Augmentin  Spoke with Bev at 2020 -- she's got  the Augmentin, advised f/up pcp in 2 -4 days. She verbalizes understanding

## 2024-05-02 NOTE — NURSING NOTE
Clinic Administered Medication Documentation    Patient was given TYLENOL. Prior to medication administration, verified patient's identity using patient's name and date of birth.    Gris Turpin MA

## 2024-05-08 ENCOUNTER — TELEPHONE (OUTPATIENT)
Dept: FAMILY MEDICINE | Facility: CLINIC | Age: 24
End: 2024-05-08
Payer: COMMERCIAL

## 2024-06-02 ENCOUNTER — HEALTH MAINTENANCE LETTER (OUTPATIENT)
Age: 24
End: 2024-06-02

## 2024-06-24 ENCOUNTER — MYC REFILL (OUTPATIENT)
Dept: OBGYN | Facility: CLINIC | Age: 24
End: 2024-06-24
Payer: COMMERCIAL

## 2024-06-24 DIAGNOSIS — N91.2 AMENORRHEA: ICD-10-CM

## 2024-06-25 RX ORDER — MEDROXYPROGESTERONE ACETATE 10 MG
TABLET ORAL
Qty: 30 TABLET | Refills: 0 | Status: SHIPPED | OUTPATIENT
Start: 2024-06-25 | End: 2024-09-23

## 2024-06-25 NOTE — TELEPHONE ENCOUNTER
Requested Prescriptions   Pending Prescriptions Disp Refills    medroxyPROGESTERone (PROVERA) 10 MG tablet 30 tablet 3     Sig: Take 1 tab daily up to 10 days to trigger a menses each month prn if your UPT is negative       There is no refill protocol information for this order        Pt last seen 6/8/2023 for amenorrhea    Last prescribed 6/8/2023 for 30 tablets with 3 refills    Pt has appointment on 7/24/2024    RN routing to provider to advise on greg refill until appt.    Routing refill request to provider for review/approval because:  Drug not on the [facility] refill protocol     Klarissa Emmanuel, RN on 6/25/2024 at 7:53 AM

## 2024-06-26 ENCOUNTER — OFFICE VISIT (OUTPATIENT)
Dept: URGENT CARE | Facility: URGENT CARE | Age: 24
End: 2024-06-26
Payer: COMMERCIAL

## 2024-06-26 VITALS
BODY MASS INDEX: 48.24 KG/M2 | OXYGEN SATURATION: 99 % | WEIGHT: 293 LBS | SYSTOLIC BLOOD PRESSURE: 178 MMHG | TEMPERATURE: 98.7 F | HEART RATE: 91 BPM | RESPIRATION RATE: 18 BRPM | DIASTOLIC BLOOD PRESSURE: 130 MMHG

## 2024-06-26 DIAGNOSIS — E11.9 TYPE 2 DIABETES MELLITUS WITHOUT COMPLICATION, WITH LONG-TERM CURRENT USE OF INSULIN (H): ICD-10-CM

## 2024-06-26 DIAGNOSIS — Z79.4 TYPE 2 DIABETES MELLITUS WITHOUT COMPLICATION, WITH LONG-TERM CURRENT USE OF INSULIN (H): ICD-10-CM

## 2024-06-26 DIAGNOSIS — H66.002 ACUTE SUPPURATIVE OTITIS MEDIA OF LEFT EAR WITHOUT SPONTANEOUS RUPTURE OF TYMPANIC MEMBRANE, RECURRENCE NOT SPECIFIED: Primary | ICD-10-CM

## 2024-06-26 PROCEDURE — 99214 OFFICE O/P EST MOD 30 MIN: CPT | Performed by: PHYSICIAN ASSISTANT

## 2024-06-26 ASSESSMENT — ENCOUNTER SYMPTOMS
COUGH: 1
JOINT SWELLING: 0
WOUND: 0
ALLERGIC/IMMUNOLOGIC NEGATIVE: 1
HEADACHES: 0
MUSCULOSKELETAL NEGATIVE: 1
VOMITING: 0
WEAKNESS: 0
SHORTNESS OF BREATH: 0
NECK PAIN: 0
SORE THROAT: 0
DIZZINESS: 0
RHINORRHEA: 0
ARTHRALGIAS: 0
DIARRHEA: 0
CHILLS: 0
EYES NEGATIVE: 1
PALPITATIONS: 0
MYALGIAS: 0
NECK STIFFNESS: 0
FEVER: 0
CARDIOVASCULAR NEGATIVE: 1
BACK PAIN: 0
LIGHT-HEADEDNESS: 0
NAUSEA: 0
ENDOCRINE NEGATIVE: 1

## 2024-06-26 NOTE — PROGRESS NOTES
Chief Complaint:     Chief Complaint   Patient presents with    Ear Problem     Today left ear has started hurting and been clogged, hx of left ear infections     URI     Onset Saturday - cough, runny nose        No results found for any visits on 06/26/24.    Medical Decision Making:    Vital signs reviewed by Alessandro Mckay PA-C  BP (!) 178/130 (BP Location: Left arm, Patient Position: Sitting, Cuff Size: Adult Large)   Pulse 91   Temp 98.7  F (37.1  C) (Tympanic)   Resp 18   Wt 145 kg (319 lb 9.6 oz)   SpO2 99%   BMI 48.24 kg/m      Differential Diagnosis:  URI Adult/Peds:  Acute left otitis media, Bronchitis-viral, Influenza, Pneumonia, and Viral upper respiratory illness        ASSESSMENT    1. Type 2 diabetes mellitus without complication, with long-term current use of insulin (H)    2. Acute suppurative otitis media of left ear without spontaneous rupture of tympanic membrane, recurrence not specified        PLAN    Patient is in no acute distress.    Temp is 98.7 in clinic today, lung sounds were clear, and O2 sats at 99% on RA.    Rx for Augmentin for ear infection.  Rest, Push fluids, vaporizer, elevation of head of bed.  Ibuprofen and or Tylenol for any fever or body aches.  Over the counter cough suppressant- PRN- as discussed.   Patient at higher risk for severe infection with DM.  Patient instructed to monitor blood sugars closely with illness.  Continue taking your Metformin, and Ozempic and follow up with your primary provider for any DM medication changes if needed.  If symptoms worsen, recheck immediately otherwise follow up with your PCP in 1 week if symptoms are not improving.  Worrisome symptoms discussed with instructions to go to the ED.  Patient verbalized understanding and agreed with this plan.    Labs:    No results found for any visits on 06/26/24.     Vital signs reviewed by Alessandro Mckay PA-C  BP (!) 178/130 (BP Location: Left arm, Patient Position: Sitting, Cuff Size: Adult  Large)   Pulse 91   Temp 98.7  F (37.1  C) (Tympanic)   Resp 18   Wt 145 kg (319 lb 9.6 oz)   SpO2 99%   BMI 48.24 kg/m      Current Meds      Current Outpatient Medications:     acetaminophen (TYLENOL) 325 MG tablet, Please give to pt now in clinic ( 650 mg po) thanks !, Disp: 2 tablet, Rfl: 0    albuterol (PROAIR HFA/PROVENTIL HFA/VENTOLIN HFA) 108 (90 Base) MCG/ACT inhaler, Inhale 2 puffs into the lungs every 4 hours as needed for shortness of breath, wheezing or cough Use with spacer, Disp: 18 g, Rfl: 0    amoxicillin-clavulanate (AUGMENTIN) 875-125 MG tablet, Take 1 tablet by mouth 2 times daily for 10 days, Disp: 20 tablet, Rfl: 0    amoxicillin-clavulanate (AUGMENTIN) 875-125 MG tablet, Take 1 tablet by mouth 2 times daily, Disp: 20 tablet, Rfl: 0    azithromycin (ZITHROMAX) 250 MG tablet, Two tablets first day, then one tablet daily for four days., Disp: 6 tablet, Rfl: 0    azithromycin (ZITHROMAX) 250 MG tablet, 2 tablets the first day, then 1 tablet daily for the next 4 days, Disp: 6 tablet, Rfl: 0    blood glucose (NO BRAND SPECIFIED) lancets standard, Use to test blood sugar 2 times daily or as directed., Disp: 100 each, Rfl: 1    blood glucose monitoring (NO BRAND SPECIFIED) meter device kit, Use to test blood sugar 2 times daily or as directed., Disp: 1 kit, Rfl: 0    Docosahexaenoic Acid (PRENATAL DHA) 200 MG capsule, , Disp: , Rfl:     hydrOXYzine (ATARAX) 25 MG tablet, Take 25 mg by mouth every 4 hours as needed , Disp: , Rfl:     medroxyPROGESTERone (PROVERA) 10 MG tablet, Take 1 tab daily up to 10 days to trigger a menses each month prn if your UPT is negative, Disp: 30 tablet, Rfl: 0    metFORMIN (GLUCOPHAGE) 500 MG tablet, Take 1 tablet (500 mg) by mouth 2 times daily (with meals), Disp: 180 tablet, Rfl: 3    naproxen (NAPROSYN DR) 500 MG EC tablet, Take 500 mg by mouth 2 times daily as needed (pain), Disp: 60 tablet, Rfl: 0    ONETOUCH ULTRA test strip, USE TO TEST BLOOD SUGARS TWICE  DAILY OR AS DIRECTED, Disp: 100 strip, Rfl: 11    Probiotic Product (PROBIOTIC DAILY PO), , Disp: , Rfl:     Semaglutide, 1 MG/DOSE, (OZEMPIC) 4 MG/3ML pen, Inject 1 mg Subcutaneous every 7 days, Disp: 3 mL, Rfl: 11      Respiratory History    occasional episodes of bronchitis      SUBJECTIVE    HPI: Bev Araujo is an 24 year old female who presents with chest congestion, cough nonproductive, occasional, and ear pain left.  Symptoms began 3  days ago and has unchanged.  There is no shortness of breath, wheezing, and chest pain.  Patient is eating and drinking well.  No fever, nausea, vomiting, or diarrhea.    Patient denies any recent travel or exposure to known COVID positive tested individual.      ROS:     Review of Systems   Constitutional:  Negative for chills and fever.   HENT:  Positive for congestion and ear pain. Negative for rhinorrhea and sore throat.    Eyes: Negative.    Respiratory:  Positive for cough. Negative for shortness of breath.    Cardiovascular: Negative.  Negative for chest pain and palpitations.   Gastrointestinal:  Negative for diarrhea, nausea and vomiting.   Endocrine: Negative.    Genitourinary: Negative.    Musculoskeletal: Negative.  Negative for arthralgias, back pain, joint swelling, myalgias, neck pain and neck stiffness.   Skin: Negative.  Negative for rash and wound.   Allergic/Immunologic: Negative.  Negative for immunocompromised state.   Neurological:  Negative for dizziness, weakness, light-headedness and headaches.         Family History   Family History   Problem Relation Age of Onset    Diabetes Mother     Hypertension Mother     Glaucoma Mother     Heart Disease Father     Other Cancer Maternal Grandmother         ovarian    Cancer Maternal Grandmother     Glaucoma Maternal Grandmother     Macular Degeneration Maternal Grandmother     Diabetes Maternal Grandfather     Diabetes Paternal Grandmother     Hypertension Paternal Grandmother     Diabetes  Brother     Glaucoma Brother         Problem history  Patient Active Problem List   Diagnosis    Morbid obesity (H)    Attention deficit disorder    Right-sided low back pain without sciatica, unspecified chronicity    Elevated BP without diagnosis of hypertension    Elevated fasting blood sugar    Type 2 diabetes mellitus without complication, with long-term current use of insulin (H)    Morbid obesity with BMI of 45.0-49.9, adult (H)    Fever        Allergies  Allergies   Allergen Reactions    Nickel Dermatitis, Rash and Swelling        Social History  Social History     Socioeconomic History    Marital status:      Spouse name: Not on file    Number of children: Not on file    Years of education: Not on file    Highest education level: Not on file   Occupational History    Not on file   Tobacco Use    Smoking status: Never     Passive exposure: Past    Smokeless tobacco: Never    Tobacco comments:     Dad smokes outside   Vaping Use    Vaping status: Never Used   Substance and Sexual Activity    Alcohol use: Yes     Comment: occ    Drug use: No    Sexual activity: Yes     Partners: Male     Comment: never   Other Topics Concern    Parent/sibling w/ CABG, MI or angioplasty before 65F 55M? Not Asked   Social History Narrative    Not on file     Social Determinants of Health     Financial Resource Strain: Not on file   Food Insecurity: Not on file   Transportation Needs: Not on file   Physical Activity: Not on file   Stress: Not on file   Social Connections: Not on file   Interpersonal Safety: Not At Risk (5/4/2024)    Received from [facility]     Humiliation, Afraid, Rape, and Kick questionnaire     Fear of Current or Ex-Partner: No     Emotionally Abused: No     Physically Abused: No     Sexually Abused: No   Housing Stability: Not on file        OBJECTIVE     Vital signs reviewed by Alessandro Mckay PA-C  BP (!) 178/130 (BP Location: Left arm, Patient Position: Sitting, Cuff Size: Adult Large)    Pulse 91   Temp 98.7  F (37.1  C) (Tympanic)   Resp 18   Wt 145 kg (319 lb 9.6 oz)   SpO2 99%   BMI 48.24 kg/m       Physical Exam  Vitals and nursing note reviewed.   Constitutional:       General: She is not in acute distress.     Appearance: She is well-developed. She is not ill-appearing, toxic-appearing or diaphoretic.   HENT:      Head: Normocephalic and atraumatic.      Right Ear: Hearing, tympanic membrane, ear canal and external ear normal. No drainage, swelling or tenderness. Tympanic membrane is not perforated, erythematous, retracted or bulging.      Left Ear: Hearing, ear canal and external ear normal. No drainage, swelling or tenderness. Tympanic membrane is bulging. Tympanic membrane is not perforated, erythematous or retracted.      Nose: Congestion present. No mucosal edema or rhinorrhea.      Right Sinus: No maxillary sinus tenderness or frontal sinus tenderness.      Left Sinus: No maxillary sinus tenderness or frontal sinus tenderness.      Mouth/Throat:      Pharynx: No pharyngeal swelling, oropharyngeal exudate, posterior oropharyngeal erythema or uvula swelling.      Tonsils: No tonsillar exudate or tonsillar abscesses. 0 on the right. 0 on the left.   Eyes:      General:         Right eye: No discharge.         Left eye: No discharge.      Pupils: Pupils are equal, round, and reactive to light.   Cardiovascular:      Rate and Rhythm: Normal rate and regular rhythm.      Heart sounds: Normal heart sounds. No murmur heard.     No friction rub. No gallop.   Pulmonary:      Effort: Pulmonary effort is normal. No respiratory distress.      Breath sounds: Normal breath sounds. No decreased breath sounds, wheezing, rhonchi or rales.   Chest:      Chest wall: No tenderness.   Abdominal:      General: Bowel sounds are normal. There is no distension.      Palpations: Abdomen is soft. There is no mass.      Tenderness: There is no abdominal tenderness. There is no guarding.   Musculoskeletal:       Cervical back: Normal range of motion and neck supple.   Lymphadenopathy:      Head:      Right side of head: No submental, submandibular, tonsillar, preauricular or posterior auricular adenopathy.      Left side of head: No submental, submandibular, tonsillar, preauricular or posterior auricular adenopathy.      Cervical: No cervical adenopathy.      Right cervical: No superficial or posterior cervical adenopathy.     Left cervical: No superficial or posterior cervical adenopathy.   Skin:     General: Skin is warm and dry.      Findings: No rash.   Neurological:      Mental Status: She is alert and oriented to person, place, and time.      Cranial Nerves: No cranial nerve deficit.      Deep Tendon Reflexes: Reflexes are normal and symmetric.   Psychiatric:         Behavior: Behavior normal. Behavior is cooperative.         Thought Content: Thought content normal.         Judgment: Judgment normal.           Alessandro Mckay PA-C  6/26/2024, 6:34 PM

## 2024-08-27 NOTE — PROGRESS NOTES
Outcome for 08/27/24 2:30 PM: [employer] message sent  Lauren Dickinson LPN   Outcome for 09/04/24 2:09 PM:  Pt using [employer] , needs instructions.   Bobbi Grossaint, VF   Outcome for 09/04/24 2:13 PM: Device upload instructions sent to patient via [employer]  Lauren Dickinson LPN   Outcome for 09/05/24 10:40 AM: Left Voicemail   Christa Grossaint, VF  Outcome for 09/06/24 8:08 AM: Data obtained via [employer] website  Lauren Dickinson LPN       Patient is showing 4/5 [place] met. BP out range   Lauren Dickinson LPN                Video-Visit Details    Type of service:  Video Visit    Start: 2:38 pm   Stop: 2:55 pm     Originating Location (pt. Location): Home    Distant Location (provider location):  [facility]     Platform used for Video Visit: [facility]                                                                                 - Endocrinology follow up -    Reason for visit/consult: DM2, overweight    Primary care provider: Linnea Mars      Assessment and Plan  23 year old female with DM2, A1C 6.6 last year 2022, and overweight BMI 47    DM2  Reveiwed  CGM, excellent targeted range most of the time, compliant to metformin     - A1C order placed    - continue  current Metformin 500 mg BID      Overweight BMI47  She was on victoza 1.8 mg for 3 years and only 10 lb lost initially, then switched to ozempic, currently 1 mg weekly, no changes weight, she is tolerating GLP1, we will try to increase the dose    - try ozempic 2 mg weekly injection, if not change, then we need to refer to to weight management clinic    Irregular menses  Persistated after stopped BCP and currently on progesterone withdrawal, managed by her PMD        RTC with me in 1 year.       30  minutes spent on the date of the encounter doing chart review, history and exam, documentation and further activities as noted above.    Vy Rajan MD  Staff Physician  Endocrinology and Metabolism  [facility]  License: [id number]  Pager:  [phone]    Interval History as of 9/6/2024 : Patient has been doing well.  Medication compliance excellent to metformin  . New event includes  no change BW with ozempic 1 mg weekly.   Interval History as of 11/3/2023 : Patient has been doing well. Compliant to medication. She has been on victoza 1.8 mg for 3 years and only 10 lb lost initially, and currently plateau.   Interval History as of 12/2/2022 : Patient has been doing well . Medication compliance excellent  .no complaint today.   Interval History as of 12/3/2021 : Patient has been doing well.. Medication compliance: well toleratign to victosa with full dose over 1 year, she lost 12 lb and rebounded and now total 10 lb loss   . No hirsutism, regular menses .  HPI: A 21 yo female here for the evaluation of her diabetes.   Patient was diagnosed diabetes 2 years ago with elevated A1c 6.5.  She has been trying diet and exercise.   This year she cut down significant amount of soda in which she limit amount of sweets and chocolate.  She rarely eat fast food.  Follow-up A1c in August 2020 was 6.6.  Never tried any medication or insulin.  She has family history of diabetes her older brother has diabetes diagnosed age 15, maternal grandfather has diabetes, paternal grandmother has diabetes, paternal uncle has diabetes.  Her body weight is 320 pounds and height 5 8, her body weight has been stable for the past few years.   She was also told elevated blood pressure but otherwise no medical history.  Her menarche was age 15-16 menstrual cycle regular taking birth control pill for the past 4 years.     Current Diabetic Regimens:  victoza 1.8 mg daily  Metformin 500 mg BID        Life Style: evening shift  Wake up 10 am  Breakfast  Lunch   Dinner  Bedtime    Exercise:  At work lifting patients    DM complications:  Retinopathy:   Nephropathy:   Neuropathy:   Most recent LDL:   LDL Cholesterol Calculated   Date Value Ref Range Status   01/30/2024 95 <=100 mg/dL Final    08/13/2020 106 (H) <100 mg/dL Final     Comment:     Above desirable:  100-129 mg/dl  Borderline High:  130-159 mg/dL  High:             160-189 mg/dL  Very high:       >189 mg/dl         Glucose Log: We downloaded glucometer and analyzed and summarize here.  : 110-120s througout the day    A1C trends from previous labs:   Hemoglobin A1C   Date Value Ref Range Status   01/30/2024 5.8 (H) 0.0 - 5.6 % Final     Comment:     Normal <5.7%   Prediabetes 5.7-6.4%    Diabetes 6.5% or higher     Note: Adopted from ADA consensus guidelines.   12/08/2022 5.6 0.0 - 5.6 % Final     Comment:     Normal <5.7%   Prediabetes 5.7-6.4%    Diabetes 6.5% or higher     Note: Adopted from ADA consensus guidelines.   06/13/2022 5.6 0.0 - 5.6 % Final     Comment:     Normal <5.7%   Prediabetes 5.7-6.4%    Diabetes 6.5% or higher     Note: Adopted from ADA consensus guidelines.   03/03/2022 5.4 0.0 - 5.6 % Final     Comment:     Normal <5.7%   Prediabetes 5.7-6.4%    Diabetes 6.5% or higher     Note: Adopted from ADA consensus guidelines.   12/02/2021 5.6 0.0 - 5.6 % Final     Comment:     Normal <5.7%   Prediabetes 5.7-6.4%    Diabetes 6.5% or higher     Note: Adopted from ADA consensus guidelines.   07/05/2021 5.3 0 - 5.6 % Final     Comment:     Normal <5.7% Prediabetes 5.7-6.4%  Diabetes 6.5% or higher - adopted from ADA   consensus guidelines.     03/31/2021 5.7 (H) 0 - 5.6 % Final     Comment:     Normal <5.7% Prediabetes 5.7-6.4%  Diabetes 6.5% or higher - adopted from ADA   consensus guidelines.     11/16/2020 5.6 0 - 5.6 % Final     Comment:     Normal <5.7% Prediabetes 5.7-6.4%  Diabetes 6.5% or higher - adopted from ADA   consensus guidelines.     08/13/2020 6.6 (H) 0 - 5.6 % Final     Comment:     Normal <5.7% Prediabetes 5.7-6.4%  Diabetes 6.5% or higher - adopted from ADA   consensus guidelines.     10/03/2018 6.5 (H) 0 - 5.6 % Final     Comment:     Normal <5.7% Prediabetes 5.7-6.4%  Diabetes 6.5% or higher - adopted from  ADA   consensus guidelines.          Past Medical/Surgical History:  Past Medical History:   Diagnosis Date    Chronic kidney disease     Diabetes (H)     prediabetic     No past surgical history on file.    Allergies:  Allergies   Allergen Reactions    Nickel Dermatitis, Rash and Swelling       Current Medications   Current Outpatient Medications   Medication Sig Dispense Refill    acetaminophen (TYLENOL) 325 MG tablet Please give to pt now in clinic ( 650 mg po) thanks ! 2 tablet 0    albuterol (PROAIR HFA/PROVENTIL HFA/VENTOLIN HFA) 108 (90 Base) MCG/ACT inhaler Inhale 2 puffs into the lungs every 4 hours as needed for shortness of breath, wheezing or cough Use with spacer 18 g 0    amoxicillin-clavulanate (AUGMENTIN) 875-125 MG tablet Take 1 tablet by mouth 2 times daily 20 tablet 0    azithromycin (ZITHROMAX) 250 MG tablet Two tablets first day, then one tablet daily for four days. 6 tablet 0    azithromycin (ZITHROMAX) 250 MG tablet 2 tablets the first day, then 1 tablet daily for the next 4 days 6 tablet 0    blood glucose (NO BRAND SPECIFIED) lancets standard Use to test blood sugar 2 times daily or as directed. 100 each 1    blood glucose monitoring (NO BRAND SPECIFIED) meter device kit Use to test blood sugar 2 times daily or as directed. 1 kit 0    Docosahexaenoic Acid (PRENATAL DHA) 200 MG capsule       hydrOXYzine (ATARAX) 25 MG tablet Take 25 mg by mouth every 4 hours as needed       medroxyPROGESTERone (PROVERA) 10 MG tablet Take 1 tab daily up to 10 days to trigger a menses each month prn if your UPT is negative 30 tablet 0    metFORMIN (GLUCOPHAGE) 500 MG tablet Take 1 tablet (500 mg) by mouth 2 times daily (with meals) 180 tablet 3    naproxen (NAPROSYN DR) 500 MG EC tablet Take 500 mg by mouth 2 times daily as needed (pain) 60 tablet 0    ONETOUCH ULTRA test strip USE TO TEST BLOOD SUGARS TWICE DAILY OR AS DIRECTED 100 strip 11    Probiotic Product (PROBIOTIC DAILY PO)       Semaglutide, 1  MG/DOSE, (OZEMPIC) 4 MG/3ML pen Inject 1 mg Subcutaneous every 7 days 3 mL 11     No current facility-administered medications for this visit.       Family History:  Family History   Problem Relation Age of Onset    Diabetes Mother     Hypertension Mother     Glaucoma Mother     Heart Disease Father     Other Cancer Maternal Grandmother         ovarian    Cancer Maternal Grandmother     Glaucoma Maternal Grandmother     Macular Degeneration Maternal Grandmother     Diabetes Maternal Grandfather     Diabetes Paternal Grandmother     Hypertension Paternal Grandmother     Diabetes Brother     Glaucoma Brother        Social History:  Social History     Tobacco Use    Smoking status: Never     Passive exposure: Past    Smokeless tobacco: Never    Tobacco comments:     Dad smokes outside   Substance Use Topics    Alcohol use: Yes     Comment: occ   works full time assisted living.     ROS:  Full review of systems taken with the help of the intake sheet. Otherwise a complete 14 point review of systems was taken and is negative unless stated in the history above.      Physical Exam:   Vitals: There were no vitals taken for this visit.  BMI= There is no height or weight on file to calculate BMI.   General: well appearing, no acute distress, pleasant and conversant,   Mental Status/neuro: alert and oriented  Face: symmetrical, normal facial color  Eyes: anicteric, no proptosis or lid lag  Resp: no acute ditress      Labs : I reviewed data from epic and extract and summarize the pertinent data here.   Lab Results   Component Value Date     10/03/2018      Lab Results   Component Value Date    POTASSIUM 4.1 10/03/2018     Lab Results   Component Value Date    CHLORIDE 110 10/03/2018     Lab Results   Component Value Date    SEGUN 8.8 10/03/2018     Lab Results   Component Value Date    CO2 25 10/03/2018     Lab Results   Component Value Date    BUN 13 10/03/2018     Lab Results   Component Value Date    CR 0.55 10/03/2018      Lab Results   Component Value Date     08/13/2020     Lab Results   Component Value Date    TSH 1.09 08/13/2020     No results found for: T4  Lab Results   Component Value Date    A1C 6.6 08/13/2020       No results found for: IGF1  No results found for: LH  No results found for: FSH  No results found for: ESTROGEN  No results found for: PROLACTIN

## 2024-09-04 ENCOUNTER — TELEPHONE (OUTPATIENT)
Dept: ENDOCRINOLOGY | Facility: CLINIC | Age: 24
End: 2024-09-04
Payer: COMMERCIAL

## 2024-09-04 NOTE — TELEPHONE ENCOUNTER
Called patient and left voicemail. Patient has an appointment on 9/6/24. Need patient to upload their Dexcom device to site for provider to review prior to their appointment.    Lauren Dickinson LPN 09/04/24 2:07 PM

## 2024-09-05 NOTE — TELEPHONE ENCOUNTER
Called patient and left voicemail. Patient has an appointment on  9/6/24 . Need patient to upload their Desmond device to site for provider to review prior to their appointment.  Bobbi Grossaint, VF

## 2024-09-06 ENCOUNTER — VIRTUAL VISIT (OUTPATIENT)
Dept: ENDOCRINOLOGY | Facility: CLINIC | Age: 24
End: 2024-09-06
Payer: COMMERCIAL

## 2024-09-06 DIAGNOSIS — E66.9 MODERATE OBESITY: ICD-10-CM

## 2024-09-06 DIAGNOSIS — E11.9 TYPE 2 DIABETES MELLITUS WITHOUT COMPLICATION, WITHOUT LONG-TERM CURRENT USE OF INSULIN (H): Primary | ICD-10-CM

## 2024-09-06 PROCEDURE — 99214 OFFICE O/P EST MOD 30 MIN: CPT | Mod: 95 | Performed by: INTERNAL MEDICINE

## 2024-09-06 RX ORDER — SEMAGLUTIDE 2.68 MG/ML
2 INJECTION, SOLUTION SUBCUTANEOUS
Qty: 9 ML | Refills: 3 | Status: SHIPPED | OUTPATIENT
Start: 2024-09-06 | End: 2024-09-09

## 2024-09-06 NOTE — NURSING NOTE
Current patient location: [place]    Is the patient currently in the state of [place]? YES    Visit mode:VIDEO    If the visit is dropped, the patient can be reconnected by: VIDEO VISIT: Text to cell phone:   Telephone Information:   Mobile [phone]       Will anyone else be joining the visit? NO  (If patient encounters technical issues they should call [phone]    How would you like to obtain your AVS? MyChart    Are changes needed to the allergy or medication list? No, Pt stated no changes to allergies, and Pt stated no med changes      Are refills needed on medications prescribed by this physician? Unknown    Rooming Documentation:  Not applicable      Reason for visit: RECHECK    Verna MILLER

## 2024-09-06 NOTE — LETTER
9/6/2024      Bev Valeriorera  [address] [place] [address]      Dear Colleague,    Thank you for referring your patient, Bev Araujo, to the [facility]. Please see a copy of my visit note below.    Outcome for 08/27/24 2:30 PM: [employer] message sent  Lauren Dickinson LPN   Outcome for 09/04/24 2:09 PM:  Pt using [employer] , needs instructions.   Bobbi Grossaint, VF   Outcome for 09/04/24 2:13 PM: Device upload instructions sent to patient via [employer]  Lauren Dickinson LPN   Outcome for 09/05/24 10:40 AM: Left Voicemail   Bobbi Grossaint, VF  Outcome for 09/06/24 8:08 AM: Data obtained via [employer] website  Lauren Dickinson LPN       Patient is showing 4/5 MNCM met. BP out range   Lauren Dickinson LPN                Video-Visit Details    Type of service:  Video Visit    Start: 2:38 pm   Stop: 2:55 pm     Originating Location (pt. Location): Home    Distant Location (provider location):  [facility]     Platform used for Video Visit: [facility]                                                                                 - Endocrinology follow up -    Reason for visit/consult: DM2, overweight    Primary care provider: Linnea Mars      Assessment and Plan  23 year old female with DM2, A1C 6.6 last year 2022, and overweight BMI 47    DM2  Reveiwed  CGM, excellent targeted range most of the time, compliant to metformin     - A1C order placed    - continue  current Metformin 500 mg BID      Overweight BMI47  She was on victoza 1.8 mg for 3 years and only 10 lb lost initially, then switched to ozempic, currently 1 mg weekly, no changes weight, she is tolerating GLP1, we will try to increase the dose    - try ozempic 2 mg weekly injection, if not change, then we need to refer to to weight management clinic    Irregular menses  Persistated after stopped BCP and currently on progesterone withdrawal, managed by her PMD        RTC with me in 1 year.        30  minutes spent on the date of the encounter doing chart review, history and exam, documentation and further activities as noted above.    Vy Rajan MD  Staff Physician  Endocrinology and Metabolism  [facility]  License: [id number]  Pager: [phone]    Interval History as of 9/6/2024 : Patient has been doing well.  Medication compliance excellent to metformin  . New event includes  no change BW with ozempic 1 mg weekly.   Interval History as of 11/3/2023 : Patient has been doing well. Compliant to medication. She has been on victoza 1.8 mg for 3 years and only 10 lb lost initially, and currently plateau.   Interval History as of 12/2/2022 : Patient has been doing well . Medication compliance excellent  .no complaint today.   Interval History as of 12/3/2021 : Patient has been doing well.. Medication compliance: well toleratign to victosa with full dose over 1 year, she lost 12 lb and rebounded and now total 10 lb loss   . No hirsutism, regular menses .  HPI: A 21 yo female here for the evaluation of her diabetes.   Patient was diagnosed diabetes 2 years ago with elevated A1c 6.5.  She has been trying diet and exercise.   This year she cut down significant amount of soda in which she limit amount of sweets and chocolate.  She rarely eat fast food.  Follow-up A1c in August 2020 was 6.6.  Never tried any medication or insulin.  She has family history of diabetes her older brother has diabetes diagnosed age 15, maternal grandfather has diabetes, paternal grandmother has diabetes, paternal uncle has diabetes.  Her body weight is 320 pounds and height 5 8, her body weight has been stable for the past few years.   She was also told elevated blood pressure but otherwise no medical history.  Her menarche was age 13-16 menstrual cycle regular taking birth control pill for the past 4 years.     Current Diabetic Regimens:  victoza 1.8 mg daily  Metformin 500 mg BID        Life Style: evening  shift  Wake up 10 am  Breakfast  Lunch   Dinner  Bedtime    Exercise:  At work lifting patients    DM complications:  Retinopathy:   Nephropathy:   Neuropathy:   Most recent LDL:   LDL Cholesterol Calculated   Date Value Ref Range Status   01/30/2024 95 <=100 mg/dL Final   08/13/2020 106 (H) <100 mg/dL Final     Comment:     Above desirable:  100-129 mg/dl  Borderline High:  130-159 mg/dL  High:             160-189 mg/dL  Very high:       >189 mg/dl         Glucose Log: We downloaded glucometer and analyzed and summarize here.  : 110-120s througout the day    A1C trends from previous labs:   Hemoglobin A1C   Date Value Ref Range Status   01/30/2024 5.8 (H) 0.0 - 5.6 % Final     Comment:     Normal <5.7%   Prediabetes 5.7-6.4%    Diabetes 6.5% or higher     Note: Adopted from ADA consensus guidelines.   12/08/2022 5.6 0.0 - 5.6 % Final     Comment:     Normal <5.7%   Prediabetes 5.7-6.4%    Diabetes 6.5% or higher     Note: Adopted from ADA consensus guidelines.   06/13/2022 5.6 0.0 - 5.6 % Final     Comment:     Normal <5.7%   Prediabetes 5.7-6.4%    Diabetes 6.5% or higher     Note: Adopted from ADA consensus guidelines.   03/03/2022 5.4 0.0 - 5.6 % Final     Comment:     Normal <5.7%   Prediabetes 5.7-6.4%    Diabetes 6.5% or higher     Note: Adopted from ADA consensus guidelines.   12/02/2021 5.6 0.0 - 5.6 % Final     Comment:     Normal <5.7%   Prediabetes 5.7-6.4%    Diabetes 6.5% or higher     Note: Adopted from ADA consensus guidelines.   07/05/2021 5.3 0 - 5.6 % Final     Comment:     Normal <5.7% Prediabetes 5.7-6.4%  Diabetes 6.5% or higher - adopted from ADA   consensus guidelines.     03/31/2021 5.7 (H) 0 - 5.6 % Final     Comment:     Normal <5.7% Prediabetes 5.7-6.4%  Diabetes 6.5% or higher - adopted from ADA   consensus guidelines.     11/16/2020 5.6 0 - 5.6 % Final     Comment:     Normal <5.7% Prediabetes 5.7-6.4%  Diabetes 6.5% or higher - adopted from ADA   consensus guidelines.     08/13/2020  6.6 (H) 0 - 5.6 % Final     Comment:     Normal <5.7% Prediabetes 5.7-6.4%  Diabetes 6.5% or higher - adopted from ADA   consensus guidelines.     10/03/2018 6.5 (H) 0 - 5.6 % Final     Comment:     Normal <5.7% Prediabetes 5.7-6.4%  Diabetes 6.5% or higher - adopted from ADA   consensus guidelines.          Past Medical/Surgical History:  Past Medical History:   Diagnosis Date     Chronic kidney disease      Diabetes (H)     prediabetic     No past surgical history on file.    Allergies:  Allergies   Allergen Reactions     Nickel Dermatitis, Rash and Swelling       Current Medications   Current Outpatient Medications   Medication Sig Dispense Refill     acetaminophen (TYLENOL) 325 MG tablet Please give to pt now in clinic ( 650 mg po) thanks ! 2 tablet 0     albuterol (PROAIR HFA/PROVENTIL HFA/VENTOLIN HFA) 108 (90 Base) MCG/ACT inhaler Inhale 2 puffs into the lungs every 4 hours as needed for shortness of breath, wheezing or cough Use with spacer 18 g 0     amoxicillin-clavulanate (AUGMENTIN) 875-125 MG tablet Take 1 tablet by mouth 2 times daily 20 tablet 0     azithromycin (ZITHROMAX) 250 MG tablet Two tablets first day, then one tablet daily for four days. 6 tablet 0     azithromycin (ZITHROMAX) 250 MG tablet 2 tablets the first day, then 1 tablet daily for the next 4 days 6 tablet 0     blood glucose (NO BRAND SPECIFIED) lancets standard Use to test blood sugar 2 times daily or as directed. 100 each 1     blood glucose monitoring (NO BRAND SPECIFIED) meter device kit Use to test blood sugar 2 times daily or as directed. 1 kit 0     Docosahexaenoic Acid (PRENATAL DHA) 200 MG capsule        hydrOXYzine (ATARAX) 25 MG tablet Take 25 mg by mouth every 4 hours as needed        medroxyPROGESTERone (PROVERA) 10 MG tablet Take 1 tab daily up to 10 days to trigger a menses each month prn if your UPT is negative 30 tablet 0     metFORMIN (GLUCOPHAGE) 500 MG tablet Take 1 tablet (500 mg) by mouth 2 times daily (with  meals) 180 tablet 3     naproxen (NAPROSYN DR) 500 MG EC tablet Take 500 mg by mouth 2 times daily as needed (pain) 60 tablet 0     ONETOUCH ULTRA test strip USE TO TEST BLOOD SUGARS TWICE DAILY OR AS DIRECTED 100 strip 11     Probiotic Product (PROBIOTIC DAILY PO)        Semaglutide, 1 MG/DOSE, (OZEMPIC) 4 MG/3ML pen Inject 1 mg Subcutaneous every 7 days 3 mL 11     No current facility-administered medications for this visit.       Family History:  Family History   Problem Relation Age of Onset     Diabetes Mother      Hypertension Mother      Glaucoma Mother      Heart Disease Father      Other Cancer Maternal Grandmother         ovarian     Cancer Maternal Grandmother      Glaucoma Maternal Grandmother      Macular Degeneration Maternal Grandmother      Diabetes Maternal Grandfather      Diabetes Paternal Grandmother      Hypertension Paternal Grandmother      Diabetes Brother      Glaucoma Brother        Social History:  Social History     Tobacco Use     Smoking status: Never     Passive exposure: Past     Smokeless tobacco: Never     Tobacco comments:     Dad smokes outside   Substance Use Topics     Alcohol use: Yes     Comment: occ   works full time assisted living.     ROS:  Full review of systems taken with the help of the intake sheet. Otherwise a complete 14 point review of systems was taken and is negative unless stated in the history above.      Physical Exam:   Vitals: There were no vitals taken for this visit.  BMI= There is no height or weight on file to calculate BMI.   General: well appearing, no acute distress, pleasant and conversant,   Mental Status/neuro: alert and oriented  Face: symmetrical, normal facial color  Eyes: anicteric, no proptosis or lid lag  Resp: no acute ditress      Labs : I reviewed data from epic and extract and summarize the pertinent data here.   Lab Results   Component Value Date     10/03/2018      Lab Results   Component Value Date    POTASSIUM 4.1 10/03/2018      Lab Results   Component Value Date    CHLORIDE 110 10/03/2018     Lab Results   Component Value Date    SEGUN 8.8 10/03/2018     Lab Results   Component Value Date    CO2 25 10/03/2018     Lab Results   Component Value Date    BUN 13 10/03/2018     Lab Results   Component Value Date    CR 0.55 10/03/2018     Lab Results   Component Value Date     08/13/2020     Lab Results   Component Value Date    TSH 1.09 08/13/2020     No results found for: T4  Lab Results   Component Value Date    A1C 6.6 08/13/2020       No results found for: IGF1  No results found for: LH  No results found for: FSH  No results found for: ESTROGEN  No results found for: PROLACTIN              Again, thank you for allowing me to participate in the care of your patient.        Sincerely,        Vy Rajan MD

## 2024-09-09 ENCOUNTER — TELEPHONE (OUTPATIENT)
Dept: ENDOCRINOLOGY | Facility: CLINIC | Age: 24
End: 2024-09-09
Payer: COMMERCIAL

## 2024-09-09 NOTE — TELEPHONE ENCOUNTER
Left Voicemail (1st Attempt) for the patient to call back and schedule the following:    Appointment type: return  Provider: dr. beck  Return date: 9/6/2025  Specialty phone number: [phone]  Additonal Notes: Return in about 1 year (around 9/6/2025).     [facility]   Orthopedics, Podiatry, Sports Medicine, Ent ,Eye , Audiology, Adult Endocrine & Diabetes, Nutrition & Medication Therapy Management Specialties   [facility]

## 2024-09-21 DIAGNOSIS — N91.2 AMENORRHEA: ICD-10-CM

## 2024-09-23 RX ORDER — MEDROXYPROGESTERONE ACETATE 10 MG
TABLET ORAL
Qty: 30 TABLET | Refills: 0 | Status: SHIPPED | OUTPATIENT
Start: 2024-09-23

## 2024-09-23 NOTE — TELEPHONE ENCOUNTER
Requested Prescriptions   Pending Prescriptions Disp Refills    medroxyPROGESTERone (PROVERA) 10 MG tablet [Pharmacy Med Name: MEDROXYPROGESTERONE 10MG TABLETS] 30 tablet 0     Sig: TAKE 1 TABLET BY MOUTH EVERY DAY UP TO 10 DAYS TO TRIGGER A MENSES EACH MONTH AS NEEDED IF YOUR UPT IS NEGATIVE       There is no refill protocol information for this order        Last seen: 6/08/2023 for amenorrhea    Last refilled: 6/25/2024 for 30 tabs & 0 refills    Patient had scheduled appointment prior to last 30 day greg refill.  Patient scheduled appointment, med was refilled then, patient canceled her appointment.      Patient has scheduled appointment with provider on 10/28/2024.    Routing to provider for eval/auth.      Verenice ZAMAN RN -  OB/GYN group

## 2024-10-20 ENCOUNTER — HEALTH MAINTENANCE LETTER (OUTPATIENT)
Age: 24
End: 2024-10-20

## 2024-10-28 ENCOUNTER — LAB (OUTPATIENT)
Dept: LAB | Facility: CLINIC | Age: 24
End: 2024-10-28
Payer: COMMERCIAL

## 2024-10-28 ENCOUNTER — OFFICE VISIT (OUTPATIENT)
Dept: OBGYN | Facility: CLINIC | Age: 24
End: 2024-10-28
Payer: COMMERCIAL

## 2024-10-28 DIAGNOSIS — Z00.00 ANNUAL PHYSICAL EXAM: Primary | ICD-10-CM

## 2024-10-28 DIAGNOSIS — E11.9 TYPE 2 DIABETES MELLITUS WITHOUT COMPLICATION, WITHOUT LONG-TERM CURRENT USE OF INSULIN (H): ICD-10-CM

## 2024-10-28 DIAGNOSIS — N92.6 IRREGULAR MENSES: ICD-10-CM

## 2024-10-28 LAB
EST. AVERAGE GLUCOSE BLD GHB EST-MCNC: 114 MG/DL
HBA1C MFR BLD: 5.6 % (ref 0–5.6)

## 2024-10-28 PROCEDURE — 99395 PREV VISIT EST AGE 18-39: CPT | Performed by: OBSTETRICS & GYNECOLOGY

## 2024-10-28 PROCEDURE — 83036 HEMOGLOBIN GLYCOSYLATED A1C: CPT

## 2024-10-28 PROCEDURE — 36415 COLL VENOUS BLD VENIPUNCTURE: CPT

## 2024-10-28 RX ORDER — MEDROXYPROGESTERONE ACETATE 10 MG
TABLET ORAL
Qty: 30 TABLET | Refills: 3 | Status: SHIPPED | OUTPATIENT
Start: 2024-10-28

## 2024-10-28 NOTE — PROGRESS NOTES
Bev is a 24 year old female, , who is here for her annual exam. She is not using contraception since she is hoping to conceive and is taking a PNV daily po.  However, her BP readings are elevated so I advised her to use condoms and avoid pregnancy at this time until her BP readings have been normalized.  She is to follow up with FP for this issue and admits that her mother and two brothers are all currently taking antihypertensive meds.  She has a hx of an irregular menses and has been taking Provera as needed to trigger a period.  I advised that she check a UPT at home prior to initiating Provera to avoid use in early pregnancy.  Her social hx is negative for nicotine, etoh, and illicit drug abuse.    ROS: Ten point review of systems was reviewed and negative except the above.    Health Maintenance   Topic Date Due    Pneumococcal Vaccine: Pediatrics (0 to 5 Years) and At-Risk Patients (6 to 64 Years) (2 of 2 - PCV) 2021    DIABETIC FOOT EXAM  2023    YEARLY PREVENTIVE VISIT  2023    INFLUENZA VACCINE (1) 2024    COVID-19 Vaccine (4 -  season) 2024    A1C  2025    BMP  2025    LIPID  2025    MICROALBUMIN  2025    EYE EXAM  2025    PAP  2025    ADVANCE CARE PLANNING  2027    DTAP/TDAP/TD IMMUNIZATION (8 - Td or Tdap) 2030    RSV VACCINE (1 - 1-dose 75+ series) 2073    HEPATITIS C SCREENING  Completed    HIV SCREENING  Completed    PHQ-2 (once per calendar year)  Completed    HPV IMMUNIZATION  Completed    HEPATITIS B IMMUNIZATION  Completed    MENINGITIS IMMUNIZATION  Aged Out    RSV MONOCLONAL ANTIBODY  Aged Out    CHLAMYDIA SCREENING  Discontinued      Last pap: due in 2025  Last Mammogram: not due  Last Dexa: not due  Last Colonoscopy: not due  Lab Results   Component Value Date    CHOL 166 2024    CHOL 196 2020     Lab Results   Component Value Date    HDL 45 2024    HDL 40 2020  "    Lab Results   Component Value Date    LDL 95 01/30/2024     08/13/2020     Lab Results   Component Value Date    TRIG 132 01/30/2024    TRIG 250 08/13/2020     No results found for: \"CHOLHDLRATIO\"    OBHX:      PSH: No past surgical history on file.    PMH: Her past medical, surgical, and obstetric histories were reviewed and are documented in their appropriate chart areas.    ALL/Meds: Her medication and allergy histories were reviewed and are documented in their appropriate chart areas.    SH/FMH: Her social and family history was reviewed and documented in its appropriate chart area.    PE: Breastfeeding No   There is no height or weight on file to calculate BMI.    General Appearance:  healthy, alert, active, no distress  Cardiovascular:  Regular rate and Rhythm without murmur  Neck: Supple, no adenopathy, and thyroid normal  Lungs:  Clear, without wheeze, rale or rhonchi  Breast: normal breast exam  Abdomen: Benign, Soft, non-tender, No masses, organomegaly, No inguinal nodes, and Bowel sounds normoactive.   Pelvic:       - Ext: Vulva and perineum are normal without lesion, mass or discharge        - Urethra: normal without discharge        - Urethral Meatus: normal appearance       - Bladder: no tenderness, no masses       - Vagina: Normal mucosa, no discharge         - Cervix: normal and nulliparous       - Uterus:Normal shape, position and consistency        - Adnexa: Normal without masses or tenderness       - Rectal: deferred    A/P:  Well Woman Exam, Pre-Conception Consult, Elevated BP Reading, Diabetes, Class 3 Obesity     -  I discussed the new pap recommendations regarding screening.  Explained the rationale for increased intervals between paps.  Questions asked and answered.  She does agree to this regiment.  Pap was not collected since next due in 8/2025   -  BC: None since the patient would like to conceive.  However, she was advised to use condoms and avoid pregnancy at this time until " her BP is normalized and she is in a healthier state.   -  Encouraged self-breast exam   -  Encouraged low fat diet, regular exercise, and adequate calcium intake.    Linnea Mars DO  FACOG, FACS

## 2024-10-28 NOTE — PATIENT INSTRUCTIONS
If you have labs or imaging done, the results will automatically release in [employer] without an interpretation.  Your health care professional will review those results and send an interpretation with recommendations as soon as possible, but this may be 1-3 business days.    If you have any questions regarding your visit, please contact your care team.     [employer]: [phone]  [facility] CLINIC HOURS TELEPHONE NUMBER   DO. Radha Angulo -Surgery Scheduler  Maryana -     DESTIN Rodriguez RN Kylie, RN [place]    Monday 8:30 am-5:00 pm  Wednesday 8:30 am-5:00 pm  Friday 8:30 am-5:00 pm    Typical Surgery day: Tuesday [facility]  [address] [place] [address]  Phone:  [phone]  Fax: [phone]   Appointment [phone]    Imaging Scheduling-All Locations [phone]    [facility]  [address] [place] [address] [place] [address] [phone]   **Surgeries** Our Surgery Schedulers will contact you to schedule. If you do not receive a call within 3 business days, please call [phone].  Urgent Care locations:  [facility] Monday-Friday   10 am - 8 pm  Saturday and    9 am - 5 pm [phone]   If you need a medication refill, please contact your pharmacy. Please allow 3 business days for your refill to be completed.  As always, Thank you for trusting us with your healthcare needs!  see additional instructions from your care team below

## 2024-11-19 ENCOUNTER — OFFICE VISIT (OUTPATIENT)
Dept: FAMILY MEDICINE | Facility: CLINIC | Age: 24
End: 2024-11-19
Payer: COMMERCIAL

## 2024-11-19 VITALS
DIASTOLIC BLOOD PRESSURE: 107 MMHG | OXYGEN SATURATION: 100 % | BODY MASS INDEX: 43.4 KG/M2 | WEIGHT: 293 LBS | SYSTOLIC BLOOD PRESSURE: 172 MMHG | TEMPERATURE: 97.4 F | RESPIRATION RATE: 16 BRPM | HEIGHT: 69 IN | HEART RATE: 76 BPM

## 2024-11-19 DIAGNOSIS — Z79.4 TYPE 2 DIABETES MELLITUS WITHOUT COMPLICATION, WITH LONG-TERM CURRENT USE OF INSULIN (H): ICD-10-CM

## 2024-11-19 DIAGNOSIS — I10 HYPERTENSION GOAL BP (BLOOD PRESSURE) < 140/90: Primary | ICD-10-CM

## 2024-11-19 DIAGNOSIS — E66.01 MORBID OBESITY WITH BMI OF 45.0-49.9, ADULT (H): ICD-10-CM

## 2024-11-19 DIAGNOSIS — E11.9 TYPE 2 DIABETES MELLITUS WITHOUT COMPLICATION, WITH LONG-TERM CURRENT USE OF INSULIN (H): ICD-10-CM

## 2024-11-19 PROBLEM — R73.01 ELEVATED FASTING BLOOD SUGAR: Status: RESOLVED | Noted: 2018-10-03 | Resolved: 2024-11-19

## 2024-11-19 PROBLEM — R03.0 ELEVATED BP WITHOUT DIAGNOSIS OF HYPERTENSION: Status: RESOLVED | Noted: 2018-10-03 | Resolved: 2024-11-19

## 2024-11-19 PROBLEM — R50.9 FEVER: Status: RESOLVED | Noted: 2024-05-02 | Resolved: 2024-11-19

## 2024-11-19 RX ORDER — LABETALOL 100 MG/1
100 TABLET, FILM COATED ORAL 2 TIMES DAILY
Qty: 60 TABLET | Refills: 0 | Status: SHIPPED | OUTPATIENT
Start: 2024-11-19

## 2024-11-19 ASSESSMENT — PAIN SCALES - GENERAL: PAINLEVEL_OUTOF10: NO PAIN (0)

## 2024-11-19 NOTE — PROGRESS NOTES
"  Assessment & Plan     Hypertension goal BP (blood pressure) < 140/90  Home blood pressure readings tend to run a bit more borderline than what we see in the clinic.  She keeps fairly good readings and they are typically 130s to low 140s systolic.  So I certainly would call this at least stage I hypertension.  And she is already working on weight loss and is on Ozempic for diabetes and obesity.  What comes in to play his plans for conception and she met with Dr. Soto recently who insisted that she get her blood pressure under control before conceiving.  So we talked about blood pressure risks during and after pregnancy and I do think we need to get her on an agent to prevent the development of preeclampsia, etc. this may be something that would resolve postpartum with further weight loss, etc. but for the safety of her and the baby is to be closely monitored.  So we will start on labetalol and follow-up in 2 to 3 weeks.  I see some urine microalbumin's that are mildly elevated but her creatinine and GFR are 100% perfectly normal.  So I would not call this chronic kidney disease at this point.  I do think things need to be monitored carefully.  - labetalol (NORMODYNE) 100 MG tablet; Take 1 tablet (100 mg) by mouth 2 times daily.    Morbid obesity with BMI of 45.0-49.9, adult (H)  As above    Type 2 diabetes mellitus without complication, with long-term current use of insulin (H)  As above          BMI  Estimated body mass index is 44.99 kg/m  as calculated from the following:    Height as of this encounter: 1.759 m (5' 9.25\").    Weight as of this encounter: 139.2 kg (306 lb 14.4 oz).         See Patient Instructions    Subjective   Bev is a 24 year old, presenting for the following health issues:  Hypertension        11/19/2024     8:26 AM   Additional Questions   Roomed by Alexis   Accompanied by Self         11/19/2024     8:26 AM   Patient Reported Additional Medications   Patient reports taking the " "following new medications None     History of Present Illness       Reason for visit:  Blood pressure   She is taking medications regularly.           Here today to talk about blood pressure is about      Review of Systems  Constitutional, HEENT, cardiovascular, pulmonary, gi and gu systems are negative, except as otherwise noted.      Objective    BP (!) 172/107 (BP Location: Left arm, Patient Position: Sitting, Cuff Size: Adult Large)   Pulse 76   Temp 97.4  F (36.3  C) (Tympanic)   Resp 16   Ht 1.759 m (5' 9.25\")   Wt 139.2 kg (306 lb 14.4 oz)   SpO2 100%   BMI 44.99 kg/m    Body mass index is 44.99 kg/m .  Physical Exam   Alert, pleasant, upbeat, and in no apparent discomfort.  S1 and S2 normal, no murmurs, clicks, gallops or rubs. Regular rate and rhythm. Chest is clear; no wheezes or rales. No edema or JVD.  Past labs reviewed with the patient.             Signed Electronically by: Yoly Tse MD    "

## 2024-12-04 ENCOUNTER — OFFICE VISIT (OUTPATIENT)
Dept: FAMILY MEDICINE | Facility: CLINIC | Age: 24
End: 2024-12-04
Payer: COMMERCIAL

## 2024-12-04 VITALS
BODY MASS INDEX: 44.41 KG/M2 | RESPIRATION RATE: 16 BRPM | WEIGHT: 293 LBS | SYSTOLIC BLOOD PRESSURE: 124 MMHG | HEART RATE: 74 BPM | TEMPERATURE: 98.6 F | HEIGHT: 68 IN | OXYGEN SATURATION: 96 % | DIASTOLIC BLOOD PRESSURE: 87 MMHG

## 2024-12-04 DIAGNOSIS — Z23 NEED FOR VACCINATION: ICD-10-CM

## 2024-12-04 DIAGNOSIS — Z23 HIGH PRIORITY FOR 2019-NCOV VACCINE: ICD-10-CM

## 2024-12-04 DIAGNOSIS — Z23 NEED FOR PROPHYLACTIC VACCINATION AND INOCULATION AGAINST INFLUENZA: ICD-10-CM

## 2024-12-04 DIAGNOSIS — I10 HYPERTENSION GOAL BP (BLOOD PRESSURE) < 140/90: Primary | ICD-10-CM

## 2024-12-04 PROCEDURE — 99213 OFFICE O/P EST LOW 20 MIN: CPT | Mod: 25 | Performed by: FAMILY MEDICINE

## 2024-12-04 PROCEDURE — 90677 PCV20 VACCINE IM: CPT | Performed by: FAMILY MEDICINE

## 2024-12-04 PROCEDURE — 90656 IIV3 VACC NO PRSV 0.5 ML IM: CPT | Performed by: FAMILY MEDICINE

## 2024-12-04 PROCEDURE — 90472 IMMUNIZATION ADMIN EACH ADD: CPT | Performed by: FAMILY MEDICINE

## 2024-12-04 PROCEDURE — 90480 ADMN SARSCOV2 VAC 1/ONLY CMP: CPT | Performed by: FAMILY MEDICINE

## 2024-12-04 PROCEDURE — 90471 IMMUNIZATION ADMIN: CPT | Performed by: FAMILY MEDICINE

## 2024-12-04 PROCEDURE — 91320 SARSCV2 VAC 30MCG TRS-SUC IM: CPT | Performed by: FAMILY MEDICINE

## 2024-12-04 RX ORDER — LABETALOL 200 MG/1
200 TABLET, FILM COATED ORAL 2 TIMES DAILY
Qty: 180 TABLET | Refills: 0 | Status: SHIPPED | OUTPATIENT
Start: 2024-12-04

## 2024-12-04 ASSESSMENT — PAIN SCALES - GENERAL
PAINLEVEL_OUTOF10: NO PAIN (0)
PAINLEVEL_OUTOF10: NO PAIN (0)

## 2024-12-04 NOTE — PROGRESS NOTES
"  Assessment & Plan     Hypertension goal BP (blood pressure) < 140/90  Started labetalol a few weeks ago.  Home blood pressure readings consistent with what we see here.  Definitely down but still little borderline.  No side effects.  We we will increase the dosage to 100 mg twice daily and I am guessing that she keep things in good check.  - labetalol (NORMODYNE) 200 MG tablet; Take 1 tablet (200 mg) by mouth 2 times daily.    Need for vaccination      Need for prophylactic vaccination and inoculation against influenza      High priority for 2019-nCoV vaccine            BMI  Estimated body mass index is 46.28 kg/m  as calculated from the following:    Height as of this encounter: 1.727 m (5' 8\").    Weight as of this encounter: 138.1 kg (304 lb 6 oz).   Weight management plan: Working on weight loss already      Regular exercise  See Patient Instructions    Tyler Pabol is a 24 year old, presenting for the following health issues:  Hypertension, Imm/Inj (Fluzone and Covid vaccine ), Imm/Inj (Flu Shot), and Imm/Inj (COVID-19 VACCINE)        12/4/2024     8:30 AM   Additional Questions   Roomed by Kristyn ZAMAN   Accompanied by self     History of Present Illness       Hypertension: She presents for follow up of hypertension.  She does check blood pressure  regularly outside of the clinic. Outside blood pressures have been over 140/90. She follows a low salt diet.     She eats 0-1 servings of fruits and vegetables daily.She consumes 0 sweetened beverage(s) daily.She exercises with enough effort to increase her heart rate 10 to 19 minutes per day.  She exercises with enough effort to increase her heart rate 3 or less days per week. She is missing 7 dose(s) of medications per week.  She is not taking prescribed medications regularly due to remembering to take.           Here today in follow-up of hypertension  Doing well.  Reports no interval health concerns.        Review of Systems  Constitutional, HEENT, " "cardiovascular, pulmonary, gi and gu systems are negative, except as otherwise noted.      Objective    /87 (BP Location: Right arm, Patient Position: Sitting, Cuff Size: Adult Large)   Pulse 74   Temp 98.6  F (37  C) (Oral)   Resp 16   Ht 1.727 m (5' 8\")   Wt 138.1 kg (304 lb 6 oz)   LMP 12/04/2024   SpO2 96%   BMI 46.28 kg/m    Body mass index is 46.28 kg/m .  Physical Exam   Alert, pleasant, upbeat, and in no apparent discomfort.  S1 and S2 normal, no murmurs, clicks, gallops or rubs. Regular rate and rhythm. Chest is clear; no wheezes or rales. No edema or JVD.  Past labs reviewed with the patient.             Signed Electronically by: Yoly Tse MD    "

## 2024-12-04 NOTE — NURSING NOTE
Prior to immunization administration, verified patients identity using patient s name and date of birth. Please see Immunization Activity for additional information.     Screening Questionnaire for Adult Immunization    Are you sick today?   No   Do you have allergies to medications, food, a vaccine component or latex?   No   Have you ever had a serious reaction after receiving a vaccination?   No   Do you have a long-term health problem with heart, lung, kidney, or metabolic disease (e.g., diabetes), asthma, a blood disorder, no spleen, complement component deficiency, a cochlear implant, or a spinal fluid leak?  Are you on long-term aspirin therapy?   No   Do you have cancer, leukemia, HIV/AIDS, or any other immune system problem?   No   Do you have a parent, brother, or sister with an immune system problem?   No   In the past 3 months, have you taken medications that affect  your immune system, such as prednisone, other steroids, or anticancer drugs; drugs for the treatment of rheumatoid arthritis, Crohn s disease, or psoriasis; or have you had radiation treatments?   No   Have you had a seizure, or a brain or other nervous system problem?   No   During the past year, have you received a transfusion of blood or blood    products, or been given immune (gamma) globulin or antiviral drug?   No   For women: Are you pregnant or is there a chance you could become       pregnant during the next month?   No   Have you received any vaccinations in the past 4 weeks?   No     Immunization questionnaire answers were all negative.      Patient instructed to remain in clinic for 15 minutes afterwards, and to report any adverse reactions.     Screening performed by Pia Kirby MA on 12/4/2024 at 8:53 AM.

## 2025-01-15 ENCOUNTER — MYC REFILL (OUTPATIENT)
Dept: ENDOCRINOLOGY | Facility: CLINIC | Age: 25
End: 2025-01-15
Payer: COMMERCIAL

## 2025-01-15 DIAGNOSIS — E11.9 TYPE 2 DIABETES MELLITUS WITHOUT COMPLICATION, UNSPECIFIED WHETHER LONG TERM INSULIN USE (H): ICD-10-CM

## 2025-01-16 NOTE — TELEPHONE ENCOUNTER
LVD:  9/6/2024  [facility] Vy Connell MD  Endocrinology, Diabetes, and Metabolism  Metformin 500 MG  Hemoglobin A1C   Date Value Ref Range Status   10/28/2024 5.6 0.0 - 5.6 % Final     Comment:     Normal <5.7%   Prediabetes 5.7-6.4%    Diabetes 6.5% or higher     Note: Adopted from ADA consensus guidelines.   07/05/2021 5.3 0 - 5.6 % Final     Comment:     Normal <5.7% Prediabetes 5.7-6.4%  Diabetes 6.5% or higher - adopted from ADA   consensus guidelines.        Refilled per protocol.